# Patient Record
Sex: FEMALE | Race: WHITE | NOT HISPANIC OR LATINO | Employment: OTHER | URBAN - METROPOLITAN AREA
[De-identification: names, ages, dates, MRNs, and addresses within clinical notes are randomized per-mention and may not be internally consistent; named-entity substitution may affect disease eponyms.]

---

## 2017-05-04 ENCOUNTER — ALLSCRIPTS OFFICE VISIT (OUTPATIENT)
Dept: OTHER | Facility: OTHER | Age: 82
End: 2017-05-04

## 2017-05-04 DIAGNOSIS — I10 ESSENTIAL (PRIMARY) HYPERTENSION: ICD-10-CM

## 2017-05-23 ENCOUNTER — LAB REQUISITION (OUTPATIENT)
Dept: LAB | Facility: HOSPITAL | Age: 82
End: 2017-05-23
Payer: MEDICARE

## 2017-05-23 ENCOUNTER — TRANSCRIBE ORDERS (OUTPATIENT)
Dept: ADMINISTRATIVE | Facility: HOSPITAL | Age: 82
End: 2017-05-23

## 2017-05-23 DIAGNOSIS — I10 ESSENTIAL (PRIMARY) HYPERTENSION: ICD-10-CM

## 2017-05-23 LAB
ALBUMIN SERPL BCP-MCNC: 3 G/DL (ref 3.5–5)
ALP SERPL-CCNC: 69 U/L (ref 46–116)
ALT SERPL W P-5'-P-CCNC: 16 U/L (ref 12–78)
ANION GAP SERPL CALCULATED.3IONS-SCNC: 0 MMOL/L (ref 4–13)
AST SERPL W P-5'-P-CCNC: 17 U/L (ref 5–45)
BASOPHILS # BLD AUTO: 0.03 THOUSANDS/ΜL (ref 0–0.1)
BASOPHILS NFR BLD AUTO: 1 % (ref 0–1)
BILIRUB SERPL-MCNC: 0.4 MG/DL (ref 0.2–1)
BUN SERPL-MCNC: 12 MG/DL (ref 5–25)
CALCIUM SERPL-MCNC: 9.8 MG/DL (ref 8.3–10.1)
CHLORIDE SERPL-SCNC: 100 MMOL/L (ref 100–108)
CO2 SERPL-SCNC: 36 MMOL/L (ref 21–32)
CREAT SERPL-MCNC: 0.72 MG/DL (ref 0.6–1.3)
EOSINOPHIL # BLD AUTO: 0.18 THOUSAND/ΜL (ref 0–0.61)
EOSINOPHIL NFR BLD AUTO: 3 % (ref 0–6)
ERYTHROCYTE [DISTWIDTH] IN BLOOD BY AUTOMATED COUNT: 11.9 % (ref 11.6–15.1)
GFR SERPL CREATININE-BSD FRML MDRD: >60 ML/MIN/1.73SQ M
GLUCOSE P FAST SERPL-MCNC: 99 MG/DL (ref 65–99)
HCT VFR BLD AUTO: 43.2 % (ref 34.8–46.1)
HGB BLD-MCNC: 14.9 G/DL (ref 11.5–15.4)
LYMPHOCYTES # BLD AUTO: 1.58 THOUSANDS/ΜL (ref 0.6–4.47)
LYMPHOCYTES NFR BLD AUTO: 29 % (ref 14–44)
MCH RBC QN AUTO: 33 PG (ref 26.8–34.3)
MCHC RBC AUTO-ENTMCNC: 34.5 G/DL (ref 31.4–37.4)
MCV RBC AUTO: 96 FL (ref 82–98)
MONOCYTES # BLD AUTO: 0.45 THOUSAND/ΜL (ref 0.17–1.22)
MONOCYTES NFR BLD AUTO: 8 % (ref 4–12)
NEUTROPHILS # BLD AUTO: 3.15 THOUSANDS/ΜL (ref 1.85–7.62)
NEUTS SEG NFR BLD AUTO: 59 % (ref 43–75)
PLATELET # BLD AUTO: 312 THOUSANDS/UL (ref 149–390)
PMV BLD AUTO: 9.7 FL (ref 8.9–12.7)
POTASSIUM SERPL-SCNC: 4.5 MMOL/L (ref 3.5–5.3)
PROT SERPL-MCNC: 6.9 G/DL (ref 6.4–8.2)
RBC # BLD AUTO: 4.52 MILLION/UL (ref 3.81–5.12)
SODIUM SERPL-SCNC: 136 MMOL/L (ref 136–145)
WBC # BLD AUTO: 5.39 THOUSAND/UL (ref 4.31–10.16)

## 2017-05-23 PROCEDURE — 80053 COMPREHEN METABOLIC PANEL: CPT | Performed by: INTERNAL MEDICINE

## 2017-05-23 PROCEDURE — 85025 COMPLETE CBC W/AUTO DIFF WBC: CPT | Performed by: INTERNAL MEDICINE

## 2017-05-24 ENCOUNTER — GENERIC CONVERSION - ENCOUNTER (OUTPATIENT)
Dept: OTHER | Facility: OTHER | Age: 82
End: 2017-05-24

## 2018-01-12 NOTE — MISCELLANEOUS
Message  having increased difficulty with ambulating more than 1/2 block due to leg weakness and fatigue   As per my conversation with her daughter   I feel a transport wheelchair is needed to safely transport for appointments and needed shopping , banking needs etc   will complete form for [employer]   Electronically signed by : Yaima Tellez DO; Oct 10 2016  4:56PM EST                       (Author)

## 2018-01-13 NOTE — RESULT NOTES
Message   call [facility] ok     Verified Results  (1) CBC/ PLT (NO DIFF) 28Apr2016 05:10PM [facility] Order Number: [id number]    TW Order Number: [id number]     Test Name Result Flag Reference   HEMATOCRIT 45 0 %  34 8-46 1   HEMOGLOBIN 15 1 g/dL  11 5-15 4   MCHC 33 6 g/dL  31 4-37 4   MCH 32 1 pg  26 8-34 3   MCV 96 fL  82-98   PLATELET COUNT 957 Thousands/uL  149-390   RBC COUNT 4 70 Million/uL  3 81-5 12   RDW 11 7 %  11 6-15 1   WBC COUNT 6 36 Thousand/uL  4 31-10 16   MPV 9 4 fL  8 9-12 7     (1) COMPREHENSIVE METABOLIC PANEL [id number] 15:33TL Rajwinder [employer] Order Number: [id number]     Order Number: [id number] Order Number: [id number]  National Kidney Disease Education Program recommendations are as follows:  GFR calculation is accurate only with a steady state creatinine  Chronic Kidney disease less than 60 ml/min/1 73 sq  meters  Kidney failure less than 15 ml/min/1 73 sq  meters  Test Name Result Flag Reference   GLUCOSE,RANDM 127 mg/dL     If the patient is fasting, the ADA then defines impaired fasting glucose as > 100 mg/dL and diabetes as > or equal to 123 mg/dL     SODIUM 134 mmol/L L 136-145   POTASSIUM 4 3 mmol/L  3 5-5 3   CHLORIDE 97 mmol/L L 100-108   CARBON DIOXIDE 32 mmol/L  21-32   ANION GAP (CALC) 5 mmol/L  4-13   BLOOD UREA NITROGEN 14 mg/dL  5-25   CREATININE 0 90 mg/dL  0 60-1 30   Standardized to IDMS reference method   CALCIUM 10 0 mg/dL  8 3-10 1   BILI, TOTAL 0 50 mg/dL  0 20-1 00   ALK PHOSPHATAS 73 U/L     ALT (SGPT) 16 U/L  12-78   AST(SGOT) 16 U/L  5-45   ALBUMIN 3 3 g/dL L 3 5-5 0   TOTAL PROTEIN 7 0 g/dL  6 4-8 2   eGFR Non-African American 58 3 ml/min/1 73sq m       (1) TSH 28Apr2016 05:10PM [employer] Order Number: [id number]     Order Number: [id number] Order Number: [id number]        The recommended reference ranges for TSH during pregnancy are as follows:  First trimester 0 1 to 2 5 uIU/mL  Second trimester  0 2 to 3 0 uIU/mL  Third trimester 0 3 to 3 0 uIU/m     Test Name Result Flag Reference   TSH 2 367 uIU/mL  0 358-3 740

## 2018-01-14 VITALS — DIASTOLIC BLOOD PRESSURE: 78 MMHG | SYSTOLIC BLOOD PRESSURE: 130 MMHG

## 2018-01-16 NOTE — RESULT NOTES
Verified Results  (1) CBC/PLT/DIFF [id number] 06:00PM Herbert Bombard     Test Name Result Flag Reference   WBC COUNT 5 39 Thousand/uL  4 31-10 16   RBC COUNT 4 52 Million/uL  3 81-5 12   HEMOGLOBIN 14 9 g/dL  11 5-15 4   HEMATOCRIT 43 2 %  34 8-46  1   MCV 96 fL  82-98   MCH 33 0 pg  26 8-34 3   MCHC 34 5 g/dL  31 4-37 4   RDW 11 9 %  11 6-15 1   MPV 9 7 fL  8 9-12 7   PLATELET COUNT 965 Thousands/uL  149-390   NEUTROPHILS RELATIVE PERCENT 59 %  43-75   LYMPHOCYTES RELATIVE PERCENT 29 %  14-44   MONOCYTES RELATIVE PERCENT 8 %  4-12   EOSINOPHILS RELATIVE PERCENT 3 %  0-6   BASOPHILS RELATIVE PERCENT 1 %  0-1   NEUTROPHILS ABSOLUTE COUNT 3 15 Thousands/? ??L  1 85-7 62   LYMPHOCYTES ABSOLUTE COUNT 1 58 Thousands/? ??L  0 60-4 47   MONOCYTES ABSOLUTE COUNT 0 45 Thousand/? ??L  0 17-1 22   EOSINOPHILS ABSOLUTE COUNT 0 18 Thousand/? ??L  0 00-0 61   BASOPHILS ABSOLUTE COUNT 0 03 Thousands/? ??L  0 00-0 10   This bloodwork is non-fasting  Please drink two glasses of water morning of  bloodwork  (1) COMPREHENSIVE METABOLIC PANEL [id number] 27:41NR Herbert Bombard     Test Name Result Flag Reference   SODIUM 136 mmol/L  136-145   POTASSIUM 4 5 mmol/L  3 5-5 3   CHLORIDE 100 mmol/L  100-108   CARBON DIOXIDE 36 mmol/L H 21-32   ANION GAP (CALC) 0 mmol/L L 4-13   BLOOD UREA NITROGEN 12 mg/dL  5-25   CREATININE 0 72 mg/dL  0 60-1 30   Standardized to IDMS reference method   CALCIUM 9 8 mg/dL  8 3-10 1   BILI, TOTAL 0 40 mg/dL  0 20-1 00   ALK PHOSPHATAS 69 U/L     ALT (SGPT) 16 U/L  12-78   AST(SGOT) 17 U/L  5-45   ALBUMIN 3 0 g/dL L 3 5-5 0   TOTAL PROTEIN 6 9 g/dL  6 4-8 2   eGFR Non-African American      >60 0 ml/min/1 73sq m   [facility] Disease Education Program recommendations are as follows:  GFR calculation is accurate only with a steady state creatinine  Chronic Kidney disease less than 60 ml/min/1 73 sq  meters  Kidney failure less than 15 ml/min/1 73 sq  meters     GLUCOSE FASTING 99 mg/dL  65-99

## 2018-02-18 DIAGNOSIS — K21.9 GASTROESOPHAGEAL REFLUX DISEASE WITHOUT ESOPHAGITIS: Primary | ICD-10-CM

## 2018-02-18 RX ORDER — OMEPRAZOLE 20 MG/1
CAPSULE, DELAYED RELEASE ORAL
Qty: 90 CAPSULE | Refills: 3 | Status: SHIPPED | OUTPATIENT
Start: 2018-02-18 | End: 2019-02-13 | Stop reason: SDUPTHER

## 2018-03-12 RX ORDER — PAROXETINE 30 MG/1
TABLET, FILM COATED ORAL
Qty: 90 TABLET | Refills: 2 | OUTPATIENT
Start: 2018-03-12

## 2018-04-23 RX ORDER — PAROXETINE 30 MG/1
TABLET, FILM COATED ORAL DAILY
COMMUNITY
Start: 2015-01-21 | End: 2018-05-01 | Stop reason: SDUPTHER

## 2018-04-23 RX ORDER — ALBUTEROL SULFATE 90 UG/1
2 AEROSOL, METERED RESPIRATORY (INHALATION)
COMMUNITY
Start: 2015-09-08 | End: 2019-02-13 | Stop reason: SDUPTHER

## 2018-04-23 RX ORDER — METOPROLOL SUCCINATE 50 MG
TABLET, EXTENDED RELEASE 24 HR ORAL
COMMUNITY
Start: 2018-03-02 | End: 2018-11-27 | Stop reason: SDUPTHER

## 2018-04-23 RX ORDER — NYSTATIN 100000 U/G
CREAM TOPICAL 2 TIMES DAILY
COMMUNITY
Start: 2017-05-19

## 2018-04-23 RX ORDER — BRINZOLAMIDE 10 MG/ML
SUSPENSION/ DROPS OPHTHALMIC
COMMUNITY

## 2018-04-23 RX ORDER — LOSARTAN POTASSIUM 100 MG/1
TABLET ORAL
COMMUNITY
Start: 2018-02-18 | End: 2018-11-15 | Stop reason: SDUPTHER

## 2018-05-01 ENCOUNTER — TELEPHONE (OUTPATIENT)
Dept: FAMILY MEDICINE CLINIC | Facility: HOSPITAL | Age: 83
End: 2018-05-01

## 2018-05-01 DIAGNOSIS — F34.1 DYSTHYMIA: Primary | ICD-10-CM

## 2018-05-01 RX ORDER — PAROXETINE 30 MG/1
30 TABLET, FILM COATED ORAL EVERY MORNING
Qty: 90 TABLET | Refills: 3 | Status: SHIPPED | OUTPATIENT
Start: 2018-05-01 | End: 2018-10-11 | Stop reason: SDUPTHER

## 2018-05-03 ENCOUNTER — TRANSCRIBE ORDERS (OUTPATIENT)
Dept: ADMINISTRATIVE | Facility: HOSPITAL | Age: 83
End: 2018-05-03

## 2018-05-03 ENCOUNTER — OFFICE VISIT (OUTPATIENT)
Dept: FAMILY MEDICINE CLINIC | Facility: HOSPITAL | Age: 83
End: 2018-05-03
Payer: MEDICARE

## 2018-05-03 ENCOUNTER — APPOINTMENT (OUTPATIENT)
Dept: LAB | Facility: HOSPITAL | Age: 83
End: 2018-05-03
Payer: MEDICARE

## 2018-05-03 VITALS
HEIGHT: 64 IN | WEIGHT: 115 LBS | SYSTOLIC BLOOD PRESSURE: 130 MMHG | BODY MASS INDEX: 19.63 KG/M2 | DIASTOLIC BLOOD PRESSURE: 70 MMHG

## 2018-05-03 DIAGNOSIS — J45.20 MILD INTERMITTENT ASTHMA WITHOUT COMPLICATION: ICD-10-CM

## 2018-05-03 DIAGNOSIS — K21.9 GASTROESOPHAGEAL REFLUX DISEASE WITHOUT ESOPHAGITIS: ICD-10-CM

## 2018-05-03 DIAGNOSIS — I10 ESSENTIAL HYPERTENSION: ICD-10-CM

## 2018-05-03 DIAGNOSIS — I10 ESSENTIAL HYPERTENSION: Primary | ICD-10-CM

## 2018-05-03 DIAGNOSIS — F32.A MINOR DEPRESSION: ICD-10-CM

## 2018-05-03 LAB
ALBUMIN SERPL BCP-MCNC: 3.2 G/DL (ref 3.5–5)
ALP SERPL-CCNC: 66 U/L (ref 46–116)
ALT SERPL W P-5'-P-CCNC: 16 U/L (ref 12–78)
ANION GAP SERPL CALCULATED.3IONS-SCNC: 4 MMOL/L (ref 4–13)
AST SERPL W P-5'-P-CCNC: 14 U/L (ref 5–45)
BILIRUB SERPL-MCNC: 0.6 MG/DL (ref 0.2–1)
BUN SERPL-MCNC: 10 MG/DL (ref 5–25)
CALCIUM SERPL-MCNC: 9.7 MG/DL (ref 8.3–10.1)
CHLORIDE SERPL-SCNC: 99 MMOL/L (ref 100–108)
CO2 SERPL-SCNC: 33 MMOL/L (ref 21–32)
CREAT SERPL-MCNC: 0.62 MG/DL (ref 0.6–1.3)
ERYTHROCYTE [DISTWIDTH] IN BLOOD BY AUTOMATED COUNT: 11.9 % (ref 11.6–15.1)
GFR SERPL CREATININE-BSD FRML MDRD: 76 ML/MIN/1.73SQ M
GLUCOSE SERPL-MCNC: 107 MG/DL (ref 65–140)
HCT VFR BLD AUTO: 44.3 % (ref 34.8–46.1)
HGB BLD-MCNC: 14.6 G/DL (ref 11.5–15.4)
MCH RBC QN AUTO: 32.3 PG (ref 26.8–34.3)
MCHC RBC AUTO-ENTMCNC: 33 G/DL (ref 31.4–37.4)
MCV RBC AUTO: 98 FL (ref 82–98)
PLATELET # BLD AUTO: 307 THOUSANDS/UL (ref 149–390)
PMV BLD AUTO: 9.1 FL (ref 8.9–12.7)
POTASSIUM SERPL-SCNC: 4.3 MMOL/L (ref 3.5–5.3)
PROT SERPL-MCNC: 6.7 G/DL (ref 6.4–8.2)
RBC # BLD AUTO: 4.52 MILLION/UL (ref 3.81–5.12)
SODIUM SERPL-SCNC: 136 MMOL/L (ref 136–145)
WBC # BLD AUTO: 6.48 THOUSAND/UL (ref 4.31–10.16)

## 2018-05-03 PROCEDURE — 36415 COLL VENOUS BLD VENIPUNCTURE: CPT | Performed by: INTERNAL MEDICINE

## 2018-05-03 PROCEDURE — 80053 COMPREHEN METABOLIC PANEL: CPT | Performed by: INTERNAL MEDICINE

## 2018-05-03 PROCEDURE — 99213 OFFICE O/P EST LOW 20 MIN: CPT | Performed by: INTERNAL MEDICINE

## 2018-05-03 PROCEDURE — 85027 COMPLETE CBC AUTOMATED: CPT | Performed by: INTERNAL MEDICINE

## 2018-05-03 NOTE — PROGRESS NOTES
Assessment/Plan:       Diagnoses and all orders for this visit:    Essential hypertension    Mild intermittent asthma without complication    Minor depression    Gastroesophageal reflux disease without esophagitis          All of the above diagnoses have been assessed  Additional COMMENTS/PLAN: Doing well for her age      Subjective:      Patient ID: Jelani Palomino is a 80 y o  female  HPI    The following portions of the patient's history were revised and updated as appropriate: Problem list, allergies, med list, FH, SH, Past medical and surgical histories  Current Outpatient Prescriptions   Medication Sig Dispense Refill    albuterol (PROAIR HFA) 90 mcg/act inhaler Inhale 2 puffs      brinzolamide (AZOPT) 1 % ophthalmic suspension Apply to eye      losartan (COZAAR) 100 MG tablet       nystatin (MYCOSTATIN) cream Apply topically 2 (two) times a day      omeprazole (PriLOSEC) 20 mg delayed release capsule TAKE 1 CAPSULE DAILY 90 capsule 3    PARoxetine (PAXIL) 30 mg tablet Take 1 tablet (30 mg total) by mouth every morning 90 tablet 3    TOPROL XL 50 MG 24 hr tablet        No current facility-administered medications for this visit  Review of Systems   Constitutional: Negative  Has lost 19 lbs in the last year  Eyes: Positive for visual disturbance  Respiratory: Negative  Cardiovascular: Negative  Gastrointestinal: Negative  Genitourinary: Negative  Objective:    /80 (BP Location: Left arm, Patient Position: Sitting, Cuff Size: Standard)   Ht 5' 4" (1 626 m)   Wt 52 2 kg (115 lb)   BMI 19 74 kg/m²      Physical Exam   Neck: No thyromegaly present  Cardiovascular: Normal rate and regular rhythm  Pulmonary/Chest: Effort normal and breath sounds normal    Abdominal: Soft  Bowel sounds are normal    Musculoskeletal: She exhibits no edema  Vitals reviewed  No visits with results within 2 Week(s) from this visit     Latest known visit with results is:   Lab Requisition on 05/23/2017   Component Date Value Ref Range Status    WBC 05/23/2017 5 39  4 31 - 10 16 Thousand/uL Final    RBC 05/23/2017 4 52  3 81 - 5 12 Million/uL Final    Hemoglobin 05/23/2017 14 9  11 5 - 15 4 g/dL Final    Hematocrit 05/23/2017 43 2  34 8 - 46 1 % Final    MCV 05/23/2017 96  82 - 98 fL Final    MCH 05/23/2017 33 0  26 8 - 34 3 pg Final    MCHC 05/23/2017 34 5  31 4 - 37 4 g/dL Final    RDW 05/23/2017 11 9  11 6 - 15 1 % Final    MPV 05/23/2017 9 7  8 9 - 12 7 fL Final    Platelets 95/19/1978 312  149 - 390 Thousands/uL Final    Neutrophils Relative 05/23/2017 59  43 - 75 % Final    Lymphocytes Relative 05/23/2017 29  14 - 44 % Final    Monocytes Relative 05/23/2017 8  4 - 12 % Final    Eosinophils Relative 05/23/2017 3  0 - 6 % Final    Basophils Relative 05/23/2017 1  0 - 1 % Final    Neutrophils Absolute 05/23/2017 3 15  1 85 - 7 62 Thousands/µL Final    Lymphocytes Absolute 05/23/2017 1 58  0 60 - 4 47 Thousands/µL Final    Monocytes Absolute 05/23/2017 0 45  0 17 - 1 22 Thousand/µL Final    Eosinophils Absolute 05/23/2017 0 18  0 00 - 0 61 Thousand/µL Final    Basophils Absolute 05/23/2017 0 03  0 00 - 0 10 Thousands/µL Final    Sodium 05/23/2017 136  136 - 145 mmol/L Final    Potassium 05/23/2017 4 5  3 5 - 5 3 mmol/L Final    Chloride 05/23/2017 100  100 - 108 mmol/L Final    CO2 05/23/2017 36* 21 - 32 mmol/L Final    Anion Gap 05/23/2017 0* 4 - 13 mmol/L Final    BUN 05/23/2017 12  5 - 25 mg/dL Final    Creatinine 05/23/2017 0 72  0 60 - 1 30 mg/dL Final    Standardized to IDMS reference method    Glucose, Fasting 05/23/2017 99  65 - 99 mg/dL Final    Calcium 05/23/2017 9 8  8 3 - 10 1 mg/dL Final    AST 05/23/2017 17  5 - 45 U/L Final    ALT 05/23/2017 16  12 - 78 U/L Final    Alkaline Phosphatase 05/23/2017 69  46 - 116 U/L Final    Total Protein 05/23/2017 6 9  6 4 - 8 2 g/dL Final    Albumin 05/23/2017 3 0* 3 5 - 5 0 g/dL Final  Total Bilirubin 05/23/2017 0 40  0 20 - 1 00 mg/dL Final    eGFR 05/23/2017 >60 0  ml/min/1 73sq m Final         Eunice Horner MD

## 2018-06-07 ENCOUNTER — TELEPHONE (OUTPATIENT)
Dept: FAMILY MEDICINE CLINIC | Facility: HOSPITAL | Age: 83
End: 2018-06-07

## 2018-06-14 ENCOUNTER — TELEPHONE (OUTPATIENT)
Dept: FAMILY MEDICINE CLINIC | Facility: HOSPITAL | Age: 83
End: 2018-06-14

## 2018-10-11 DIAGNOSIS — F34.1 DYSTHYMIA: ICD-10-CM

## 2018-10-11 RX ORDER — PAROXETINE 30 MG/1
30 TABLET, FILM COATED ORAL EVERY MORNING
Qty: 90 TABLET | Refills: 3 | Status: SHIPPED | OUTPATIENT
Start: 2018-10-11 | End: 2019-10-07 | Stop reason: SDUPTHER

## 2018-11-15 DIAGNOSIS — I10 ESSENTIAL HYPERTENSION: Primary | ICD-10-CM

## 2018-11-15 RX ORDER — LOSARTAN POTASSIUM 100 MG/1
TABLET ORAL
Qty: 90 TABLET | Refills: 3 | Status: SHIPPED | OUTPATIENT
Start: 2018-11-15 | End: 2019-11-10 | Stop reason: SDUPTHER

## 2018-11-27 DIAGNOSIS — I10 ESSENTIAL HYPERTENSION: Primary | ICD-10-CM

## 2018-11-28 RX ORDER — METOPROLOL SUCCINATE 50 MG/1
TABLET, EXTENDED RELEASE ORAL
Qty: 90 TABLET | Refills: 3 | Status: SHIPPED | OUTPATIENT
Start: 2018-11-28 | End: 2019-12-08 | Stop reason: SDUPTHER

## 2019-01-03 ENCOUNTER — TELEPHONE (OUTPATIENT)
Dept: FAMILY MEDICINE CLINIC | Facility: HOSPITAL | Age: 84
End: 2019-01-03

## 2019-01-03 NOTE — TELEPHONE ENCOUNTER
Spoke with Evelyne Ahmadi, she will call the pharmacy to see if the medication they were given was affected by the recall

## 2019-02-13 DIAGNOSIS — J45.20 MILD INTERMITTENT ASTHMA WITHOUT COMPLICATION: Primary | ICD-10-CM

## 2019-02-13 DIAGNOSIS — K21.9 GASTROESOPHAGEAL REFLUX DISEASE WITHOUT ESOPHAGITIS: ICD-10-CM

## 2019-02-13 RX ORDER — OMEPRAZOLE 20 MG/1
CAPSULE, DELAYED RELEASE ORAL
Qty: 90 CAPSULE | Refills: 3 | Status: SHIPPED | OUTPATIENT
Start: 2019-02-13 | End: 2022-06-11

## 2019-02-13 RX ORDER — ALBUTEROL SULFATE 90 UG/1
2 AEROSOL, METERED RESPIRATORY (INHALATION) EVERY 4 HOURS PRN
Qty: 1 INHALER | Refills: 3 | Status: SHIPPED | OUTPATIENT
Start: 2019-02-13

## 2019-09-24 ENCOUNTER — TELEPHONE (OUTPATIENT)
Dept: FAMILY MEDICINE CLINIC | Facility: HOSPITAL | Age: 84
End: 2019-09-24

## 2019-09-24 NOTE — TELEPHONE ENCOUNTER
On recall- dtr spoke [facility] and Losartan recall did not include her particular Lot  She wants your opinion on whether or not to switch, she is fine either way, Also: asking if you do house calls  PT has many bad days and it may be difficult to get her here for OV

## 2019-10-07 DIAGNOSIS — F34.1 DYSTHYMIA: ICD-10-CM

## 2019-10-07 RX ORDER — PAROXETINE 30 MG/1
TABLET, FILM COATED ORAL
Qty: 90 TABLET | Refills: 4 | Status: SHIPPED | OUTPATIENT
Start: 2019-10-07 | End: 2022-06-11

## 2019-11-10 DIAGNOSIS — I10 ESSENTIAL HYPERTENSION: ICD-10-CM

## 2019-11-10 RX ORDER — LOSARTAN POTASSIUM 100 MG/1
TABLET ORAL
Qty: 90 TABLET | Refills: 4 | Status: SHIPPED | OUTPATIENT
Start: 2019-11-10 | End: 2022-06-11

## 2019-11-12 ENCOUNTER — HOSPITAL ENCOUNTER (INPATIENT)
Facility: HOSPITAL | Age: 84
LOS: 2 days | Discharge: HOME HEALTH | DRG: 884 | End: 2019-11-15
Attending: EMERGENCY MEDICINE | Admitting: INTERNAL MEDICINE
Payer: MEDICARE

## 2019-11-12 ENCOUNTER — APPOINTMENT (EMERGENCY)
Dept: RADIOLOGY | Facility: HOSPITAL | Age: 84
DRG: 884 | End: 2019-11-12
Payer: MEDICARE

## 2019-11-12 DIAGNOSIS — R53.1 WEAKNESS: Primary | ICD-10-CM

## 2019-11-12 DIAGNOSIS — R09.02 HYPOXIA: ICD-10-CM

## 2019-11-12 DIAGNOSIS — R26.81 UNSTEADY GAIT: ICD-10-CM

## 2019-11-12 LAB
ALBUMIN SERPL BCP-MCNC: 2.6 G/DL (ref 3.5–5)
ALP SERPL-CCNC: 60 U/L (ref 46–116)
ALT SERPL W P-5'-P-CCNC: 17 U/L (ref 12–78)
ANION GAP SERPL CALCULATED.3IONS-SCNC: 1 MMOL/L (ref 4–13)
APTT PPP: 39 SECONDS (ref 23–37)
AST SERPL W P-5'-P-CCNC: 21 U/L (ref 5–45)
BACTERIA UR QL AUTO: ABNORMAL /HPF
BASOPHILS # BLD AUTO: 0.03 THOUSANDS/ΜL (ref 0–0.1)
BASOPHILS NFR BLD AUTO: 0 % (ref 0–1)
BILIRUB SERPL-MCNC: 0.3 MG/DL (ref 0.2–1)
BILIRUB UR QL STRIP: NEGATIVE
BUN SERPL-MCNC: 11 MG/DL (ref 5–25)
CALCIUM SERPL-MCNC: 8.7 MG/DL (ref 8.3–10.1)
CHLORIDE SERPL-SCNC: 101 MMOL/L (ref 100–108)
CLARITY UR: CLEAR
CO2 SERPL-SCNC: 31 MMOL/L (ref 21–32)
COLOR UR: ABNORMAL
CREAT SERPL-MCNC: 0.61 MG/DL (ref 0.6–1.3)
EOSINOPHIL # BLD AUTO: 0.08 THOUSAND/ΜL (ref 0–0.61)
EOSINOPHIL NFR BLD AUTO: 1 % (ref 0–6)
ERYTHROCYTE [DISTWIDTH] IN BLOOD BY AUTOMATED COUNT: 11.4 % (ref 11.6–15.1)
GFR SERPL CREATININE-BSD FRML MDRD: 76 ML/MIN/1.73SQ M
GLUCOSE SERPL-MCNC: 75 MG/DL (ref 65–140)
GLUCOSE UR STRIP-MCNC: NEGATIVE MG/DL
HCT VFR BLD AUTO: 44.9 % (ref 34.8–46.1)
HGB BLD-MCNC: 14.4 G/DL (ref 11.5–15.4)
HGB UR QL STRIP.AUTO: ABNORMAL
IMM GRANULOCYTES # BLD AUTO: 0.02 THOUSAND/UL (ref 0–0.2)
IMM GRANULOCYTES NFR BLD AUTO: 0 % (ref 0–2)
INR PPP: 1 (ref 0.84–1.19)
KETONES UR STRIP-MCNC: NEGATIVE MG/DL
LEUKOCYTE ESTERASE UR QL STRIP: NEGATIVE
LYMPHOCYTES # BLD AUTO: 1.17 THOUSANDS/ΜL (ref 0.6–4.47)
LYMPHOCYTES NFR BLD AUTO: 16 % (ref 14–44)
MCH RBC QN AUTO: 32 PG (ref 26.8–34.3)
MCHC RBC AUTO-ENTMCNC: 32.1 G/DL (ref 31.4–37.4)
MCV RBC AUTO: 100 FL (ref 82–98)
MONOCYTES # BLD AUTO: 0.71 THOUSAND/ΜL (ref 0.17–1.22)
MONOCYTES NFR BLD AUTO: 10 % (ref 4–12)
NEUTROPHILS # BLD AUTO: 5.3 THOUSANDS/ΜL (ref 1.85–7.62)
NEUTS SEG NFR BLD AUTO: 73 % (ref 43–75)
NITRITE UR QL STRIP: NEGATIVE
NON-SQ EPI CELLS URNS QL MICRO: ABNORMAL /HPF
NRBC BLD AUTO-RTO: 0 /100 WBCS
PH UR STRIP.AUTO: 6.5 [PH]
PLATELET # BLD AUTO: 309 THOUSANDS/UL (ref 149–390)
PMV BLD AUTO: 9.4 FL (ref 8.9–12.7)
POTASSIUM SERPL-SCNC: 3.9 MMOL/L (ref 3.5–5.3)
PROT SERPL-MCNC: 6 G/DL (ref 6.4–8.2)
PROT UR STRIP-MCNC: NEGATIVE MG/DL
PROTHROMBIN TIME: 12.9 SECONDS (ref 11.6–14.5)
RBC # BLD AUTO: 4.5 MILLION/UL (ref 3.81–5.12)
RBC #/AREA URNS AUTO: ABNORMAL /HPF
SODIUM SERPL-SCNC: 133 MMOL/L (ref 136–145)
SP GR UR STRIP.AUTO: 1.01 (ref 1–1.03)
TROPONIN I SERPL-MCNC: <0.02 NG/ML
UROBILINOGEN UR QL STRIP.AUTO: 0.2 E.U./DL
WBC # BLD AUTO: 7.31 THOUSAND/UL (ref 4.31–10.16)
WBC #/AREA URNS AUTO: ABNORMAL /HPF

## 2019-11-12 PROCEDURE — 71046 X-RAY EXAM CHEST 2 VIEWS: CPT

## 2019-11-12 PROCEDURE — 84484 ASSAY OF TROPONIN QUANT: CPT | Performed by: EMERGENCY MEDICINE

## 2019-11-12 PROCEDURE — 80053 COMPREHEN METABOLIC PANEL: CPT | Performed by: EMERGENCY MEDICINE

## 2019-11-12 PROCEDURE — 85610 PROTHROMBIN TIME: CPT | Performed by: EMERGENCY MEDICINE

## 2019-11-12 PROCEDURE — 81001 URINALYSIS AUTO W/SCOPE: CPT | Performed by: EMERGENCY MEDICINE

## 2019-11-12 PROCEDURE — 96361 HYDRATE IV INFUSION ADD-ON: CPT

## 2019-11-12 PROCEDURE — 36415 COLL VENOUS BLD VENIPUNCTURE: CPT | Performed by: EMERGENCY MEDICINE

## 2019-11-12 PROCEDURE — 85025 COMPLETE CBC W/AUTO DIFF WBC: CPT | Performed by: EMERGENCY MEDICINE

## 2019-11-12 PROCEDURE — 85730 THROMBOPLASTIN TIME PARTIAL: CPT | Performed by: EMERGENCY MEDICINE

## 2019-11-12 PROCEDURE — 99285 EMERGENCY DEPT VISIT HI MDM: CPT

## 2019-11-12 PROCEDURE — 93005 ELECTROCARDIOGRAM TRACING: CPT

## 2019-11-12 PROCEDURE — 96374 THER/PROPH/DIAG INJ IV PUSH: CPT

## 2019-11-12 PROCEDURE — 99285 EMERGENCY DEPT VISIT HI MDM: CPT | Performed by: EMERGENCY MEDICINE

## 2019-11-12 RX ORDER — HYDRALAZINE HYDROCHLORIDE 20 MG/ML
10 INJECTION INTRAMUSCULAR; INTRAVENOUS ONCE
Status: COMPLETED | OUTPATIENT
Start: 2019-11-13 | End: 2019-11-12

## 2019-11-12 RX ORDER — HYDRALAZINE HYDROCHLORIDE 25 MG/1
25 TABLET, FILM COATED ORAL ONCE
Status: COMPLETED | OUTPATIENT
Start: 2019-11-12 | End: 2019-11-12

## 2019-11-12 RX ORDER — SODIUM CHLORIDE FOR INHALATION 0.9 %
VIAL, NEBULIZER (ML) INHALATION
Status: DISCONTINUED
Start: 2019-11-12 | End: 2019-11-12 | Stop reason: WASHOUT

## 2019-11-12 RX ORDER — ALBUTEROL SULFATE 2.5 MG/3ML
5 SOLUTION RESPIRATORY (INHALATION) ONCE
Status: DISCONTINUED | OUTPATIENT
Start: 2019-11-12 | End: 2019-11-15 | Stop reason: HOSPADM

## 2019-11-12 RX ORDER — HYDRALAZINE HYDROCHLORIDE 20 MG/ML
5 INJECTION INTRAMUSCULAR; INTRAVENOUS ONCE
Status: COMPLETED | OUTPATIENT
Start: 2019-11-12 | End: 2019-11-12

## 2019-11-12 RX ADMIN — HYDRALAZINE HYDROCHLORIDE 25 MG: 25 TABLET ORAL at 23:42

## 2019-11-12 RX ADMIN — SODIUM CHLORIDE 1000 ML: 0.9 INJECTION, SOLUTION INTRAVENOUS at 19:54

## 2019-11-12 RX ADMIN — HYDRALAZINE HYDROCHLORIDE 5 MG: 20 INJECTION INTRAMUSCULAR; INTRAVENOUS at 22:01

## 2019-11-12 RX ADMIN — HYDRALAZINE HYDROCHLORIDE 10 MG: 20 INJECTION INTRAMUSCULAR; INTRAVENOUS at 23:58

## 2019-11-13 ENCOUNTER — TELEPHONE (OUTPATIENT)
Dept: FAMILY MEDICINE CLINIC | Facility: HOSPITAL | Age: 84
End: 2019-11-13

## 2019-11-13 PROBLEM — R26.81 UNSTEADY GAIT: Status: ACTIVE | Noted: 2019-11-13

## 2019-11-13 PROBLEM — R53.1 WEAKNESS: Status: ACTIVE | Noted: 2019-11-13

## 2019-11-13 LAB
ANION GAP SERPL CALCULATED.3IONS-SCNC: 3 MMOL/L (ref 4–13)
ATRIAL RATE: 61 BPM
BASOPHILS # BLD AUTO: 0.04 THOUSANDS/ΜL (ref 0–0.1)
BASOPHILS NFR BLD AUTO: 0 % (ref 0–1)
BUN SERPL-MCNC: 15 MG/DL (ref 5–25)
CALCIUM SERPL-MCNC: 9.8 MG/DL (ref 8.3–10.1)
CHLORIDE SERPL-SCNC: 101 MMOL/L (ref 100–108)
CO2 SERPL-SCNC: 29 MMOL/L (ref 21–32)
CREAT SERPL-MCNC: 0.7 MG/DL (ref 0.6–1.3)
EOSINOPHIL # BLD AUTO: 0.03 THOUSAND/ΜL (ref 0–0.61)
EOSINOPHIL NFR BLD AUTO: 0 % (ref 0–6)
ERYTHROCYTE [DISTWIDTH] IN BLOOD BY AUTOMATED COUNT: 11.2 % (ref 11.6–15.1)
GFR SERPL CREATININE-BSD FRML MDRD: 72 ML/MIN/1.73SQ M
GLUCOSE P FAST SERPL-MCNC: 99 MG/DL (ref 65–99)
GLUCOSE SERPL-MCNC: 99 MG/DL (ref 65–140)
HCT VFR BLD AUTO: 42.8 % (ref 34.8–46.1)
HGB BLD-MCNC: 13.8 G/DL (ref 11.5–15.4)
IMM GRANULOCYTES # BLD AUTO: 0.03 THOUSAND/UL (ref 0–0.2)
IMM GRANULOCYTES NFR BLD AUTO: 0 % (ref 0–2)
LYMPHOCYTES # BLD AUTO: 1.87 THOUSANDS/ΜL (ref 0.6–4.47)
LYMPHOCYTES NFR BLD AUTO: 17 % (ref 14–44)
MAGNESIUM SERPL-MCNC: 1.8 MG/DL (ref 1.6–2.6)
MCH RBC QN AUTO: 31.8 PG (ref 26.8–34.3)
MCHC RBC AUTO-ENTMCNC: 32.2 G/DL (ref 31.4–37.4)
MCV RBC AUTO: 99 FL (ref 82–98)
MONOCYTES # BLD AUTO: 0.91 THOUSAND/ΜL (ref 0.17–1.22)
MONOCYTES NFR BLD AUTO: 8 % (ref 4–12)
NEUTROPHILS # BLD AUTO: 8.43 THOUSANDS/ΜL (ref 1.85–7.62)
NEUTS SEG NFR BLD AUTO: 75 % (ref 43–75)
NRBC BLD AUTO-RTO: 0 /100 WBCS
P AXIS: 74 DEGREES
PHOSPHATE SERPL-MCNC: 2.3 MG/DL (ref 2.3–4.1)
PLATELET # BLD AUTO: 305 THOUSANDS/UL (ref 149–390)
PMV BLD AUTO: 9.6 FL (ref 8.9–12.7)
POTASSIUM SERPL-SCNC: 4.3 MMOL/L (ref 3.5–5.3)
PR INTERVAL: 224 MS
QRS AXIS: 18 DEGREES
QRSD INTERVAL: 72 MS
QT INTERVAL: 410 MS
QTC INTERVAL: 416 MS
RBC # BLD AUTO: 4.34 MILLION/UL (ref 3.81–5.12)
SODIUM SERPL-SCNC: 133 MMOL/L (ref 136–145)
T WAVE AXIS: 80 DEGREES
VENTRICULAR RATE: 62 BPM
WBC # BLD AUTO: 11.31 THOUSAND/UL (ref 4.31–10.16)

## 2019-11-13 PROCEDURE — 84100 ASSAY OF PHOSPHORUS: CPT | Performed by: PHYSICIAN ASSISTANT

## 2019-11-13 PROCEDURE — G8979 MOBILITY GOAL STATUS: HCPCS

## 2019-11-13 PROCEDURE — G0008 ADMIN INFLUENZA VIRUS VAC: HCPCS | Performed by: HOSPITALIST

## 2019-11-13 PROCEDURE — 80048 BASIC METABOLIC PNL TOTAL CA: CPT | Performed by: PHYSICIAN ASSISTANT

## 2019-11-13 PROCEDURE — G8987 SELF CARE CURRENT STATUS: HCPCS

## 2019-11-13 PROCEDURE — G8988 SELF CARE GOAL STATUS: HCPCS

## 2019-11-13 PROCEDURE — 85025 COMPLETE CBC W/AUTO DIFF WBC: CPT | Performed by: PHYSICIAN ASSISTANT

## 2019-11-13 PROCEDURE — 83735 ASSAY OF MAGNESIUM: CPT | Performed by: PHYSICIAN ASSISTANT

## 2019-11-13 PROCEDURE — G8978 MOBILITY CURRENT STATUS: HCPCS

## 2019-11-13 PROCEDURE — 90662 IIV NO PRSV INCREASED AG IM: CPT | Performed by: HOSPITALIST

## 2019-11-13 PROCEDURE — 97163 PT EVAL HIGH COMPLEX 45 MIN: CPT

## 2019-11-13 PROCEDURE — 97167 OT EVAL HIGH COMPLEX 60 MIN: CPT

## 2019-11-13 PROCEDURE — 93010 ELECTROCARDIOGRAM REPORT: CPT | Performed by: INTERNAL MEDICINE

## 2019-11-13 PROCEDURE — 99223 1ST HOSP IP/OBS HIGH 75: CPT | Performed by: INTERNAL MEDICINE

## 2019-11-13 RX ORDER — BRINZOLAMIDE 10 MG/ML
1 SUSPENSION/ DROPS OPHTHALMIC EVERY 8 HOURS SCHEDULED
Status: DISCONTINUED | OUTPATIENT
Start: 2019-11-13 | End: 2019-11-15 | Stop reason: HOSPADM

## 2019-11-13 RX ORDER — HEPARIN SODIUM 5000 [USP'U]/ML
5000 INJECTION, SOLUTION INTRAVENOUS; SUBCUTANEOUS EVERY 8 HOURS SCHEDULED
Status: DISCONTINUED | OUTPATIENT
Start: 2019-11-13 | End: 2019-11-15 | Stop reason: HOSPADM

## 2019-11-13 RX ORDER — HALOPERIDOL 5 MG/ML
2 INJECTION INTRAMUSCULAR ONCE
Status: COMPLETED | OUTPATIENT
Start: 2019-11-13 | End: 2019-11-13

## 2019-11-13 RX ORDER — NYSTATIN 100000 U/G
CREAM TOPICAL 2 TIMES DAILY
Status: DISCONTINUED | OUTPATIENT
Start: 2019-11-13 | End: 2019-11-13

## 2019-11-13 RX ORDER — NYSTATIN 100000 U/G
CREAM TOPICAL 2 TIMES DAILY PRN
Status: DISCONTINUED | OUTPATIENT
Start: 2019-11-13 | End: 2019-11-15 | Stop reason: HOSPADM

## 2019-11-13 RX ORDER — LANOLIN ALCOHOL/MO/W.PET/CERES
3 CREAM (GRAM) TOPICAL
Status: DISCONTINUED | OUTPATIENT
Start: 2019-11-13 | End: 2019-11-15 | Stop reason: HOSPADM

## 2019-11-13 RX ADMIN — BRINZOLAMIDE 1 DROP: 10 SUSPENSION/ DROPS OPHTHALMIC at 14:14

## 2019-11-13 RX ADMIN — HALOPERIDOL LACTATE 2 MG: 5 INJECTION INTRAMUSCULAR at 00:34

## 2019-11-13 RX ADMIN — INFLUENZA A VIRUS A/MICHIGAN/45/2015 X-275 (H1N1) ANTIGEN (FORMALDEHYDE INACTIVATED), INFLUENZA A VIRUS A/SINGAPORE/INFIMH-16-0019/2016 IVR-186 (H3N2) ANTIGEN (FORMALDEHYDE INACTIVATED), AND INFLUENZA B VIRUS B/MARYLAND/15/2016 BX-69A (A B/COLORADO/6/2017-LIKE VIRUS) ANTIGEN (FORMALDEHYDE INACTIVATED) 0.5 ML: 60; 60; 60 INJECTION, SUSPENSION INTRAMUSCULAR at 14:11

## 2019-11-13 RX ADMIN — HEPARIN SODIUM 5000 UNITS: 5000 INJECTION INTRAVENOUS; SUBCUTANEOUS at 14:16

## 2019-11-13 RX ADMIN — MELATONIN 3 MG: at 21:33

## 2019-11-13 RX ADMIN — HEPARIN SODIUM 5000 UNITS: 5000 INJECTION INTRAVENOUS; SUBCUTANEOUS at 05:02

## 2019-11-13 RX ADMIN — BRINZOLAMIDE 1 DROP: 10 SUSPENSION/ DROPS OPHTHALMIC at 09:30

## 2019-11-13 RX ADMIN — HEPARIN SODIUM 5000 UNITS: 5000 INJECTION INTRAVENOUS; SUBCUTANEOUS at 21:06

## 2019-11-13 NOTE — ASSESSMENT & PLAN NOTE
Patient is a 49-year-old female who lives with his family and has a bedroom upstairs  A last couple weeks patient has increasing weakness and an inability to get up the stairs  Case management evaluation for placement  PT OT for walking assistance

## 2019-11-13 NOTE — PHYSICAL THERAPY NOTE
PHYSICAL THERAPY EVAL       11/13/19 1121   Note Type   Note type Eval only   Pain Assessment   Pain Assessment No/denies pain   Pain Score No Pain   Home Living   Type of Home House   Home Layout Two level;Bed/bath upstairs   Home Equipment Walker   Prior Function   Level of [place] Needs assistance with IADLs; Needs assistance with ADLs and functional mobility   Lives With Daughter   Receives Help From Family   ADL Assistance Needs assistance   IADLs Needs assistance   Falls in the last 6 months 1 to 4   Comments Patient has been able to perform stairs and all mobility at a supervision level without an AD  Dtr is only family available to provide support   Restrictions/Precautions   Weight Bearing Precautions Per Order No   Other Precautions Hard of hearing; Fall Risk;Bed Alarm; Chair Alarm;Cognitive   General   Additional Pertinent History Dementia   Family/Caregiver Present Yes   Cognition   Overall Cognitive Status Impaired   Arousal/Participation Alert   Orientation Level Oriented to person   Following Commands Follows one step commands inconsistently   Comments very [place]   RLE Assessment   RLE Assessment   (Unable to fully assess due to cognition and hearing loss)   LLE Assessment   LLE Assessment   (Unable to fully assess due to cognition and hearing loss)   Bed Mobility   Additional Comments Received OOB on commode   Transfers   Sit to Stand 3  Moderate assistance   Additional items Assist x 2;Armrests; Increased time required;Verbal cues   Stand to Sit 3  Moderate assistance   Additional items Assist x 2;Armrests; Verbal cues   Ambulation/Elevation   Gait pattern Excessively slow; Step to; Foward flexed;Decreased foot clearance   Gait Assistance 3  Moderate assist   Additional items Assist x 2;Verbal cues; Tactile cues   Assistive Device None  (HHA)   Distance 3  (commode to chair)   Stair Management Assistance Not tested Balance   Static Sitting Fair +   Dynamic Sitting Fair   Static Standing Fair -   Dynamic Standing Fair -   Ambulatory Poor +   Endurance Deficit   Endurance Deficit Yes   Activity Tolerance   Activity Tolerance Patient limited by fatigue;Treatment limited secondary to medical complications (Comment)  (cognition)   Medical Staff Made Aware JD Smith   Nurse Made Aware KAHLIL Daily   Assessment   Prognosis Fair   Problem List Decreased strength;Decreased endurance; Impaired balance;Decreased mobility; Decreased cognition; Impaired judgement;Decreased safety awareness; Impaired hearing   Assessment Patient is a 89y/o F who presents after increased weakness over the last couple of weeks  She had a fall before coming to the ED where her knees buckled  PMH significant for A-fib, OA, HTN, dementia  Lives with Dtr in a [facility] with steps to enter and a full flight to the second floor  Patient was at a supervision level for all mobility prior to admission and was not using an AD  Dtr provides supervision for mobility and assists with ADL's/IADL's  Current medical status includes high fall risk, decreased cognition, very [place], agitation, inability to follow direction, poor safety awareness, decreased strength, balance, endurance and mobility  Patient performed transfers and amb with assist of two  Very fatigued and unsteady on her feet  Unwilling to use an AD  Unable to perform full eval including strength and sensation testing as patient was unable to hear and due to poor cognition  Dtr would prefer for patient to return home due to her dementia  Recommending 24 hr supervision/assist, 1st floor setup and home P T  Barriers to Discharge Decreased caregiver support   Goals   Patient Goals none stated   STG Expiration Date 11/27/19   Short Term Goal #1 1  Perform supine<> sit with HOB flat and without the use of bedrails at an independent level 2  Perform sit<>stand transfers with use of UE's at a supervision level 3   Amb 25ft with least restrictive device at a supervision level    PT Treatment Day 0   Plan   Treatment/Interventions Functional transfer training;LE strengthening/ROM; Therapeutic exercise;Elevations; Endurance training;Cognitive reorientation;Equipment eval/education;Patient/family training;Bed mobility;Gait training;Spoke to nursing;OT   PT Frequency Other (Comment)  (3-5x/wk)   Recommendation   Recommendation 24 hour supervision/assist;Home PT   Equipment Recommended   (Patient refuses to use an AD)   Modified [place] Scale   Modified [place] Scale 4   B[place] Index   Feeding 10   Bathing 0   Grooming Score 0   Dressing Score 0   Bladder Score 5   Bowels Score 10   Toilet Use Score 5   Transfers (Bed/Chair) Score 5   Mobility (Level Surface) Score 0   Stairs Score 0   B[place] Index Score 35   Yani Parker, PT            Patient Name: Ilsa  Date: 11/13/2019

## 2019-11-13 NOTE — UTILIZATION REVIEW
Initial Clinical Review    Admission: Date/Time/Statement:    Observation 11/12/19 converted to inpatient admission 11/13/19 for continued care & tx for weakness  Per md:  80 y o  female who medical history of AFib in osteoarthritis patient takes mostly blood pressure medication and Lopressor  Placed on telemetry monitoring  Patient's bedroom is upstairs and family is unable to get her up the steps anymore ambulatory dysfunction severe  Per daughter mild dementia but appears to be more severe  Admitting Physician Santiago Spencer    Level of Care Med Surg    Estimated length of stay More than 2 Midnights    Certification I certify that inpatient services are medically necessary for this patient for a duration of greater than two midnights  See H&P and MD Progress Notes for additional information about the patient's course of treatment            Order History   Inpatient   Date/Time Action Taken User Additional Information   11/13/19 1232 Release Jaqueline Harrell MD (auto-released) From Order: [id number]   11/13/19 1586 Complete Jaqueline Harrell MD      ED Arrival Information     Expected Arrival Acuity Means of Arrival Escorted By Service Admission Type    - 11/12/2019 18:28 Urgent Ambulance [facility] AND [facility] General Medicine Urgent    Arrival Complaint    unsteady gate        Chief Complaint   Patient presents with    Gait Problem     To ED via EMS for evaluation of gait disturbance and family concern that they are unable to assist patient up the stairs to bed  Assessment/Plan:   Brought in by daughter for evaluation of generlized weakness and ambulatory dysfunction  Pt usually able to ambulate upstairs w/ assistance to go to bed and last night when daughter helped her out of the recline, pts knees buckled and went down to the floor  Needed significant assitance getting her up the stairs  Pt did have an episode of urinary incontinence which is unusual for her    Weakness  Assessment & Plan  Patient is a 28-year-old female who lives with his family and has a bedroom upstairs  A last couple weeks patient has increasing weakness and an inability to get up the stairs  Case management evaluation for placement  PT OT for walking assistance  Unsteady gait  Assessment & Plan  Probably age related  Patient also is demented and very hard of hearing    ED Triage Vitals [11/12/19 1834]   Temperature Pulse Respirations Blood Pressure SpO2   (!) 96 5 °F (35 8 °C) 63 14 (!) 194/88 97 %      Temp Source Heart Rate Source Patient Position - Orthostatic VS BP Location FiO2 (%)   Temporal Monitor Sitting Right arm --      Pain Score       No Pain        Wt Readings from Last 1 Encounters:   11/13/19 51 9 kg (114 lb 6 7 oz)     Additional Vital Signs:   Date/Time  Temp  Pulse  Resp  BP  SpO2  O2 Device  Patient Position - Orthostatic VS   11/13/19 0700  97 3 °F (36 3 °C)Abnormal   92  20  158/59  93 %  None (Room air)  Lying   11/13/19 0125  --  101  --  111/59  --  --  Lying   11/12/19 2312  97 9 °F (36 6 °C)  77  25Abnormal   212/98Abnormal   --  None (Room air)  Lying   11/12/19 2137  --  59  24Abnormal   208/95Abnormal   --  None (Room air)  Lying   11/12/19 1834  96 5 °F (35 8 °C)Abnormal   63  14  194/88Abnormal   97 %  None (Room air)  Sitting   Pertinent Labs/Diagnostic Test Results:   Results from last 7 days   Lab Units 11/13/19 0440 11/12/19 1952   WBC Thousand/uL 11 31* 7 31   HEMOGLOBIN g/dL 13 8 14 4   HEMATOCRIT % 42 8 44 9   PLATELETS Thousands/uL 305 309   NEUTROS ABS Thousands/µL 8 43* 5 30     Results from last 7 days   Lab Units 11/13/19 0440 11/12/19 1953   SODIUM mmol/L 133* 133*   POTASSIUM mmol/L 4 3 3 9   CHLORIDE mmol/L 101 101   CO2 mmol/L 29 31   ANION GAP mmol/L 3* 1*   BUN mg/dL 15 11   CREATININE mg/dL 0 70 0 61   EGFR ml/min/1 73sq m 72 76   CALCIUM mg/dL 9 8 8 7   MAGNESIUM mg/dL 1 8  --    PHOSPHORUS mg/dL 2 3  --      Results from last 7 days   Lab Units 11/12/19 1953   AST U/L 21   ALT U/L 17   ALK PHOS U/L 60   TOTAL PROTEIN g/dL 6 0*   ALBUMIN g/dL 2 6*   TOTAL BILIRUBIN mg/dL 0 30     Results from last 7 days   Lab Units 11/13/19  0440 11/12/19 1953   GLUCOSE RANDOM mg/dL 99 75     Results from last 7 days   Lab Units 11/12/19 1952   TROPONIN I ng/mL <0 02     Results from last 7 days   Lab Units 11/12/19 1952   PROTIME seconds 12 9   INR  1 00   PTT seconds 39*     Results from last 7 days   Lab Units 11/12/19  2106   CLARITY UA  Clear   COLOR UA  Straw   SPEC GRAV UA  1 010   PH UA  6 5   GLUCOSE UA mg/dl Negative   KETONES UA mg/dl Negative   BLOOD UA  Trace-Intact*   PROTEIN UA mg/dl Negative   NITRITE UA  Negative   BILIRUBIN UA  Negative   UROBILINOGEN UA E U /dl 0 2   LEUKOCYTES UA  Negative   WBC UA /hpf 0-1*   RBC UA /hpf 0-1*   BACTERIA UA /hpf Occasional   EPITHELIAL CELLS WET PREP /hpf Occasional     11/12  cxr=No acute cardiopulmonary disease   ekg=Sinus rhythm with 1st degree A-V block  ED Treatment:   Medication Administration from 11/12/2019 1828 to 11/12/2019 2259       Date/Time Order Dose Route     11/12/2019 1954 sodium chloride 0 9 % bolus 1,000 mL 1,000 mL Intravenous     11/12/2019 2201 hydrALAZINE (APRESOLINE) injection 5 mg 5 mg Intravenous        Past Medical History:   Diagnosis Date    [facility]     Osteoarthritis      Present on Admission:   Unsteady gait   Weakness  Admitting Diagnosis: Weakness [R53 1]  Unsteady gait [R26 81]  Hypoxia [R09 02]  Age/Sex: 80 y o  female  Admission Orders:  Telemetry  Pt/ot eval & tx  venodynes  Scheduled Medications:  albuterol 5 mg Nebulization Once   brinzolamide 1 drop Both Eyes Q8H [facility]   heparin (porcine) 5,000 Units Subcutaneous Q8H [facility]   influenza vaccine 0 5 mL Intramuscular Once   nystatin  Topical BID     Network Utilization Review Department  [email or web address]  ATTENTION: Please call with any questions or concerns to [phone] and carefully listen to the prompts so that you are directed to the right person  All voicemails are confidential   Kayla Dunham all requests for admission clinical reviews, approved or denied determinations and any other requests to dedicated fax number below belonging to the campus where the patient is receiving treatment    FACILITY NAME UR FAX NUMBER   ADMISSION DENIALS (Administrative/Medical Necessity) [facility] (Maternity/NICU/Pediatrics) [phone]   [facility] [address] [facility] [address] [facility] [phone]   [address] [place] [address] 15[address] [facility] [phone]   Gregorio Perez [address] [facility] [address] [place] [address] [phone]

## 2019-11-13 NOTE — OCCUPATIONAL THERAPY NOTE
Occupational Therapy Evaluation      Sherine GAINES Edi    11/13/2019    Principal Problem:    Weakness  Active Problems:    Unsteady gait      Past Medical History:   Diagnosis Date    A-fib ([address] )     Osteoarthritis        History reviewed  No pertinent surgical history  11/13/19 1120   Note Type   Note type Eval only   Restrictions/Precautions   Weight Bearing Precautions Per Order No   Other Precautions Cognitive; Chair Alarm; Bed Alarm; Fall Risk;Hard of hearing   Pain Assessment   Pain Assessment No/denies pain   Home Living   Type of Home House   Home Layout Two level;Bed/bath upstairs   Bathroom Equipment Commode   Additional Comments Per dtr pt refuses to use RW   Prior Function   Level of [place] Needs assistance with ADLs and functional mobility; Needs assistance with IADLs   Lives With Daughter   Receives Help From Family   ADL Assistance Needs assistance   IADLs Needs assistance   Falls in the last 6 months 1 to 4   Lifestyle   Autonomy Pt resides with daughter and was able to walk/climb stairs with assist (refuses to use RW), and perform self care tasks seated with setup/supervision  Pt is significantly [place] and has a dx of dementia  Pt presented to hosp 2* increasing weakness, incontinence (not baseline)  Spoke with pts daughter; agreed that rehab would be difficult for her 2* dementia as patient is having difficulty adjusting to facility setting in hosp  Pts dtr agreeable to home services and extra caregivers in the home     Psychosocial   Psychosocial (WDL) WDL  ([place] limits conversation)   ADL   Eating Assistance 5  Supervision/Setup   Grooming Assistance 4  Minimal Assistance   UB Bathing Assistance 4  Minimal Assistance   LB Bathing Assistance 2  Maximal Assistance   UB Dressing Assistance 4  Minimal Assistance   LB Dressing Assistance 2  Maximal [address]  3  Moderate Assistance   Bed Mobility   Additional Comments Pt received EOB transferring to commode with nursing   Transfers   Sit to Stand 2  Maximal assistance   Additional items Assist x 2   Stand to Sit 2  Maximal assistance   Additional items Assist x 2   Stand pivot 2  Maximal assistance   Additional items Assist x 2   Functional Mobility   Additional Comments unable at this time   Activity Tolerance   Activity Tolerance Patient limited by fatigue   Medical Staff Made Aware PT Katy   Nurse Made Aware KAHLIL Michelle   RUE Assessment   RUE Assessment   (unable to formally assess 2* cog, appears WFL)   LUE Assessment   LUE Assessment   (unable to formally assess 2* cog, appear WFL)   Cognition   Overall Cognitive Status Impaired   Arousal/Participation Cooperative   Attention Attends with cues to redirect   Orientation Level Oriented to person;Disoriented to place; Disoriented to time;Disoriented to situation   Memory Unable to assess   Following Commands Follows one step commands inconsistently   Comments Pt very [place] and with baseline dementia  Dtr present during evaluation  Pt responded better to dtr's directions than therapists  Assessment   Limitation Decreased ADL status; Decreased Safe judgement during ADL;Decreased cognition;Decreased endurance;Decreased self-care trans   Prognosis Fair   Assessment Pt is a 80 y o  female seen for OT evaluation at [facility], admitted 11/12/2019 w/ Weakness  OT completed extensive review of pt's medical and social history  Comorbidities affecting pt's functional performance at time of assessment include: unsteady gait, HTN, asthma, depression  Personal factors affecting pt at time of IE include:steps to enter environment, behavioral pattern, difficulty performing ADLS, limited insight into deficits, compliance and health management   Prior to admission, pt was living in [facility] with daughter and was assisted with ADLs and ambulation  Per dtr, pt refuses to use a RW but does not attempt to walk without her daughters help   Upon evaluation, pt presents to OT below baseline due to the following performance deficits: weakness, decreased strength, decreased balance, decreased tolerance, impaired memory, impaired sequencing, impaired problem solving, impulsivity and decreased safety awareness  Spoke with dtr at length during evaluation and it was decided that pt would struggle from placement in a short term rehab 2* dementia/behaviors  Noted to be true during evaluation as pt became overwhelmed and confused by therapists, and only responded well to cues and direction from her dtr  Spoke with pts dtr about hiring private duty aides to assist her in the home as she is having difficulty managing her mothers care; dtr agreeable  Dtr also agreeable to home therapy services  Spoke with pts daughter about home setup as her bed/bath are currently on 2nd floor  Dtr is agreeable to hospital bed on the first floor (with commode that she already has), since pt is having difficulty with stairs  Pt to benefit from continued skilled OT tx while in the hospital to address deficits as defined above and maximize level of functional independence w ADL's and functional mobility  Occupational Performance areas to address include: eating, grooming, bathing/shower, toilet hygiene, dressing, functional mobility and functional transfers, bed mobility  Based on findings, pt is of high complexity  At this time, OT recommendations at time of discharge are 24hr family assist, private duty home care (as many hours as financially feasible for the family), and home OT/PT  Goals   Patient Goals none stated   Plan   Treatment Interventions ADL retraining;Functional transfer training;UE strengthening/ROM; Endurance training;Cognitive reorientation;Patient/family training;Equipment evaluation/education; Compensatory technique education;Continued evaluation; Energy conservation   Goal Expiration Date 11/23/19   OT Frequency 2-3x/wk   Recommendation   [facility]   Hospital bed   OT Discharge Recommendation Home OT  (& 24hr family SPV & private duty aide)   OT - OK to Discharge Yes  (w 24hr support/home OT/HHA)   [place]hel Index   Feeding 5   Bathing 0   Grooming Score 0   Dressing Score 0   Bladder Score 10   Bowels Score 10   Toilet Use Score 5   Transfers (Bed/Chair) Score 5   Mobility (Level Surface) Score 0   Stairs Score 0   Barthel Index Score 35     Pt will achieve the following goals within 10 days  *Pt will complete grooming with setup/supervision  *Pt will complete UB bathing and dressing with setup/supervision  *Pt will complete LB bathing and dressing with Min A      *Pt will complete toileting (hygiene and clothing management) with supervision    *Pt will complete bed mobility with supervision, with bed flat and no side rail to prep for purposeful tasks    *Pt will perform functional transfers with supervision/CGA in order to complete ADL routine  *Pt will increase standing tolerance to 3+ minutes in order to complete sinkside ADL (lia care)  *Pt will demonstrate increased activity tolerance in order to complete ADL routine  *Pt will sit on EOB for 5+ minutes for increased safety with seated activity tolerance during ADL tasks      [employer], OT

## 2019-11-13 NOTE — PROGRESS NOTES
Patient became a little anxious while sitting in the chair  Patient offered to use the commode and had 1 unmeasured urinary occurrence  Patient was put back to bed an resting comfortably  Noted patient had some dyspnea on exertion  Patient is currently resting bed, no SOB or complaints at time  Patient's daughter will be coming back tonight to stay with patient  Will continue to monitor

## 2019-11-13 NOTE — PLAN OF CARE
Problem: OCCUPATIONAL THERAPY ADULT  Goal: Performs self-care activities at highest level of function for planned discharge setting  See evaluation for individualized goals  Outcome: Progressing  Note:   Limitation: Decreased ADL status, Decreased Safe judgement during ADL, Decreased cognition, Decreased endurance, Decreased self-care trans  Prognosis: Fair  Assessment: Pt is a 80 y o  female seen for OT evaluation at [facility], admitted 11/12/2019 w/ Weakness  OT completed extensive review of pt's medical and social history  Comorbidities affecting pt's functional performance at time of assessment include: unsteady gait, HTN, asthma, depression  Personal factors affecting pt at time of IE include:steps to enter environment, behavioral pattern, difficulty performing ADLS, limited insight into deficits, compliance and health management   Prior to admission, pt was living in [facility] with daughter and was assisted with ADLs and ambulation  Per dtr, pt refuses to use a RW but does not attempt to walk without her daughters help  Upon evaluation, pt presents to OT below baseline due to the following performance deficits: weakness, decreased strength, decreased balance, decreased tolerance, impaired memory, impaired sequencing, impaired problem solving, impulsivity and decreased safety awareness  Spoke with dtr at length during evaluation and it was decided that pt would struggle from placement in a short term rehab 2* dementia/behaviors  Noted to be true during evaluation as pt became overwhelmed and confused by therapists, and only responded well to cues and direction from her dtr  Spoke with pts dtr about hiring private duty aides to assist her in the home as she is having difficulty managing her mothers care; dtr agreeable  Dtr also agreeable to home therapy services   Pt to benefit from continued skilled OT tx while in the hospital to address deficits as defined above and maximize level of functional independence w ADL's and functional mobility  Occupational Performance areas to address include: eating, grooming, bathing/shower, toilet hygiene, dressing, functional mobility and functional transfers, bed mobility  Based on findings, pt is of high complexity  At this time, OT recommendations at time of discharge are 24hr family assist, private duty home care (as many hours as financially feasible for the family), and home OT/PT       OT Discharge Recommendation: Home OT(& 24hr family SPV & private duty aide)  OT - OK to Discharge: Yes(w 24hr support/home OT/HHA)

## 2019-11-13 NOTE — H&P
H&P- Sha Daley 6/10/1921, 80 y o  female MRN: [id number]    Unit/Bed#: [address] [address] Encounter: [id number]    Primary Care Provider: Colette Murrell MD   Date and time admitted to hospital: 11/12/2019  6:30 PM        * Weakness  Assessment & Plan  Patient is a 14-year-old female who lives with his family and has a bedroom upstairs  A last couple weeks patient has increasing weakness and an inability to get up the stairs  Case management evaluation for placement  PT OT for walking assistance    Unsteady gait  Assessment & Plan  Probably age related  Patient also is demented and very hard of hearing          VTE Prophylaxis: Enoxaparin (Lovenox)  / sequential compression device   Code Status: needs to be adressed   POLST: POLST form is not discussed and not completed at this time  Anticipated Length of Stay:  Patient will be admitted on an Observation basis with an anticipated length of stay of  <  2 midnights  Justification for Hospital Stay:  Weakness    Total Time for Visit, including Counseling / Coordination of Care: 30 minutes  Greater than 50% of this total time spent on direct patient counseling and coordination of care  Chief Complaint:   Weakness    History of Present Illness:      Sha Daley is a 80 y o  female who medical history of AFib in osteoarthritis patient takes mostly blood pressure medication and Lopressor, patient's bedroom is upstairs and family is unable to get her up the steps anymore ambulatory dysfunction severe  Per daughter mild dementia but appears to be more severe  Review of Systems:    Review of Systems   Unable to perform ROS: Dementia       Past Medical and Surgical History:     Past Medical History:   Diagnosis Date    A-fib ([facility] [address] )     Osteoarthritis        History reviewed  No pertinent surgical history  Meds/Allergies:    Prior to Admission medications    Medication Sig Start Date End Date Taking?  Authorizing Provider   brinzolamide (AZOPT) 1 % ophthalmic suspension Apply to eye daily at bedtime    Yes Historical Provider, MD   losartan (COZAAR) 100 MG tablet TAKE 1 TABLET DAILY 11/10/19  Yes Sam Crum MD   metoprolol succinate (TOPROL-XL) 50 mg 24 hr tablet TAKE 1 TABLET DAILY 11/28/18  Yes Afshan Garrett PA-C   omeprazole (PriLOSEC) 20 mg delayed release capsule TAKE 1 CAPSULE DAILY 2/13/19  Yes Sam Crum MD   PARoxetine (PAXIL) 30 mg tablet TAKE 1 TABLET EVERY MORNING 10/7/19  Yes Sam Crum MD   albuterol [facility] HFA) 90 mcg/act inhaler Inhale 2 puffs every 4 (four) hours as needed for wheezing or shortness of breath 2/13/19   Sam Crum MD   nystatin (MYCOSTATIN) cream Apply topically 2 (two) times a day 5/19/17   Historical Provider, MD     I have reviewed home medications with patient family member  Allergies: Allergies   Allergen Reactions    Sulfa Antibiotics Rash     per t sys    Sulfamethoxazole-Trimethoprim Rash       Social History:     Marital Status:    Occupation: none  Patient Pre-hospital Living Situation: home   Patient Pre-hospital Level of Mobility:  Walks with her walker  Patient Pre-hospital Diet Restrictions: none   Substance Use History:   Social History     Substance and Sexual Activity   Alcohol Use No     Social History     Tobacco Use   Smoking Status Former Smoker   Smokeless Tobacco Never Used     Social History     Substance and Sexual Activity   Drug Use No       Family History:    Family History   Problem Relation Age of Onset    No Known Problems Mother     No Known Problems Father        Physical Exam:     Vitals:   Blood Pressure: (!) 212/98 (11/12/19 2312)  Pulse: 77 (11/12/19 2312)  Temperature: 97 9 °F (36 6 °C) (11/12/19 2312)  Temp Source: Oral (11/12/19 2312)  Respirations: (!) 25 (11/12/19 2312)  Weight - Scale: 51 9 kg (114 lb 8 oz) (11/12/19 2312)  SpO2: 97 % (11/12/19 1834)    Physical Exam   Constitutional: She appears well-developed and well-nourished  No distress  HENT:   Head: Normocephalic and atraumatic  Eyes: Pupils are equal, round, and reactive to light  Conjunctivae and EOM are normal  No scleral icterus  Neck: Normal range of motion  Neck supple  No JVD present  No tracheal deviation present  Cardiovascular: Normal rate, regular rhythm, normal heart sounds and intact distal pulses  Exam reveals no friction rub  No murmur heard  Pulmonary/Chest: Effort normal and breath sounds normal  No stridor  No respiratory distress  She has no wheezes  She has no rales  Abdominal: Soft  Bowel sounds are normal  She exhibits no distension  There is no tenderness  Musculoskeletal: Normal range of motion  She exhibits edema  Neurological: She has normal reflexes  Confused hard of hearing    Skin: Skin is warm and dry  Capillary refill takes less than 2 seconds  She is not diaphoretic  Psychiatric: She has a normal mood and affect  Her behavior is normal    Nursing note and vitals reviewed  Additional Data:     Lab Results: I have personally reviewed pertinent reports  Results from last 7 days   Lab Units 11/12/19 1952   WBC Thousand/uL 7 31   HEMOGLOBIN g/dL 14 4   HEMATOCRIT % 44 9   PLATELETS Thousands/uL 309   NEUTROS PCT % 73   LYMPHS PCT % 16   MONOS PCT % 10   EOS PCT % 1     Results from last 7 days   Lab Units 11/12/19 1953   POTASSIUM mmol/L 3 9   CHLORIDE mmol/L 101   CO2 mmol/L 31   BUN mg/dL 11   CREATININE mg/dL 0 61   CALCIUM mg/dL 8 7   ALK PHOS U/L 60   ALT U/L 17   AST U/L 21     Results from last 7 days   Lab Units 11/12/19 1952   INR  1 00       Imaging: I have personally reviewed pertinent reports  No results found  EKG, Pathology, and Other Studies Reviewed on Admission:   · EKG: a fib    Epic / Care Everywhere Records Reviewed: Yes     ** Please Note: This note has been constructed using a voice recognition system   **

## 2019-11-13 NOTE — PLAN OF CARE
Problem: PHYSICAL THERAPY ADULT  Goal: Performs mobility at highest level of function for planned discharge setting  See evaluation for individualized goals  Description  Treatment/Interventions: Functional transfer training, LE strengthening/ROM, Therapeutic exercise, Elevations, Endurance training, Cognitive reorientation, Equipment eval/education, Patient/family training, Bed mobility, Gait training, Spoke to nursing, OT  Equipment Recommended: (Patient refuses to use an AD)       See flowsheet documentation for full assessment, interventions and recommendations  Note:   Prognosis: Fair  Problem List: Decreased strength, Decreased endurance, Impaired balance, Decreased mobility, Decreased cognition, Impaired judgement, Decreased safety awareness, Impaired hearing  Assessment: Patient is a 89y/o F who presents after increased weakness over the last couple of weeks  She had a fall before coming to the ED where her knees buckled  PMH significant for A-fib, OA, HTN, dementia  Lives with Dtr in a [facility] with steps to enter and a full flight to the second floor  Patient was at a supervision level for all mobility prior to admission and was not using an AD  Dtr provides supervision for mobility and assists with ADL's/IADL's  Current medical status includes high fall risk, decreased cognition, very [place], agitation, inability to follow direction, poor safety awareness, decreased strength, balance, endurance and mobility  Patient performed transfers and amb with assist of two  Very fatigued and unsteady on her feet  Unwilling to use an AD  Unable to perform full eval including strength and sensation testing as patient was unable to hear and due to poor cognition  Dtr would prefer for patient to return home due to her dementia  Recommending 24 hr supervision/assist, 1st floor setup and home P T     Barriers to Discharge: Decreased caregiver support     Recommendation: 24 hour supervision/assist, Home PT          See flowsheet documentation for full assessment

## 2019-11-13 NOTE — ED PROVIDER NOTES
History  Chief Complaint   Patient presents with    Gait Problem     To ED via EMS for evaluation of gait disturbance and family concern that they are unable to assist patient up the stairs to bed  Brought in by daughter for evaluation of generlized weakness and ambulatory dysfunction  Pt usually able to ambulate upstairs w/ assistance to go to bed and last night when daughter helped her out of the recline, pts knees buckled and went down to the floor  Needed significant assitance getting her up the stairs  Pt did have an episode of urinary incontinence which is unusual for her  Today, still weak  Denies f/c/s, no cp/pressure, notes mild non-productive cough, no sob/castro  No abd pain, appetite okay, no n/v, no changes in urination  No dysuria or frequency  No recent changes in meds, no recent illness, no sick contact  History provided by:  Patient and relative   used: No    Malaise - 7 years or greater   Severity:  Moderate  Onset quality:  Gradual  Timing:  Constant  Progression:  Waxing and waning  Chronicity:  New  Context: not change in medication, not recent infection and not stress    Relieved by:  None tried  Worsened by:  Nothing  Ineffective treatments:  None tried  Associated symptoms: difficulty walking and falls    Associated symptoms: no abdominal pain, no anorexia, no arthralgias, no chest pain, no diarrhea, no dysuria, no numbness in extremities, no fever, no foul-smelling urine, no frequency, no lethargy, no nausea, no near-syncope, no shortness of breath and no vomiting    Risk factors: no new medications        Prior to Admission Medications   Prescriptions Last Dose Informant Patient Reported? Taking?    PARoxetine (PAXIL) 30 mg tablet 11/12/2019 at Unknown time  No Yes   Sig: TAKE 1 TABLET EVERY MORNING   albuterol (PROAIR HFA) 90 mcg/act inhaler Unknown at Unknown time  No No   Sig: Inhale 2 puffs every 4 (four) hours as needed for wheezing or shortness of breath   brinzolamide (AZOPT) 1 % ophthalmic suspension 11/11/2019 at Unknown time Self Yes Yes   Sig: Apply to eye daily at bedtime    losartan (COZAAR) 100 MG tablet 11/12/2019 at Unknown time  No Yes   Sig: TAKE 1 TABLET DAILY   metoprolol succinate (TOPROL-XL) 50 mg 24 hr tablet 11/12/2019 at Unknown time  No Yes   Sig: TAKE 1 TABLET DAILY   nystatin (MYCOSTATIN) cream Not Taking at Unknown time Self Yes No   Sig: Apply topically 2 (two) times a day   omeprazole (PriLOSEC) 20 mg delayed release capsule 11/12/2019 at Unknown time  No Yes   Sig: TAKE 1 CAPSULE DAILY      Facility-Administered Medications: None       Past Medical History:   Diagnosis Date    A-fib ([facility] )     Osteoarthritis        History reviewed  No pertinent surgical history  Family History   Problem Relation Age of Onset    No Known Problems Mother     No Known Problems Father      I have reviewed and agree with the history as documented  Social History     Tobacco Use    Smoking status: Former Smoker    Smokeless tobacco: Never Used   Substance Use Topics    Alcohol use: No    Drug use: No        Review of Systems   Constitutional: Negative for fever  Respiratory: Negative for shortness of breath  Cardiovascular: Negative for chest pain and near-syncope  Gastrointestinal: Negative for abdominal pain, anorexia, diarrhea, nausea and vomiting  Genitourinary: Negative for dysuria and frequency  Musculoskeletal: Positive for falls  Negative for arthralgias  All other systems reviewed and are negative  Physical Exam  Physical Exam   Constitutional: She is oriented to person, place, and time  She appears well-developed and well-nourished  HENT:   Nose: Nose normal    Mouth/Throat: Oropharynx is clear and moist    Eyes: Conjunctivae are normal    Neck: Neck supple  Cardiovascular: Normal rate and regular rhythm  Pulmonary/Chest: Effort normal  She has decreased breath sounds in the right lower field   She has no wheezes  She has no rhonchi  Abdominal: Soft  There is no tenderness  Musculoskeletal: She exhibits no deformity  Neurological: She is alert and oriented to person, place, and time  She has normal strength  No cranial nerve deficit or sensory deficit  GCS eye subscore is 4  GCS verbal subscore is 5  GCS motor subscore is 6  Skin: Skin is warm  Psychiatric: She has a normal mood and affect  Nursing note and vitals reviewed        Vital Signs  ED Triage Vitals [11/12/19 1834]   Temperature Pulse Respirations Blood Pressure SpO2   (!) 96 5 °F (35 8 °C) 63 14 (!) 194/88 97 %      Temp Source Heart Rate Source Patient Position - Orthostatic VS BP Location FiO2 (%)   Temporal Monitor Sitting Right arm --      Pain Score       No Pain           Vitals:    11/12/19 1834 11/12/19 2137 11/12/19 2312   BP: (!) 194/88 (!) 208/95 (!) 212/98   Pulse: 63 59 77   Patient Position - Orthostatic VS: Sitting Lying Lying         Visual Acuity      ED Medications  Medications   albuterol inhalation solution 5 mg (has no administration in time range)   hydrALAZINE (APRESOLINE) injection 10 mg (has no administration in time range)   influenza vaccine, high-dose (FLUZONE HIGH-DOSE) IM injection MARICARMEN 0 5 mL (has no administration in time range)   sodium chloride 0 9 % bolus 1,000 mL (0 mL Intravenous Stopped 11/12/19 2152)   hydrALAZINE (APRESOLINE) injection 5 mg (5 mg Intravenous Given 11/12/19 2201)   hydrALAZINE (APRESOLINE) tablet 25 mg (25 mg Oral Refused 11/12/19 2345)       Diagnostic Studies  Results Reviewed     Procedure Component Value Units Date/Time    Urine Microscopic [[id number]]  (Abnormal) Collected:  11/12/19 2106    Lab Status:  Final result Specimen:  Urine, Clean Catch Updated:  11/12/19 2137     RBC, UA 0-1 /hpf      WBC, UA 0-1 /hpf      Epithelial Cells Occasional /hpf      Bacteria, UA Occasional /hpf     UA w Reflex to Microscopic [[id number]]  (Abnormal) Collected:  11/12/19 2106    Lab Status:  Final result Specimen:  Urine, Clean Catch Updated:  11/12/19 2136     Color, UA Straw     Clarity, UA Clear     Specific Gravity, UA 1 010     pH, UA 6 5     Leukocytes, UA Negative     Nitrite, UA Negative     Protein, UA Negative mg/dl      Glucose, UA Negative mg/dl      Ketones, UA Negative mg/dl      Urobilinogen, UA 0 2 E U /dl      Bilirubin, UA Negative     Blood, UA Trace-Intact    Troponin I [[id number]]  (Normal) Collected:  11/12/19 1952    Lab Status:  Final result Specimen:  Blood from Arm, Right Updated:  11/12/19 2047     Troponin I <0 02 ng/mL     Comprehensive metabolic panel [[id number]]  (Abnormal) Collected:  11/12/19 1953    Lab Status:  Final result Specimen:  Blood from Arm, Right Updated:  11/12/19 2044     Sodium 133 mmol/L      Potassium 3 9 mmol/L      Chloride 101 mmol/L      CO2 31 mmol/L      ANION GAP 1 mmol/L      BUN 11 mg/dL      Creatinine 0 61 mg/dL      Glucose 75 mg/dL      Calcium 8 7 mg/dL      AST 21 U/L      ALT 17 U/L      Alkaline Phosphatase 60 U/L      Total Protein 6 0 g/dL      Albumin 2 6 g/dL      Total Bilirubin 0 30 mg/dL      eGFR 76 ml/min/1 73sq m     Narrative:       [place] guidelines for Chronic Kidney Disease (CKD):     Stage 1 with normal or high GFR (GFR > 90 mL/min/1 73 square meters)    Stage 2 Mild CKD (GFR = 60-89 mL/min/1 73 square meters)    Stage 3A Moderate CKD (GFR = 45-59 mL/min/1 73 square meters)    Stage 3B Moderate CKD (GFR = 30-44 mL/min/1 73 square meters)    Stage 4 Severe CKD (GFR = 15-29 mL/min/1 73 square meters)    Stage 5 End Stage CKD (GFR <15 mL/min/1 73 square meters)  Note: GFR calculation is accurate only with a steady state creatinine    Protime-INR [[id number]]  (Normal) Collected:  11/12/19 1952    Lab Status:  Final result Specimen:  Blood from Arm, Right Updated:  11/12/19 2042     Protime 12 9 seconds      INR 1 00    APTT [[id number]]  (Abnormal) Collected:  11/12/19 1952    Lab Status:  Final result Specimen:  Blood from Arm, Right Updated:  11/12/19 2042     PTT 39 seconds     CBC and differential [[id number]]  (Abnormal) Collected:  11/12/19 1952    Lab Status:  Final result Specimen:  Blood from Arm, Right Updated:  11/12/19 2028     WBC 7 31 Thousand/uL      RBC 4 50 Million/uL      Hemoglobin 14 4 g/dL      Hematocrit 44 9 %       fL      MCH 32 0 pg      MCHC 32 1 g/dL      RDW 11 4 %      MPV 9 4 fL      Platelets 385 Thousands/uL      nRBC 0 /100 WBCs      Neutrophils Relative 73 %      Immat GRANS % 0 %      Lymphocytes Relative 16 %      Monocytes Relative 10 %      Eosinophils Relative 1 %      Basophils Relative 0 %      Neutrophils Absolute 5 30 Thousands/µL      Immature Grans Absolute 0 02 Thousand/uL      Lymphocytes Absolute 1 17 Thousands/µL      Monocytes Absolute 0 71 Thousand/µL      Eosinophils Absolute 0 08 Thousand/µL      Basophils Absolute 0 03 Thousands/µL                  XR chest 2 views   ED Interpretation by Marcelina Camarillo DO (11/12 2219)   Abnormal   Right side - small area of ?scarring  No white count or fever, doubt infiltrate                 Procedures  Procedures       ED Course  ED Course as of Nov 12 2355   Tue Nov 12, 2019   2222 Ambulated in department - initially okay, but then required assistance  When placed back in bed, noted to have pulse ox of only 80%  With rest, slowly came back up to normal       D/w daughter, Jose R Rivera  Contacted [facility] for admission                                  MDM  Number of Diagnoses or Management Options  Hypoxia: new and requires workup  Weakness: new and requires workup  Diagnosis management comments: Generalized weakness x2d per family  Pt has no complaints   Will ck labs, ekg, cxr, ua and re-eval   May need admission for pt eval / case management tomorrow if pt unable to ambulate safely       Amount and/or Complexity of Data Reviewed  Clinical lab tests: reviewed and ordered  Tests in the radiology section of CPT®: ordered and reviewed  Tests in the medicine section of CPT®: ordered and reviewed  Obtain history from someone other than the patient: yes  Independent visualization of images, tracings, or specimens: yes        Disposition  Final diagnoses:   Weakness   Hypoxia - with exertion     Time reflects when diagnosis was documented in both MDM as applicable and the Disposition within this note     Time User Action Codes Description Comment    11/12/2019 10:24 PM Delisa Rosales Add [R53 1] Weakness     11/12/2019 10:24 PM Delisa Rosales Add [R09 02] Hypoxia     11/12/2019 10:24 PM Donna STAFFORD Modify [R09 02] Hypoxia with exertion      ED Disposition     ED Disposition Condition Date/Time Comment    Admit Stable Tue Nov 12, 2019 10:25 PM Case was discussed with Axel Zepeda and the patient's admission status was agreed to be Admission Status: observation status to the service of Dr Darian Sofia           Follow-up Information    None         Current Discharge Medication List      CONTINUE these medications which have NOT CHANGED    Details   brinzolamide (AZOPT) 1 % ophthalmic suspension Apply to eye daily at bedtime       losartan (COZAAR) 100 MG tablet TAKE 1 TABLET DAILY  Qty: 90 tablet, Refills: 4    Associated Diagnoses: Essential hypertension      metoprolol succinate (TOPROL-XL) 50 mg 24 hr tablet TAKE 1 TABLET DAILY  Qty: 90 tablet, Refills: 3    Associated Diagnoses: Essential hypertension      omeprazole (PriLOSEC) 20 mg delayed release capsule TAKE 1 CAPSULE DAILY  Qty: 90 capsule, Refills: 3    Associated Diagnoses: Gastroesophageal reflux disease without esophagitis      PARoxetine (PAXIL) 30 mg tablet TAKE 1 TABLET EVERY MORNING  Qty: 90 tablet, Refills: 4    Associated Diagnoses: Dysthymia      albuterol (PROAIR HFA) 90 mcg/act inhaler Inhale 2 puffs every 4 (four) hours as needed for wheezing or shortness of breath  Qty: 1 Inhaler, Refills: 3    Associated Diagnoses: Mild intermittent asthma without complication      nystatin (MYCOSTATIN) cream Apply topically 2 (two) times a day           No discharge procedures on file      ED Provider  Electronically Signed by           Navi Lewis DO  11/12/19 0672

## 2019-11-13 NOTE — TELEPHONE ENCOUNTER
Patient is currently admitted in [facility]  Darrell Holcomb called today asking for you  She was hoping you could stop in and see her mom  I told her I would send you a message in case she is still there tomorrow

## 2019-11-13 NOTE — NURSING NOTE
Spoke with Norman Chaudhary NP pt  Combative and using bed alarm and tele box as projectiles  Okay to remove tele if patient takes off and throws again

## 2019-11-13 NOTE — SOCIAL WORK
LOS: 0  Patient is not a 30 day re admisson or a Medicare Bundled patient  Met with patient with her daughter and caregiver Austyn Leach present to review the discharge planning process including identifying help at home and patient preference for discharge planning  Patient resides with her daughter in a 2 story home with 4 TOBY  The only bed and bath are on the 2nd floor  Patient recently has had much difficulty climbing steps and Austyn Leach is considering renting a HB or securing a single bed to have a 1st floor set up  Patient is independent of ADL's and does not use a assistive device  There is a RW at home from another family member  Patient uses a mail order system for chronic meds and [facility] in [place] for new ones  Patient has a POA/AD, was asked to bring it in  Patient has no history of BHU, SNF or D &A rehab admission  Austyn Leach plans to take her mother home, discussed need for VNA and she is agreeable  List provided and Freedom of Choice given, she is requesting a referral to [facility]NA[facility]'s, referral sent to [facility] via [facility]  Referral was also made to [facility] at [phone] for possible waiver program   Kassandra Chanel given a list of local private duty HHA agencies to try and secure additional help at home

## 2019-11-14 ENCOUNTER — TELEPHONE (OUTPATIENT)
Dept: FAMILY MEDICINE CLINIC | Facility: HOSPITAL | Age: 84
End: 2019-11-14

## 2019-11-14 PROCEDURE — 99232 SBSQ HOSP IP/OBS MODERATE 35: CPT | Performed by: INTERNAL MEDICINE

## 2019-11-14 RX ORDER — METOPROLOL SUCCINATE 50 MG/1
50 TABLET, EXTENDED RELEASE ORAL DAILY
Status: DISCONTINUED | OUTPATIENT
Start: 2019-11-14 | End: 2019-11-15 | Stop reason: HOSPADM

## 2019-11-14 RX ORDER — HYDRALAZINE HYDROCHLORIDE 20 MG/ML
5 INJECTION INTRAMUSCULAR; INTRAVENOUS EVERY 6 HOURS PRN
Status: DISCONTINUED | OUTPATIENT
Start: 2019-11-14 | End: 2019-11-15

## 2019-11-14 RX ORDER — PANTOPRAZOLE SODIUM 40 MG/1
40 TABLET, DELAYED RELEASE ORAL
Status: DISCONTINUED | OUTPATIENT
Start: 2019-11-14 | End: 2019-11-15 | Stop reason: HOSPADM

## 2019-11-14 RX ORDER — PAROXETINE HYDROCHLORIDE 20 MG/1
30 TABLET, FILM COATED ORAL DAILY
Status: DISCONTINUED | OUTPATIENT
Start: 2019-11-14 | End: 2019-11-14

## 2019-11-14 RX ORDER — LOSARTAN POTASSIUM 50 MG/1
100 TABLET ORAL DAILY
Status: DISCONTINUED | OUTPATIENT
Start: 2019-11-14 | End: 2019-11-15 | Stop reason: HOSPADM

## 2019-11-14 RX ADMIN — BRINZOLAMIDE 1 DROP: 10 SUSPENSION/ DROPS OPHTHALMIC at 21:50

## 2019-11-14 RX ADMIN — METOPROLOL SUCCINATE 50 MG: 50 TABLET, EXTENDED RELEASE ORAL at 11:50

## 2019-11-14 RX ADMIN — LOSARTAN POTASSIUM 100 MG: 50 TABLET, FILM COATED ORAL at 11:50

## 2019-11-14 RX ADMIN — HEPARIN SODIUM 5000 UNITS: 5000 INJECTION INTRAVENOUS; SUBCUTANEOUS at 14:22

## 2019-11-14 RX ADMIN — HEPARIN SODIUM 5000 UNITS: 5000 INJECTION INTRAVENOUS; SUBCUTANEOUS at 06:20

## 2019-11-14 RX ADMIN — HEPARIN SODIUM 5000 UNITS: 5000 INJECTION INTRAVENOUS; SUBCUTANEOUS at 21:50

## 2019-11-14 RX ADMIN — HYDRALAZINE HYDROCHLORIDE 5 MG: 20 INJECTION INTRAMUSCULAR; INTRAVENOUS at 08:58

## 2019-11-14 RX ADMIN — BRINZOLAMIDE 1 DROP: 10 SUSPENSION/ DROPS OPHTHALMIC at 14:25

## 2019-11-14 NOTE — SOCIAL WORK
Patient is for tentative discharge tomorrow to home with [facility] to follow  Daughter Susan Kurtz is getting bed and first floor set up arranged for patient  [facility] to transport patient home at 1500 11/14/2019

## 2019-11-14 NOTE — ASSESSMENT & PLAN NOTE
Patient is a 51-year-old female who lives with his family and has a bedroom upstairs, and within the last couple weeks patient has increasing weakness and an inability to get up the stairs  Case management evaluation for placement  PT OT for walking assistance  No fevers, WBC 11 31,   Urinalysis - no pyuria  CXR - no acute cardiopulmonary disease  PT on board  Patient's mental status improved when daughter arrived, thus family considering taking patient's home as it is a familiar environment and reduce the risk of delirium

## 2019-11-14 NOTE — PROGRESS NOTES
Progress Note - Lore Daley 6/10/1921, 80 y o  female MRN: [id number]    Unit/Bed#: [address] [place] [address] Encounter: [id number]    Primary Care Provider: Serena Alexis MD   Date and time admitted to hospital: 2019  6:30 PM        Unsteady gait  Assessment & Plan  Probably age related  Patient also is demented and very hard of hearing  PT on board    * Weakness  Assessment & Plan  Patient is a 49-year-old female who lives with his family and has a bedroom upstairs, and within the last couple weeks patient has increasing weakness and an inability to get up the stairs  Case management evaluation for placement  PT OT for walking assistance  No fevers, WBC 11 31,   Urinalysis - no pyuria  CXR - no acute cardiopulmonary disease  PT on board  Patient's mental status improved when daughter arrived, thus family considering taking patient's home as it is a familiar environment and reduce the risk of delirium        VTE Pharmacologic Prophylaxis:   Pharmacologic: Heparin  Mechanical VTE Prophylaxis in Place: Yes    Patient Centered Rounds: I have performed bedside rounds with nursing staff today  Education and Discussions with Family / Patient:  Discussed extensively with patient's daughter    Time Spent for Care: 45 minutes  More than 50% of total time spent on counseling and coordination of care as described above  Current Length of Stay: 1 day(s)    Current Patient Status: Inpatient   Certification Statement: The patient will continue to require additional inpatient hospital stay due to Ambulatory dysfunction    Discharge Plan:  Tomorrow    Code Status: Level 1 - Full Code      Subjective:   Overnight patient was agitated and required sedation    This morning she is noted to be sleeping calmly    Objective:     Vitals:   Temp (24hrs), Av 7 °F (37 1 °C), Min:98 5 °F (36 9 °C), Max:98 8 °F (37 1 °C)    Temp:  [98 5 °F (36 9 °C)-98 8 °F (37 1 °C)] 98 5 °F (36 9 °C)  HR:  [86-89] 89  Resp:  [16-18] 16  BP: (184-213)/(87-98) 213/98  SpO2:  [96 %] 96 %  Body mass index is 19 64 kg/m²  Input and Output Summary (last 24 hours):     No intake or output data in the 24 hours ending 11/14/19 1151    Physical Exam:     Physical Exam   Constitutional: No distress  Frail elderly lady   Cardiovascular: Normal rate, regular rhythm and intact distal pulses  Exam reveals no friction rub  Murmur heard  Pulmonary/Chest: Effort normal and breath sounds normal  No stridor  No respiratory distress  She has no wheezes  Abdominal: Soft  Bowel sounds are normal  She exhibits no distension  There is no tenderness  There is no guarding  Musculoskeletal: Normal range of motion  She exhibits no edema, tenderness or deformity  Neurological: She is alert  No cranial nerve deficit  Awake, hard of hearing, not oriented to place or time   Skin: Skin is warm  She is not diaphoretic  Psychiatric: She has a normal mood and affect  Additional Data:     Labs:    Results from last 7 days   Lab Units 11/13/19 0440   WBC Thousand/uL 11 31*   HEMOGLOBIN g/dL 13 8   HEMATOCRIT % 42 8   PLATELETS Thousands/uL 305   NEUTROS PCT % 75   LYMPHS PCT % 17   MONOS PCT % 8   EOS PCT % 0     Results from last 7 days   Lab Units 11/13/19 0440 11/12/19 1953   SODIUM mmol/L 133* 133*   POTASSIUM mmol/L 4 3 3 9   CHLORIDE mmol/L 101 101   CO2 mmol/L 29 31   BUN mg/dL 15 11   CREATININE mg/dL 0 70 0 61   ANION GAP mmol/L 3* 1*   CALCIUM mg/dL 9 8 8 7   ALBUMIN g/dL  --  2 6*   TOTAL BILIRUBIN mg/dL  --  0 30   ALK PHOS U/L  --  60   ALT U/L  --  17   AST U/L  --  21   GLUCOSE RANDOM mg/dL 99 75     Results from last 7 days   Lab Units 11/12/19 1952   INR  1 00                       * I Have Reviewed All Lab Data Listed Above  * Additional Pertinent Lab Tests Reviewed:  [facility]      Recent Cultures (last 7 days):           Last 24 Hours Medication List:     Current Facility-Administered Medications:  albuterol 5 mg Nebulization Once amadeo Nieves PA-C   brinzolamide 1 drop Both Eyes Q8H [facility]-C   heparin (porcine) 5,000 Units Subcutaneous Q8H [facility] ABHAY Nieves   hydrALAZINE 5 mg Intravenous Q6H PRN Lulu Rich MD   losartan 100 mg Oral Daily Lulu Rich MD   melatonin 3 mg Oral HS Emely Nieves PA-C   metoprolol succinate 50 mg Oral Daily Lulu Rich MD   nystatin  Topical BID PRN Lulu Rich MD   pantoprazole 40 mg Oral Early Morning Lulu Rich MD        Today, Patient Was Seen By: Lulu Rich MD    ** Please Note: Dictation voice to text software may have been used in the creation of this document   **

## 2019-11-14 NOTE — TRANSPORTATION MEDICAL NECESSITY
Section I - General Information    Name of Patient: Gela Askew                 : 6/10/1921    Medicare #: [id number]  Transport Date: 19 (PCS is valid for round trips on this date and for all repetitive trips in the 60-day range as noted below )  Origin: [facility] 2:  [address] [facility]   Is the pt's stay covered under Medicare Part A (PPS/DRG)   [x]     Closest appropriate facility? If no, why is transport to more distant facility required? Yes  If hospice pt, is this transport related to pt's terminal illness? N/A      Section II - Medical Necessity Questionnaire  Ambulance transportation is medically necessary only if other means of transport are contraindicated or would be potentially harmful to the patient  To meet this requirement, the patient must either be "bed confined" or suffer from a condition such that transport by means other than ambulance is contraindicated by the patient's condition  The following questions must be answered by the medical professional signing below for this form to be valid:    1)  Describe the MEDICAL CONDITION (physical and/or mental) of this patient AT [address] [place] [address] that requires the patient to be transported in an ambulance and why transport by other means is contraindicated by the patient's condition:   Patient is confused, assist of 2 to get oob, fall risk, poor safety awareness    2) Is the patient "bed confined" as defined below? NO  To be "be confined" the patient must satisfy all three of the following conditions: (1) unable to get up from bed without Assistance; AND (2) unable to ambulate; AND (3) unable to sit in a chair or wheelchair  3) Can this patient safely be transported by car or wheelchair van (i e , seated during transport without a medical attendant or monitoring)?    NO    4) In addition to completing questions 1-3 above, please check any of the following conditions that apply*:   *Note: supporting documentation for any boxes checked must be maintained in the patient's medical records  If hosp-hosp transfer, describe services needed at 2nd facility not available at 1st facility? Patient is confused      Section III - Signature of Physician or Healthcare Professional  I certify that the above information is true and correct based on my evaluation of this patient, and represent that the patient requires transport by ambulance and that other forms of transport are contraindicated  I understand that this information will be used by the Centers for Medicare and Medicaid Services (CMS) to support the determination of medical necessity for ambulance services, and I represent that I have personal knowledge of the patient's condition at time of transport  []  If this box is checked, I also certify that the patient is physically or mentally incapable of signing the ambulance service's claim and that the institution with which I am affiliated has furnished care, services, or assistance to the patient  My signature below is made on behalf of the patient pursuant to 42 CFR §424 36(b)(4)  In accordance with 42 CFR §424 37, the specific reason(s) that the patient is physically or mentally incapable of signing the claim form is as follows:       Signature of Physician* or Healthcare Professional______________________________________________________________  Signature Date 11/14/19 (For scheduled repetitive transports, this form is not valid for transports performed more than 60 days after this date)    Printed Name & Credentials of Physician or Healthcare Professional (MD, DO, RN, etc )__Sandra Warren______________________________  *Form must be signed by patient's attending physician for scheduled, repetitive transports   For non-repetitive, unscheduled ambulance transports, if unable to obtain the signature of the attending physician, any of the following may sign (choose appropriate option below)  [] Physician Assistant []  Clinical Nurse Specialist [x]  Registered Nurse  []  Nurse Practitioner  [x] Discharge Planner

## 2019-11-15 ENCOUNTER — TELEPHONE (OUTPATIENT)
Dept: FAMILY MEDICINE CLINIC | Facility: HOSPITAL | Age: 84
End: 2019-11-15

## 2019-11-15 VITALS
WEIGHT: 109.13 LBS | OXYGEN SATURATION: 96 % | RESPIRATION RATE: 18 BRPM | BODY MASS INDEX: 18.73 KG/M2 | TEMPERATURE: 98.3 F | SYSTOLIC BLOOD PRESSURE: 172 MMHG | HEART RATE: 115 BPM | DIASTOLIC BLOOD PRESSURE: 80 MMHG

## 2019-11-15 PROCEDURE — 99238 HOSP IP/OBS DSCHRG MGMT 30/<: CPT | Performed by: FAMILY MEDICINE

## 2019-11-15 PROCEDURE — 0HBRXZZ EXCISION OF TOE NAIL, EXTERNAL APPROACH: ICD-10-PCS | Performed by: PODIATRIST

## 2019-11-15 RX ORDER — LABETALOL 20 MG/4 ML (5 MG/ML) INTRAVENOUS SYRINGE
10 EVERY 6 HOURS PRN
Status: DISCONTINUED | OUTPATIENT
Start: 2019-11-15 | End: 2019-11-15 | Stop reason: HOSPADM

## 2019-11-15 RX ADMIN — METOPROLOL SUCCINATE 50 MG: 50 TABLET, EXTENDED RELEASE ORAL at 12:28

## 2019-11-15 RX ADMIN — HYDRALAZINE HYDROCHLORIDE 5 MG: 20 INJECTION INTRAMUSCULAR; INTRAVENOUS at 12:44

## 2019-11-15 RX ADMIN — LOSARTAN POTASSIUM 100 MG: 50 TABLET, FILM COATED ORAL at 12:30

## 2019-11-15 RX ADMIN — HEPARIN SODIUM 5000 UNITS: 5000 INJECTION INTRAVENOUS; SUBCUTANEOUS at 05:29

## 2019-11-15 NOTE — CONSULTS
Consult - Podiatry   Saadia Daley 80 y o  female MRN: [id number]  Unit/Bed#: [address] [place] [address] 207-01 Encounter: [id number]    Assessment/Plan   Onychomycosis x10 / PVD Shazia Benders Toes Bilat   - Debridement mycotic nails x10 (05893)   - Q8 (B1,B2abc)  Meets class findings requirement   - recommend patient follow up in 3 months for RFC    History of Present Illness     HPI:  Saadia Daley is a 80 y o  female  Admitted to [facility] with primary diagnosis of progressive weakness and ambulatory dysfunction  Podiatry consult requested to evaluate chronic nail pathology due to patient's inability to care for them herself  She does relate pain and discomfort from her nails due to the thickness and shoe irritation  Patient's daughter present contributing to the history  Consults  Review of Systems   Constitutional: Negative  HENT: Negative  Eyes: Negative  Respiratory: Negative  Cardiovascular:   History AFib    Gastrointestinal: Negative  Musculoskeletal:  Generalized weakness   Skin: thickened painful nails   Neurological:  negative        Historical Information   Past Medical History:   Diagnosis Date    A-fib ([facility] [address] )     Osteoarthritis      History reviewed  No pertinent surgical history    Social History   Social History     Substance and Sexual Activity   Alcohol Use No     Social History     Substance and Sexual Activity   Drug Use No     Social History     Tobacco Use   Smoking Status Former Smoker   Smokeless Tobacco Never Used     Family History:   Family History   Problem Relation Age of Onset    No Known Problems Mother     No Known Problems Father        Meds/Allergies   Medications Prior to Admission   Medication    brinzolamide (AZOPT) 1 % ophthalmic suspension    losartan (COZAAR) 100 MG tablet    metoprolol succinate (TOPROL-XL) 50 mg 24 hr tablet    omeprazole (PriLOSEC) 20 mg delayed release capsule    PARoxetine (PAXIL) 30 mg tablet    albuterol (PROAIR HFA) 90 mcg/act inhaler    nystatin (MYCOSTATIN) cream     Allergies   Allergen Reactions    Sulfa Antibiotics Rash     per t sys    Sulfamethoxazole-Trimethoprim Rash       Objective   First Vitals:   Blood Pressure: (!) 194/88 (11/12/19 1834)  Pulse: 63 (11/12/19 1834)  Temperature: (!) 96 5 °F (35 8 °C) (11/12/19 1834)  Temp Source: Temporal (11/12/19 1834)  Respirations: 14 (11/12/19 1834)  Weight - Scale: 52 2 kg (115 lb) (11/12/19 1834)  SpO2: 97 % (11/12/19 1834)    Current Vitals:   Blood Pressure: (!) 210/98 (11/15/19 1228)  Pulse: (!) 115 (11/15/19 1228)  Temperature: 98 3 °F (36 8 °C) (11/15/19 0753)  Temp Source: Axillary (11/15/19 0753)  Respirations: 18 (11/15/19 0753)  Weight - Scale: 49 5 kg (109 lb 2 oz) (11/15/19 0540)  SpO2: 96 % (11/15/19 0753)        BP (!) 210/98   Pulse (!) 115   Temp 98 3 °F (36 8 °C) (Axillary)   Resp 18   Wt 49 5 kg (109 lb 2 oz)   SpO2 96%   Breastfeeding? No   BMI 18 73 kg/m²     General Appearance:    Alert, cooperative, no distress   Head:    Normocephalic, without obvious abnormality, atraumatic   Eyes:    PERRL, conjunctiva/corneas clear, EOM's intact            Nose:   Moist mucous membranes, no drainage or sinus tenderness   Throat:   No tenderness, no exudates   Neck:   Supple, symmetrical, trachea midline, no JVD   Back:     Symmetric, no CVA tenderness   Lungs:     Respirations unlabored   Chest wall:    No tenderness or deformity   Heart:    Regular rate and rhythm noted on last vitals  Abdomen:     Soft, non-tender, bowel sounds active all four quadrants,     no masses, no organomegaly       Lower Ext/Ortho:    No gross deformities noted of either lower extremity     Pulses:  Right:  1/4 DP, 0/4 PT, Left:  1/4 DP, 0/4 PT,    Capillary Filling:  3sec, Edema:  none   Skin: Texture, Tone and Turgor: Diminished, Digital Hair:  none  Atrophic Changes: moderate   Lesions:  moderate  Ulcers/Wounds:  none  Nail Pathology:  Severely dystrophic elongated nails times 10 consistent with mycosis, brittle nature and 0 5 cm average thickness, moderate subungual debris  Fungal odor noted  Neurologic:  CNII-XII intact  Normal strength  Sensation and reflexes: The epicritic sensations grossly intact  Lab Results:   CBC w/diff  Results from last 7 days   Lab Units 11/13/19  0440   WBC Thousand/uL 11 31*   HEMOGLOBIN g/dL 13 8   HEMATOCRIT % 42 8   PLATELETS Thousands/uL 305   NEUTROS PCT % 75   LYMPHS PCT % 17   MONOS PCT % 8   EOS PCT % 0     BMP  Results from last 7 days   Lab Units 11/13/19  0440   POTASSIUM mmol/L 4 3   CHLORIDE mmol/L 101   CO2 mmol/L 29   BUN mg/dL 15   CREATININE mg/dL 0 70   CALCIUM mg/dL 9 8     CMP  Results from last 7 days   Lab Units 11/13/19  0440 11/12/19  1953   POTASSIUM mmol/L 4 3 3 9   CHLORIDE mmol/L 101 101   CO2 mmol/L 29 31   BUN mg/dL 15 11   CREATININE mg/dL 0 70 0 61   CALCIUM mg/dL 9 8 8 7   ALK PHOS U/L  --  60   ALT U/L  --  17   AST U/L  --  21     @Culture@  No results found for: Leydi Ax      Imaging: I have personally reviewed pertinent films in PACS      EKG, Pathology, and Other Studies: I have personally reviewed pertinent reports        Code Status: Level 1 - Full Code        Purnima Vincent DPM

## 2019-11-15 NOTE — DISCHARGE SUMMARY
Discharge Summary - Rand Daley 80 y o  female MRN: [id number]    Unit/Bed#: [address] [facility] 207-01 Encounter: [id number]    Admission Date:   Admission Orders (From admission, onward)     Ordered        11/13/19 1232  Inpatient Admission  Once         11/12/19 2233  Place in Observation  Once                     Admitting Diagnosis: Weakness [R53 1]  Unsteady gait [R26 81]  Hypoxia [R09 02]    HPI: Rand Daley is a 80 y o  female who medical history of AFib in osteoarthritis patient takes mostly blood pressure medication and Lopressor, patient's bedroom is upstairs and family is unable to get her up the steps anymore ambulatory dysfunction severe  Per daughter mild dementia but appears to be more severe  Procedures Performed: No orders of the defined types were placed in this encounter  Summary of Hospital Course:     Generalized weakness  Ambulatory dysfunction     Patient presents emergency room with increasing weakness accompanied by her daughter  She appeared to have significant dementia  Initially was recommended the patient be admitted for possible placement to a skilled nursing facility versus nursing home  However the patient's mental status improved when the daughter arrives  After discussion with the family, they elected to take the patient home as opposed to a nursing home placement given familiar environment and reduced risk of delirium  Home care will be ordered on discharge  Significant Findings, Care, Treatment and Services Provided: none    Complications: none    Discharge Diagnosis: see above    Resolved Problems  Date Reviewed: 11/14/2019    None          Condition at Discharge: fair         Discharge instructions/Information to patient and family:   See after visit summary for information provided to patient and family  Provisions for Follow-Up Care:  See after visit summary for information related to follow-up care and any pertinent home health orders  PCP: Sam Crum MD    Disposition: Home with healthcare    Planned Readmission: No      Discharge Statement   I spent 22 minutes discharging the patient  This time was spent on the day of discharge  I had direct contact with the patient on the day of discharge  Additional documentation is required if more than 30 minutes were spent on discharge  Discharge Medications:  See after visit summary for reconciled discharge medications provided to patient and family

## 2019-11-15 NOTE — NURSING NOTE
Due to patient increased lethargy after last dose of Melatonin, patients daughter refused this evenings dose

## 2019-11-15 NOTE — PLAN OF CARE
Problem: PAIN - ADULT  Goal: Verbalizes/displays adequate comfort level or baseline comfort level  Description  Interventions:  - Encourage patient to monitor pain and request assistance  - Assess pain using appropriate pain scale  - Administer analgesics based on type and severity of pain and evaluate response  - Implement non-pharmacological measures as appropriate and evaluate response  - Consider cultural and social influences on pain and pain management  - Notify physician/advanced practitioner if interventions unsuccessful or patient reports new pain  Outcome: Progressing     Problem: INFECTION - ADULT  Goal: Absence or prevention of progression during hospitalization  Description  INTERVENTIONS:  - Assess and monitor for signs and symptoms of infection  - Monitor lab/diagnostic results  - Monitor all insertion sites, i e  indwelling lines, tubes, and drains  - Monitor endotracheal if appropriate and nasal secretions for changes in amount and color  - [place] appropriate cooling/warming therapies per order  - Administer medications as ordered  - Instruct and encourage patient and family to use good hand hygiene technique  - Identify and instruct in appropriate isolation precautions for identified infection/condition  Outcome: Progressing  Goal: Absence of fever/infection during neutropenic period  Description  INTERVENTIONS:  - Monitor WBC    Outcome: Progressing     Problem: SAFETY ADULT  Goal: Patient will remain free of falls  Description  INTERVENTIONS:  - Assess patient frequently for physical needs  -  Identify cognitive and physical deficits and behaviors that affect risk of falls    -  [place] fall precautions as indicated by assessment   - Educate patient/family on patient safety including physical limitations  - Instruct patient to call for assistance with activity based on assessment  - Modify environment to reduce risk of injury  - Consider OT/PT consult to assist with strengthening/mobility  Outcome: Progressing  Goal: Maintain or return to baseline ADL function  Description  INTERVENTIONS:  -  Assess patient's ability to carry out ADLs; assess patient's baseline for ADL function and identify physical deficits which impact ability to perform ADLs (bathing, care of mouth/teeth, toileting, grooming, dressing, etc )  - Assess/evaluate cause of self-care deficits   - Assess range of motion  - Assess patient's mobility; develop plan if impaired  - Assess patient's need for assistive devices and provide as appropriate  - Encourage maximum independence but intervene and supervise when necessary  - Involve family in performance of ADLs  - Assess for home care needs following discharge   - Consider OT consult to assist with ADL evaluation and planning for discharge  - Provide patient education as appropriate  Outcome: Progressing  Goal: Maintain or return mobility status to optimal level  Description  INTERVENTIONS:  - Assess patient's baseline mobility status (ambulation, transfers, stairs, etc )    - Identify cognitive and physical deficits and behaviors that affect mobility  - Identify mobility aids required to assist with transfers and/or ambulation (gait belt, sit-to-stand, lift, walker, cane, etc )  - [place] fall precautions as indicated by assessment  - Record patient progress and toleration of activity level on Mobility SBAR; progress patient to next Phase/Stage  - Instruct patient to call for assistance with activity based on assessment  - Consider rehabilitation consult to assist with strengthening/weightbearing, etc   Outcome: Progressing     Problem: DISCHARGE PLANNING  Goal: Discharge to home or other facility with appropriate resources  Description  INTERVENTIONS:  - Identify barriers to discharge w/patient and caregiver  - Arrange for needed discharge resources and transportation as appropriate  - Identify discharge learning needs (meds, wound care, etc )  - Arrange for interpretive services to assist at discharge as needed  - Refer to Case Management Department for coordinating discharge planning if the patient needs post-hospital services based on physician/advanced practitioner order or complex needs related to functional status, cognitive ability, or social support system  Outcome: Progressing     Problem: Knowledge Deficit  Goal: Patient/family/caregiver demonstrates understanding of disease process, treatment plan, medications, and discharge instructions  Description  Complete learning assessment and assess knowledge base  Interventions:  - Provide teaching at level of understanding  - Provide teaching via preferred learning methods  Outcome: Progressing     Problem: Prexisting or High Potential for Compromised Skin Integrity  Goal: Skin integrity is maintained or improved  Description  INTERVENTIONS:  - Identify patients at risk for skin breakdown  - Assess and monitor skin integrity  - Assess and monitor nutrition and hydration status  - Monitor labs   - Assess for incontinence   - Turn and reposition patient  - Assist with mobility/ambulation  - Relieve pressure over bony prominences  - Avoid friction and shearing  - Provide appropriate hygiene as needed including keeping skin clean and dry  - Evaluate need for skin moisturizer/barrier cream  - Collaborate with interdisciplinary team   - Patient/family teaching  - Consider wound care consult   Outcome: Progressing     Problem: Potential for Falls  Goal: Patient will remain free of falls  Description  INTERVENTIONS:  - Assess patient frequently for physical needs  -  Identify cognitive and physical deficits and behaviors that affect risk of falls    -  [place] fall precautions as indicated by assessment   - Educate patient/family on patient safety including physical limitations  - Instruct patient to call for assistance with activity based on assessment  - Modify environment to reduce risk of injury  - Consider OT/PT consult to assist with strengthening/mobility  Outcome: Progressing

## 2019-11-16 ENCOUNTER — TELEPHONE (OUTPATIENT)
Dept: OTHER | Facility: OTHER | Age: 84
End: 2019-11-16

## 2019-11-17 ENCOUNTER — HOSPITAL ENCOUNTER (INPATIENT)
Facility: HOSPITAL | Age: 84
LOS: 3 days | Discharge: NURSING, REHAB OR LONG-TERM CARE | DRG: 871 | End: 2019-11-20
Attending: EMERGENCY MEDICINE | Admitting: INTERNAL MEDICINE
Payer: MEDICARE

## 2019-11-17 ENCOUNTER — APPOINTMENT (EMERGENCY)
Dept: RADIOLOGY | Facility: HOSPITAL | Age: 84
DRG: 871 | End: 2019-11-17
Payer: MEDICARE

## 2019-11-17 DIAGNOSIS — J18.9 HEALTHCARE-ASSOCIATED PNEUMONIA: Primary | ICD-10-CM

## 2019-11-17 DIAGNOSIS — R09.02 HYPOXIA: ICD-10-CM

## 2019-11-17 PROBLEM — G93.41 ENCEPHALOPATHY, METABOLIC: Status: ACTIVE | Noted: 2019-11-17

## 2019-11-17 PROBLEM — E83.52 HYPERCALCEMIA: Status: ACTIVE | Noted: 2019-11-17

## 2019-11-17 PROBLEM — A41.9 SEPSIS (HCC): Status: ACTIVE | Noted: 2019-11-17

## 2019-11-17 PROBLEM — E87.1 HYPONATREMIA: Status: ACTIVE | Noted: 2019-11-17

## 2019-11-17 LAB
ALBUMIN SERPL BCP-MCNC: 2.8 G/DL (ref 3.5–5)
ALP SERPL-CCNC: 66 U/L (ref 46–116)
ALT SERPL W P-5'-P-CCNC: 23 U/L (ref 12–78)
ANION GAP SERPL CALCULATED.3IONS-SCNC: 3 MMOL/L (ref 4–13)
APTT PPP: 41 SECONDS (ref 23–37)
AST SERPL W P-5'-P-CCNC: 26 U/L (ref 5–45)
ATRIAL RATE: 110 BPM
BACTERIA UR QL AUTO: ABNORMAL /HPF
BASOPHILS # BLD AUTO: 0.05 THOUSANDS/ΜL (ref 0–0.1)
BASOPHILS NFR BLD AUTO: 0 % (ref 0–1)
BILIRUB SERPL-MCNC: 1 MG/DL (ref 0.2–1)
BILIRUB UR QL STRIP: NEGATIVE
BUN SERPL-MCNC: 19 MG/DL (ref 5–25)
CALCIUM SERPL-MCNC: 10.6 MG/DL (ref 8.3–10.1)
CHLORIDE SERPL-SCNC: 99 MMOL/L (ref 100–108)
CLARITY UR: CLEAR
CO2 SERPL-SCNC: 32 MMOL/L (ref 21–32)
COLOR UR: ABNORMAL
CREAT SERPL-MCNC: 0.97 MG/DL (ref 0.6–1.3)
EOSINOPHIL # BLD AUTO: 0.01 THOUSAND/ΜL (ref 0–0.61)
EOSINOPHIL NFR BLD AUTO: 0 % (ref 0–6)
ERYTHROCYTE [DISTWIDTH] IN BLOOD BY AUTOMATED COUNT: 11.9 % (ref 11.6–15.1)
GFR SERPL CREATININE-BSD FRML MDRD: 49 ML/MIN/1.73SQ M
GLUCOSE SERPL-MCNC: 124 MG/DL (ref 65–140)
GLUCOSE UR STRIP-MCNC: NEGATIVE MG/DL
HCT VFR BLD AUTO: 44.5 % (ref 34.8–46.1)
HGB BLD-MCNC: 14.4 G/DL (ref 11.5–15.4)
HGB UR QL STRIP.AUTO: ABNORMAL
HYALINE CASTS #/AREA URNS LPF: ABNORMAL /LPF
IMM GRANULOCYTES # BLD AUTO: 0.12 THOUSAND/UL (ref 0–0.2)
IMM GRANULOCYTES NFR BLD AUTO: 1 % (ref 0–2)
INR PPP: 1.2 (ref 0.84–1.19)
KETONES UR STRIP-MCNC: NEGATIVE MG/DL
LACTATE SERPL-SCNC: 1.5 MMOL/L (ref 0.5–2)
LEUKOCYTE ESTERASE UR QL STRIP: NEGATIVE
LYMPHOCYTES # BLD AUTO: 1.69 THOUSANDS/ΜL (ref 0.6–4.47)
LYMPHOCYTES NFR BLD AUTO: 8 % (ref 14–44)
MCH RBC QN AUTO: 32.4 PG (ref 26.8–34.3)
MCHC RBC AUTO-ENTMCNC: 32.4 G/DL (ref 31.4–37.4)
MCV RBC AUTO: 100 FL (ref 82–98)
MONOCYTES # BLD AUTO: 1.78 THOUSAND/ΜL (ref 0.17–1.22)
MONOCYTES NFR BLD AUTO: 9 % (ref 4–12)
MUCOUS THREADS UR QL AUTO: ABNORMAL
NEUTROPHILS # BLD AUTO: 16.91 THOUSANDS/ΜL (ref 1.85–7.62)
NEUTS SEG NFR BLD AUTO: 82 % (ref 43–75)
NITRITE UR QL STRIP: NEGATIVE
NON-SQ EPI CELLS URNS QL MICRO: ABNORMAL /HPF
NRBC BLD AUTO-RTO: 0 /100 WBCS
NT-PROBNP SERPL-MCNC: 1945 PG/ML
P AXIS: 84 DEGREES
PH UR STRIP.AUTO: 5 [PH]
PLATELET # BLD AUTO: 251 THOUSANDS/UL (ref 149–390)
PLATELET # BLD AUTO: 261 THOUSANDS/UL (ref 149–390)
PMV BLD AUTO: 9.5 FL (ref 8.9–12.7)
PMV BLD AUTO: 9.9 FL (ref 8.9–12.7)
POTASSIUM SERPL-SCNC: 3.7 MMOL/L (ref 3.5–5.3)
PR INTERVAL: 180 MS
PROT SERPL-MCNC: 7.1 G/DL (ref 6.4–8.2)
PROT UR STRIP-MCNC: ABNORMAL MG/DL
PROTHROMBIN TIME: 14.9 SECONDS (ref 11.6–14.5)
QRS AXIS: 23 DEGREES
QRSD INTERVAL: 64 MS
QT INTERVAL: 328 MS
QTC INTERVAL: 443 MS
RBC # BLD AUTO: 4.45 MILLION/UL (ref 3.81–5.12)
RBC #/AREA URNS AUTO: ABNORMAL /HPF
SODIUM SERPL-SCNC: 134 MMOL/L (ref 136–145)
SP GR UR STRIP.AUTO: >=1.03 (ref 1–1.03)
T WAVE AXIS: 81 DEGREES
TROPONIN I SERPL-MCNC: <0.02 NG/ML
UROBILINOGEN UR QL STRIP.AUTO: 0.2 E.U./DL
VENTRICULAR RATE: 110 BPM
WBC # BLD AUTO: 20.56 THOUSAND/UL (ref 4.31–10.16)
WBC #/AREA URNS AUTO: ABNORMAL /HPF

## 2019-11-17 PROCEDURE — 94664 DEMO&/EVAL PT USE INHALER: CPT

## 2019-11-17 PROCEDURE — 87205 SMEAR GRAM STAIN: CPT | Performed by: INTERNAL MEDICINE

## 2019-11-17 PROCEDURE — 87070 CULTURE OTHR SPECIMN AEROBIC: CPT | Performed by: INTERNAL MEDICINE

## 2019-11-17 PROCEDURE — 1124F ACP DISCUSS-NO DSCNMKR DOCD: CPT | Performed by: INTERNAL MEDICINE

## 2019-11-17 PROCEDURE — 87081 CULTURE SCREEN ONLY: CPT | Performed by: INTERNAL MEDICINE

## 2019-11-17 PROCEDURE — 36415 COLL VENOUS BLD VENIPUNCTURE: CPT

## 2019-11-17 PROCEDURE — 85610 PROTHROMBIN TIME: CPT

## 2019-11-17 PROCEDURE — 99223 1ST HOSP IP/OBS HIGH 75: CPT | Performed by: INTERNAL MEDICINE

## 2019-11-17 PROCEDURE — 96365 THER/PROPH/DIAG IV INF INIT: CPT

## 2019-11-17 PROCEDURE — 85730 THROMBOPLASTIN TIME PARTIAL: CPT

## 2019-11-17 PROCEDURE — 83605 ASSAY OF LACTIC ACID: CPT

## 2019-11-17 PROCEDURE — 71046 X-RAY EXAM CHEST 2 VIEWS: CPT

## 2019-11-17 PROCEDURE — 84484 ASSAY OF TROPONIN QUANT: CPT | Performed by: PHYSICIAN ASSISTANT

## 2019-11-17 PROCEDURE — 81001 URINALYSIS AUTO W/SCOPE: CPT | Performed by: INTERNAL MEDICINE

## 2019-11-17 PROCEDURE — 99285 EMERGENCY DEPT VISIT HI MDM: CPT | Performed by: PHYSICIAN ASSISTANT

## 2019-11-17 PROCEDURE — 87449 NOS EACH ORGANISM AG IA: CPT | Performed by: INTERNAL MEDICINE

## 2019-11-17 PROCEDURE — 80053 COMPREHEN METABOLIC PANEL: CPT

## 2019-11-17 PROCEDURE — 93010 ELECTROCARDIOGRAM REPORT: CPT | Performed by: INTERNAL MEDICINE

## 2019-11-17 PROCEDURE — 94760 N-INVAS EAR/PLS OXIMETRY 1: CPT

## 2019-11-17 PROCEDURE — 83880 ASSAY OF NATRIURETIC PEPTIDE: CPT | Performed by: PHYSICIAN ASSISTANT

## 2019-11-17 PROCEDURE — 93005 ELECTROCARDIOGRAM TRACING: CPT

## 2019-11-17 PROCEDURE — 99285 EMERGENCY DEPT VISIT HI MDM: CPT

## 2019-11-17 PROCEDURE — 85049 AUTOMATED PLATELET COUNT: CPT | Performed by: INTERNAL MEDICINE

## 2019-11-17 PROCEDURE — 87040 BLOOD CULTURE FOR BACTERIA: CPT

## 2019-11-17 PROCEDURE — 85025 COMPLETE CBC W/AUTO DIFF WBC: CPT

## 2019-11-17 RX ORDER — LOSARTAN POTASSIUM 50 MG/1
100 TABLET ORAL DAILY
Status: DISCONTINUED | OUTPATIENT
Start: 2019-11-18 | End: 2019-11-20 | Stop reason: HOSPADM

## 2019-11-17 RX ORDER — IPRATROPIUM BROMIDE AND ALBUTEROL SULFATE 2.5; .5 MG/3ML; MG/3ML
3 SOLUTION RESPIRATORY (INHALATION) ONCE
Status: COMPLETED | OUTPATIENT
Start: 2019-11-17 | End: 2019-11-17

## 2019-11-17 RX ORDER — SODIUM CHLORIDE 9 MG/ML
75 INJECTION, SOLUTION INTRAVENOUS CONTINUOUS
Status: DISPENSED | OUTPATIENT
Start: 2019-11-17 | End: 2019-11-17

## 2019-11-17 RX ORDER — IPRATROPIUM BROMIDE AND ALBUTEROL SULFATE .5; 3 MG/3ML; MG/3ML
1 SOLUTION RESPIRATORY (INHALATION) ONCE
Status: COMPLETED | OUTPATIENT
Start: 2019-11-17 | End: 2019-11-17

## 2019-11-17 RX ORDER — METOPROLOL SUCCINATE 50 MG/1
50 TABLET, EXTENDED RELEASE ORAL DAILY
Status: DISCONTINUED | OUTPATIENT
Start: 2019-11-18 | End: 2019-11-20 | Stop reason: HOSPADM

## 2019-11-17 RX ORDER — SODIUM CHLORIDE 9 MG/ML
125 INJECTION, SOLUTION INTRAVENOUS CONTINUOUS
Status: DISCONTINUED | OUTPATIENT
Start: 2019-11-17 | End: 2019-11-17

## 2019-11-17 RX ORDER — ONDANSETRON 2 MG/ML
4 INJECTION INTRAMUSCULAR; INTRAVENOUS EVERY 6 HOURS PRN
Status: DISCONTINUED | OUTPATIENT
Start: 2019-11-17 | End: 2019-11-20 | Stop reason: HOSPADM

## 2019-11-17 RX ORDER — ACETAMINOPHEN 325 MG/1
650 TABLET ORAL EVERY 6 HOURS PRN
Status: DISCONTINUED | OUTPATIENT
Start: 2019-11-17 | End: 2019-11-20 | Stop reason: HOSPADM

## 2019-11-17 RX ORDER — BRINZOLAMIDE 10 MG/ML
1 SUSPENSION/ DROPS OPHTHALMIC 3 TIMES DAILY
Status: DISCONTINUED | OUTPATIENT
Start: 2019-11-17 | End: 2019-11-20 | Stop reason: HOSPADM

## 2019-11-17 RX ORDER — ALBUTEROL SULFATE 2.5 MG/3ML
2.5 SOLUTION RESPIRATORY (INHALATION) EVERY 4 HOURS PRN
Status: DISCONTINUED | OUTPATIENT
Start: 2019-11-17 | End: 2019-11-20 | Stop reason: HOSPADM

## 2019-11-17 RX ORDER — PAROXETINE HYDROCHLORIDE 20 MG/1
20 TABLET, FILM COATED ORAL DAILY
Status: DISCONTINUED | OUTPATIENT
Start: 2019-11-17 | End: 2019-11-20 | Stop reason: HOSPADM

## 2019-11-17 RX ADMIN — CEFEPIME HYDROCHLORIDE 2000 MG: 2 INJECTION, SOLUTION INTRAVENOUS at 13:24

## 2019-11-17 RX ADMIN — BRINZOLAMIDE 1 DROP: 10 SUSPENSION/ DROPS OPHTHALMIC at 22:14

## 2019-11-17 RX ADMIN — IPRATROPIUM BROMIDE AND ALBUTEROL SULFATE 3 ML: 2.5; .5 SOLUTION RESPIRATORY (INHALATION) at 13:28

## 2019-11-17 RX ADMIN — BRINZOLAMIDE 1 DROP: 10 SUSPENSION/ DROPS OPHTHALMIC at 17:32

## 2019-11-17 RX ADMIN — SODIUM CHLORIDE 75 ML/HR: 0.9 INJECTION, SOLUTION INTRAVENOUS at 15:45

## 2019-11-17 RX ADMIN — VANCOMYCIN HYDROCHLORIDE 750 MG: 750 INJECTION, SOLUTION INTRAVENOUS at 14:10

## 2019-11-17 RX ADMIN — PAROXETINE 20 MG: 20 TABLET, FILM COATED ORAL at 17:25

## 2019-11-17 RX ADMIN — SODIUM CHLORIDE 125 ML/HR: 0.9 INJECTION, SOLUTION INTRAVENOUS at 13:24

## 2019-11-17 RX ADMIN — ENOXAPARIN SODIUM 30 MG: 30 INJECTION SUBCUTANEOUS at 17:26

## 2019-11-17 NOTE — TELEPHONE ENCOUNTER
Shawnee Daley 6/10/1921  CONFIDENTIALTY NOTICE: This fax transmission is intended only for the addressee  It contains information that is legally privileged,  confidential or otherwise protected from use or disclosure  If you are not the intended recipient, you are strictly prohibited from reviewing,  disclosing, copying using or disseminating any of this information or taking any action in reliance on or regarding this information  If you have  received this fax in error, please notify us immediately by telephone so that we can arrange for its return to us  Page:  2  Call Id: [id number]  Health Call  Standard Call Report  Health Call  Patient Name: Beatriz Caceres  Gender: Female  : 6/10/1921  Age: 80 Y 5 M 6 D  Return Phone  Number: [phone] (Current)  Address: [address] [place] [address]/[facility]/Zip: [facility]  Practice Name: [facility]  Practice Charged:  Physician:  [facility] Name: Marcellus He  Relationship To  Patient: Daughter  Return Phone Number: [phone] (Current)  Presenting Problem: "My mother's blood pressure is high "  Service Type: Triage  Charged Service 1: Kajal Luque U  38  Name and  Number:  Nurse Assessment  Nurse: Jalyn Tate RN Date/Time: 2019 7:19:11 PM  Type of assessment required:  ---General (Adult or Child)  Duration of Current S/S  ---Unknown time period  Location/Radiation  ---Circulatory  Temperature (F) and route:  ---N/A  Symptom Specific Meds (Dose/Time): The daughter reports they were given late  She found the whole pills in linens after she  had given her mother's medications initially @ 1700  Gave them @ 1900 when found  not having been swallowed  ---Losartan 100 mg and Metoprolol XL 50 mg both given @ about 1900  Other S/S  Visiting nurse starts to see the patient in the am   ---The patient has no weakness of her limbs, no change in speech or breathing  No chest  pain    Pain Scale on scale of 1-10, 10 being the worst:  ---Daughter denies her mother states she has any pain  Symptom progression:  Shawnee Daley 6/10/1921  CONFIDENTIALTY NOTICE: This fax transmission is intended only for the addressee  It contains information that is legally privileged,  confidential or otherwise protected from use or disclosure  If you are not the intended recipient, you are strictly prohibited from reviewing,  disclosing, copying using or disseminating any of this information or taking any action in reliance on or regarding this information  If you have  received this fax in error, please notify us immediately by telephone so that we can arrange for its return to us  Page: 2 of 2  Call Id: [id number]  Nurse Assessment  ---same  Intake and Output  ---Not reviewed  Last Exam/Treatment:  ---Was discharged from hospital yesterday for blood pressure concerns  Protocols  Protocol Title Nurse Date/Time  High Blood Pressure Inez Mena 11/16/2019 7:20:31 PM  Question Caller Affirmed  Disp  Time Disposition Final User  11/16/2019 7:31:04 PM RN Triaged Melinda Lux RN, Sarah  11/16/2019 7:33:45 PM See PCP When Office is Open (within 3  days)  Yes Melinda Lux RN, Ki 57 Advice Given Per Protocol  SEE PCP WITHIN 3 DAYS: * You need to be seen within 2 or 3 days  Call your doctor during regular office hours and make an  appointment  An urgent care center is often the best source of care if your doctor's office is closed or you can't get an appointment  NOTE: If office will be open tomorrow, tell caller to call then, not in 3 days  HIGH BLOOD PRESSURE: * Untreated high blood  pressure may cause damage to your heart, brain, kidneys, and eyes  * Treatment of high blood pressure can reduce the risk of stroke,  heart attack, and heart failure  * The goal of blood pressure treatment for most people with hypertension is to keep the blood pressure  under 140/90   For people that are 60 years or older, your doctor may instead want to keep the blood pressure under 150/90  LIFESTYLE  MODIFICATIONS - The following things can help you reduce your blood pressure: * EAT HEALTHY: Eat a diet rich in fresh fruits  and vegetables, dietary fiber, non-animal protein (e g , soy), and low-fat dairy products  Avoid foods with a high content of saturated  fat or cholesterol  * DECREASE SODIUM INTAKE: Aim to eat less than 2 4 g (100 mmol) of sodium each day  Unfortunately 75%  of the salt in the average person's diet is in pre-processed foods  * LIMIT ALCOHOL: Limit alcohol to 0-2 standard drinks each day  Men should have less than 14 dinks per week  Women should have less than 9 drinks per week  A drink is 1 5 oz hard liquor (one shot  or jigger; 45 ml), 5 oz wine (small glass; 150 ml), 12 oz beer (one can; 360 ml)  * EXERCISE, BE MORE PHYSICALLY ACTIVE:  Do at least 30 minutes of aerobic exercise (e g , brisk walking) most days of the week  Other examples of aerobic activities cycling,  jogging, and swimming  * REDUCE WEIGHT AND WAIST LINE: It is important to maintain a normal body weight  The goal  should be a BMI (body mass index) under 25 for men and women, a waist circumference under 40 inches (102 cm) in men, and a waist  circumference under 35 inches (88 cm) in women  * REDUCE STRESS: Find activities that help reduce your stress  Examples might  include meditation, yoga, or even a restful walk in a park  CALL BACK IF: * Weakness or numbness of the face, arm or leg on one side  of the body occurs * Difficulty walking, difficulty talking, or severe headache occurs * Chest pain or difficulty breathing occurs * Your  blood pressure is over 180/110 * You become worse  CARE ADVICE given per High Blood Pressure (Adult) guideline    Caller Understands: Yes  Caller Disagree/Comply: Comply  PreDisposition: Unsure

## 2019-11-17 NOTE — ASSESSMENT & PLAN NOTE
Patient has a new onset cough with purulent sputum production and a new left lower lobe infiltrate on chest x-ray that I personally reviewed  She most likely has left-sided pneumonia  Since patient was in the hospital 2 days ago I will cover for possible Gram-negative pneumonia with IV cefepime and IV vancomycin until MRSA culture is back  Daughter reports no symptoms of aspiration, problems with swallowing but will ask speech therapy to see her tomorrow to evaluate for silent aspiration  I will hydrate patient with normal saline solution  She will require more than 2 midnights hospital stay      She is level 3 DNR according to family's request

## 2019-11-17 NOTE — ED NOTES
Patient brief changed, patient incontinent of urine  Katherine care performed, new brief placed on patient        Wilian Dietz RN  11/17/19 3332

## 2019-11-17 NOTE — ASSESSMENT & PLAN NOTE
Patient was confused this morning not recognizing family members    She has acute metabolic encephalopathy due to pneumonia, sepsis

## 2019-11-17 NOTE — ASSESSMENT & PLAN NOTE
Patient has mild hyponatremia:  Sodium is 134   Will hydrate her with normal saline solution and will follow sodium levels

## 2019-11-17 NOTE — H&P
H&P- Radha Forrest Dornsife 6/10/1921, 80 y o  female MRN: [id number]    Unit/Bed#: [address] [place] [address] Encounter: [id number]    Primary Care Provider: Hermelinda King MD   Date and time admitted to hospital: 11/17/2019 12:16 PM        * Pneumonia  Assessment & Plan  Patient has a new onset cough with purulent sputum production and a new left lower lobe infiltrate on chest x-ray that I personally reviewed  She most likely has left-sided pneumonia  Since patient was in the hospital 2 days ago I will cover for possible Gram-negative pneumonia with IV cefepime and IV vancomycin until MRSA culture is back  Daughter reports no symptoms of aspiration, problems with swallowing but will ask speech therapy to see her tomorrow to evaluate for silent aspiration  I will hydrate patient with normal saline solution  She will require more than 2 midnights hospital stay  She is level 3 DNR according to family's request    Sepsis [facility])  Assessment & Plan  Patient meets criteria for sepsis as evidenced by heart of 106, respiratory rate of 32, leukocyte count of 20,000 with left shift  Lactic acid level is normal   Sepsis most likely due to pneumonia but since patient has new onset urine incontinence will get UA to rule out UTI as well    Encephalopathy, metabolic  Assessment & Plan  Patient was confused this morning not recognizing family members  She has acute metabolic encephalopathy due to pneumonia, sepsis    Essential hypertension  Assessment & Plan  Patient's blood pressure was uncontrolled on presentation to the ER with systolic blood pressure of more than 180 which has improved since then to systolic blood pressures in the 140s  Will continue losartan and metoprolol    Mild intermittent asthma without complication  Assessment & Plan  Patient has mild expiratory wheezing on exam   I placed her on DuoNeb nebulizers and Pulmicort nebulizers  I am not starting IV steroids yet      Hyponatremia  Assessment & Plan  Patient has mild hyponatremia:  Sodium is 134  Will hydrate her with normal saline solution and will follow sodium levels    Hypercalcemia  Assessment & Plan  Patient's calcium was normal earlier this week  Her corrected calcium level is 11 9  Will hydrate her with normal saline solution  Will follow calcium levels with serial blood tests      VTE Prophylaxis: ordered    Code Status: as above    Anticipated Length of Stay: as above    Justification for Hospital Stay: see assessment and plan        Chief Complaint:   Weakness, Confusion and cough    History of Present Illness:    Rhonda Daley is a 80 y o  female who presents with above chief compaint  Patient was admitted to this hospital on November 12th with weakness of the legs and gait dysfunction  During the hospital stay which showed no evidence of UTI or pneumonia patient regained her strength and was just to be stable to be discharged home with family  Patient was discharged 2 days ago  Patient was discharged home and again last night she could not support her weight and her knees buckled but did not fall  Daughter noticed that patient was getting confused again  She developed a cough with sore throat and yellowish sputum production last night that persisted today  This morning patient again was too weak to support her weight and could not stand up  Daughter was concerned about patient's persistent confusion and brought her to the ER for evaluation  Daughter reports that patient was incontinent of urine at home which is unusual for her  She had no coughing or choking when swallowing, she had no vomiting or diarrhea  On the contrary, she was constipated  Daughter also noticed that patient would breathe rapidly today  Patient's leukocyte count on November 13th was 1 31  Urinalysis last admission showed no UTI and chest x-ray showed no pneumonia  Patient has hypertension and asthma in the history    Daughter noted more expiratory wheezing today  Patient is hard of hearing, she is a poor historian  She denies being in pain, having nausea currently  She does not recall what happened since return home  Patient was afebrile on presentation to the ER but was tachycardic with sinus tachycardia on the monitor with heart rate as fast as 111  She was tachypneic with respiratory of 28 and had hypertension with blood pressure 187/89  Her oxygen saturation on room air was 99%  Leukocyte count was 20,000 with left shift and patient had no lactic acidosis  Review of Systems:    Review of Systems   Constitutional: Positive for fatigue  Negative for fever  HENT: Negative for congestion  Eyes: Negative for visual disturbance  Respiratory: Positive for cough and shortness of breath  Cardiovascular: Negative for chest pain, palpitations and leg swelling  Gastrointestinal: Negative for abdominal pain, blood in stool and diarrhea  Occasional bright red blood per rectum   Endocrine: Negative for polydipsia and polyphagia  Genitourinary:        New urinary incontinence since hospital discharge   Musculoskeletal: Negative for joint swelling, myalgias and neck stiffness  Skin: Negative for rash  Neurological: Negative for numbness and headaches  Confusion last night and this morning   Hematological: Negative for adenopathy  Psychiatric/Behavioral: Negative for dysphoric mood  All other systems reviewed and are negative  Past Medical and Surgical History:     Past Medical History:   Diagnosis Date    A-fib [facility])     Osteoarthritis        History reviewed  No pertinent surgical history  Meds/Allergies:    Prior to Admission Medications   Prescriptions Last Dose Informant Patient Reported? Taking?    PARoxetine (PAXIL) 30 mg tablet 11/17/2019 at Unknown time  No Yes   Sig: TAKE 1 TABLET EVERY MORNING   albuterol (PROAIR HFA) 90 mcg/act inhaler 11/17/2019 at Unknown time  No Yes   Sig: Inhale 2 puffs every 4 (four) hours as needed for wheezing or shortness of breath   brinzolamide (AZOPT) 1 % ophthalmic suspension 11/17/2019 at Unknown time Self Yes Yes   Sig: Apply to eye daily at bedtime    losartan (COZAAR) 100 MG tablet 11/17/2019 at Unknown time  No Yes   Sig: TAKE 1 TABLET DAILY   metoprolol succinate (TOPROL-XL) 50 mg 24 hr tablet 11/17/2019 at Unknown time  No Yes   Sig: TAKE 1 TABLET DAILY   nystatin (MYCOSTATIN) cream 11/17/2019 at Unknown time Self Yes Yes   Sig: Apply topically 2 (two) times a day   omeprazole (PriLOSEC) 20 mg delayed release capsule 11/17/2019 at Unknown time  No Yes   Sig: TAKE 1 CAPSULE DAILY      Facility-Administered Medications: None       Allergies: Allergies   Allergen Reactions    Sulfa Antibiotics Rash     per t sys    Sulfamethoxazole-Trimethoprim Rash       Social History:     Social History     Substance and Sexual Activity   Alcohol Use No    Frequency: Never    Binge frequency: Never     Social History     Tobacco Use   Smoking Status Former Smoker   Smokeless Tobacco Never Used     Social History     Substance and Sexual Activity   Drug Use No       Family History:    Family History   Problem Relation Age of Onset    No Known Problems Mother     No Known Problems Father        Physical Exam:     Vitals:   Blood Pressure: 138/59 (11/17/19 1444)  Pulse: 89 (11/17/19 1444)  Temperature: 98 1 °F (36 7 °C) (11/17/19 1444)  Temp Source: Oral (11/17/19 1444)  Respirations: 18 (11/17/19 1444)  Height: 5' 4" (162 6 cm) (11/17/19 1444)  Weight - Scale: 53 kg (116 lb 13 5 oz) (11/17/19 1444)  SpO2: 99 % (11/17/19 1444)    Physical Exam   HENT:   Head: Normocephalic  Mouth/Throat: No oropharyngeal exudate  Dry oral membranes   Eyes: Conjunctivae are normal    Neck: Neck supple  No JVD present  Cardiovascular: Normal rate and regular rhythm  Pulmonary/Chest: She has wheezes (Mild expiratory wheezing is present bilaterally)     Patient currently is in respiratory distress on oxygen via nasal cannula in the ER   Musculoskeletal: She exhibits no tenderness  Lymphadenopathy:     She has no cervical adenopathy  Neurological: She is alert  Oriented to person, moves all extremities on command,  strength is normal, there is no facial droop, speech was normal   Skin: Skin is warm and dry  Patient has no lower extremity edema   Psychiatric: She has a normal mood and affect  Additional Data:     Lab Results: I personally reviewed them    Results from last 7 days   Lab Units 11/17/19  1232   WBC Thousand/uL 20 56*   HEMOGLOBIN g/dL 14 4   HEMATOCRIT % 44 5   PLATELETS Thousands/uL 261   NEUTROS PCT % 82*   LYMPHS PCT % 8*   MONOS PCT % 9   EOS PCT % 0     Results from last 7 days   Lab Units 11/17/19  1232   POTASSIUM mmol/L 3 7   CHLORIDE mmol/L 99*   CO2 mmol/L 32   BUN mg/dL 19   CREATININE mg/dL 0 97   CALCIUM mg/dL 10 6*   ALK PHOS U/L 66   ALT U/L 23   AST U/L 26     Results from last 7 days   Lab Units 11/12/19  1952   INR  1 00       Imaging: I personally reviewed them    XR chest 2 views   ED Interpretation by Zoe Metz PA-C (11/17 9198)   LLL pneumonia          EKG :  Sinus rhythm on the monitor      Annelise Murcia MD    ** Please Note: This note has been constructed using a voice recognition system   **

## 2019-11-17 NOTE — ASSESSMENT & PLAN NOTE
Patient's blood pressure was uncontrolled on presentation to the ER with systolic blood pressure of more than 180 which has improved since then to systolic blood pressures in the 140s    Will continue losartan and metoprolol

## 2019-11-17 NOTE — PLAN OF CARE
Problem: Potential for Falls  Goal: Patient will remain free of falls  Description  INTERVENTIONS:  - Assess patient frequently for physical needs  -  Identify cognitive and physical deficits and behaviors that affect risk of falls  -  [place] fall precautions as indicated by assessment   - Educate patient/family on patient safety including physical limitations  - Instruct patient to call for assistance with activity based on assessment  - Modify environment to reduce risk of injury  - Consider OT/PT consult to assist with strengthening/mobility  Outcome: Progressing     Problem: Nutrition/Hydration-ADULT  Goal: Nutrient/Hydration intake appropriate for improving, restoring or maintaining nutritional needs  Description  Monitor and assess patient's nutrition/hydration status for malnutrition  Collaborate with interdisciplinary team and initiate plan and interventions as ordered  Monitor patient's weight and dietary intake as ordered or per policy  Utilize nutrition screening tool and intervene as necessary  Determine patient's food preferences and provide high-protein, high-caloric foods as appropriate       INTERVENTIONS:  - Monitor oral intake, urinary output, labs, and treatment plans  - Assess nutrition and hydration status and recommend course of action  - Evaluate amount of meals eaten  - Assist patient with eating if necessary   - Allow adequate time for meals  - Recommend/ encourage appropriate diets, oral nutritional supplements, and vitamin/mineral supplements  - Order, calculate, and assess calorie counts as needed  - Recommend, monitor, and adjust tube feedings and TPN/PPN based on assessed needs  - Assess need for intravenous fluids  - Provide specific nutrition/hydration education as appropriate  - Include patient/family/caregiver in decisions related to nutrition  Outcome: Progressing     Problem: PAIN - ADULT  Goal: Verbalizes/displays adequate comfort level or baseline comfort level  Description  Interventions:  - Encourage patient to monitor pain and request assistance  - Assess pain using appropriate pain scale  - Administer analgesics based on type and severity of pain and evaluate response  - Implement non-pharmacological measures as appropriate and evaluate response  - Consider cultural and social influences on pain and pain management  - Notify physician/advanced practitioner if interventions unsuccessful or patient reports new pain  Outcome: Progressing     Problem: SAFETY ADULT  Goal: Maintain or return to baseline ADL function  Description  INTERVENTIONS:  -  Assess patient's ability to carry out ADLs; assess patient's baseline for ADL function and identify physical deficits which impact ability to perform ADLs (bathing, care of mouth/teeth, toileting, grooming, dressing, etc )  - Assess/evaluate cause of self-care deficits   - Assess range of motion  - Assess patient's mobility; develop plan if impaired  - Assess patient's need for assistive devices and provide as appropriate  - Encourage maximum independence but intervene and supervise when necessary  - Involve family in performance of ADLs  - Assess for home care needs following discharge   - Consider OT consult to assist with ADL evaluation and planning for discharge  - Provide patient education as appropriate  Outcome: Progressing  Goal: Maintain or return mobility status to optimal level  Description  INTERVENTIONS:  - Assess patient's baseline mobility status (ambulation, transfers, stairs, etc )    - Identify cognitive and physical deficits and behaviors that affect mobility  - Identify mobility aids required to assist with transfers and/or ambulation (gait belt, sit-to-stand, lift, walker, cane, etc )  - [place] fall precautions as indicated by assessment  - Record patient progress and toleration of activity level on Mobility SBAR; progress patient to next Phase/Stage  - Instruct patient to call for assistance with activity based on assessment  - Consider rehabilitation consult to assist with strengthening/weightbearing, etc   Outcome: Progressing     Problem: DISCHARGE PLANNING  Goal: Discharge to home or other facility with appropriate resources  Description  INTERVENTIONS:  - Identify barriers to discharge w/patient and caregiver  - Arrange for needed discharge resources and transportation as appropriate  - Identify discharge learning needs (meds, wound care, etc )  - Arrange for interpretive services to assist at discharge as needed  - Refer to Case Management Department for coordinating discharge planning if the patient needs post-hospital services based on physician/advanced practitioner order or complex needs related to functional status, cognitive ability, or social support system  Outcome: Progressing     Problem: Knowledge Deficit  Goal: Patient/family/caregiver demonstrates understanding of disease process, treatment plan, medications, and discharge instructions  Description  Complete learning assessment and assess knowledge base    Interventions:  - Provide teaching at level of understanding  - Provide teaching via preferred learning methods  Outcome: Progressing     Problem: INFECTION - ADULT  Goal: Absence or prevention of progression during hospitalization  Description  INTERVENTIONS:  - Assess and monitor for signs and symptoms of infection  - Monitor lab/diagnostic results  - Monitor all insertion sites, i e  indwelling lines, tubes, and drains  - Monitor endotracheal if appropriate and nasal secretions for changes in amount and color  - [place] appropriate cooling/warming therapies per order  - Administer medications as ordered  - Instruct and encourage patient and family to use good hand hygiene technique  - Identify and instruct in appropriate isolation precautions for identified infection/condition  Outcome: Progressing     Problem: RESPIRATORY - ADULT  Goal: Achieves optimal ventilation and oxygenation  Description  INTERVENTIONS:  - Assess for changes in respiratory status  - Assess for changes in mentation and behavior  - Position to facilitate oxygenation and minimize respiratory effort  - Oxygen administered by appropriate delivery if ordered  - Initiate smoking cessation education as indicated  - Encourage broncho-pulmonary hygiene including cough, deep breathe, Incentive Spirometry  - Assess the need for suctioning and aspirate as needed  - Assess and instruct to report SOB or any respiratory difficulty  - Respiratory Therapy support as indicated  Outcome: Progressing     Problem: MUSCULOSKELETAL - ADULT  Goal: Maintain or return mobility to safest level of function  Description  INTERVENTIONS:  - Assess patient's ability to carry out ADLs; assess patient's baseline for ADL function and identify physical deficits which impact ability to perform ADLs (bathing, care of mouth/teeth, toileting, grooming, dressing, etc )  - Assess/evaluate cause of self-care deficits   - Assess range of motion  - Assess patient's mobility  - Assess patient's need for assistive devices and provide as appropriate  - Encourage maximum independence but intervene and supervise when necessary  - Involve family in performance of ADLs  - Assess for home care needs following discharge   - Consider OT consult to assist with ADL evaluation and planning for discharge  - Provide patient education as appropriate  Outcome: Progressing  Goal: Maintain proper alignment of affected body part  Description  INTERVENTIONS:  - Support, maintain and protect limb and body alignment  - Provide patient/ family with appropriate education  Outcome: Progressing

## 2019-11-17 NOTE — PLAN OF CARE
Problem: Potential for Falls  Goal: Patient will remain free of falls  Description  INTERVENTIONS:  - Assess patient frequently for physical needs  -  Identify cognitive and physical deficits and behaviors that affect risk of falls  -  [place] fall precautions as indicated by assessment   - Educate patient/family on patient safety including physical limitations  - Instruct patient to call for assistance with activity based on assessment  - Modify environment to reduce risk of injury  - Consider OT/PT consult to assist with strengthening/mobility  11/17/2019 1824 by Leandra Roy RN  Outcome: Progressing  11/17/2019 1632 by Leandra Roy RN  Outcome: Progressing     Problem: Nutrition/Hydration-ADULT  Goal: Nutrient/Hydration intake appropriate for improving, restoring or maintaining nutritional needs  Description  Monitor and assess patient's nutrition/hydration status for malnutrition  Collaborate with interdisciplinary team and initiate plan and interventions as ordered  Monitor patient's weight and dietary intake as ordered or per policy  Utilize nutrition screening tool and intervene as necessary  Determine patient's food preferences and provide high-protein, high-caloric foods as appropriate       INTERVENTIONS:  - Monitor oral intake, urinary output, labs, and treatment plans  - Assess nutrition and hydration status and recommend course of action  - Evaluate amount of meals eaten  - Assist patient with eating if necessary   - Allow adequate time for meals  - Recommend/ encourage appropriate diets, oral nutritional supplements, and vitamin/mineral supplements  - Order, calculate, and assess calorie counts as needed  - Recommend, monitor, and adjust tube feedings and TPN/PPN based on assessed needs  - Assess need for intravenous fluids  - Provide specific nutrition/hydration education as appropriate  - Include patient/family/caregiver in decisions related to nutrition  11/17/2019 1824 by Daija Wheeler KAHLIL Mak  Outcome: Progressing  11/17/2019 1632 by Matthew Basilio RN  Outcome: Progressing     Problem: PAIN - ADULT  Goal: Verbalizes/displays adequate comfort level or baseline comfort level  Description  Interventions:  - Encourage patient to monitor pain and request assistance  - Assess pain using appropriate pain scale  - Administer analgesics based on type and severity of pain and evaluate response  - Implement non-pharmacological measures as appropriate and evaluate response  - Consider cultural and social influences on pain and pain management  - Notify physician/advanced practitioner if interventions unsuccessful or patient reports new pain  11/17/2019 1824 by Matthew Basilio RN  Outcome: Progressing  11/17/2019 1632 by Matthew Basilio RN  Outcome: Progressing     Problem: SAFETY ADULT  Goal: Maintain or return to baseline ADL function  Description  INTERVENTIONS:  -  Assess patient's ability to carry out ADLs; assess patient's baseline for ADL function and identify physical deficits which impact ability to perform ADLs (bathing, care of mouth/teeth, toileting, grooming, dressing, etc )  - Assess/evaluate cause of self-care deficits   - Assess range of motion  - Assess patient's mobility; develop plan if impaired  - Assess patient's need for assistive devices and provide as appropriate  - Encourage maximum independence but intervene and supervise when necessary  - Involve family in performance of ADLs  - Assess for home care needs following discharge   - Consider OT consult to assist with ADL evaluation and planning for discharge  - Provide patient education as appropriate  11/17/2019 1824 by Matthew Basilio RN  Outcome: Progressing  11/17/2019 1632 by Matthew Basilio RN  Outcome: Progressing  Goal: Maintain or return mobility status to optimal level  Description  INTERVENTIONS:  - Assess patient's baseline mobility status (ambulation, transfers, stairs, etc )    - Identify cognitive and physical deficits and behaviors that affect mobility  - Identify mobility aids required to assist with transfers and/or ambulation (gait belt, sit-to-stand, lift, walker, cane, etc )  - [place] fall precautions as indicated by assessment  - Record patient progress and toleration of activity level on Mobility SBAR; progress patient to next Phase/Stage  - Instruct patient to call for assistance with activity based on assessment  - Consider rehabilitation consult to assist with strengthening/weightbearing, etc   11/17/2019 1824 by Gurvinder Penny RN  Outcome: Progressing  11/17/2019 1632 by Gurvinder Penny RN  Outcome: Progressing     Problem: DISCHARGE PLANNING  Goal: Discharge to home or other facility with appropriate resources  Description  INTERVENTIONS:  - Identify barriers to discharge w/patient and caregiver  - Arrange for needed discharge resources and transportation as appropriate  - Identify discharge learning needs (meds, wound care, etc )  - Arrange for interpretive services to assist at discharge as needed  - Refer to Case Management Department for coordinating discharge planning if the patient needs post-hospital services based on physician/advanced practitioner order or complex needs related to functional status, cognitive ability, or social support system  11/17/2019 1824 by Gurvinder Penny RN  Outcome: Progressing  11/17/2019 1632 by Gurvinder Penny RN  Outcome: Progressing     Problem: Knowledge Deficit  Goal: Patient/family/caregiver demonstrates understanding of disease process, treatment plan, medications, and discharge instructions  Description  Complete learning assessment and assess knowledge base    Interventions:  - Provide teaching at level of understanding  - Provide teaching via preferred learning methods  11/17/2019 1824 by Gurvinder Penny RN  Outcome: Progressing  11/17/2019 1632 by Gurvinder Penny RN  Outcome: Progressing     Problem: INFECTION - ADULT  Goal: Absence or prevention of progression during hospitalization  Description  INTERVENTIONS:  - Assess and monitor for signs and symptoms of infection  - Monitor lab/diagnostic results  - Monitor all insertion sites, i e  indwelling lines, tubes, and drains  - Monitor endotracheal if appropriate and nasal secretions for changes in amount and color  - [place] appropriate cooling/warming therapies per order  - Administer medications as ordered  - Instruct and encourage patient and family to use good hand hygiene technique  - Identify and instruct in appropriate isolation precautions for identified infection/condition  11/17/2019 1824 by Gurvinder Penny RN  Outcome: Progressing  11/17/2019 1632 by Fabian Mak RN  Outcome: Progressing     Problem: RESPIRATORY - ADULT  Goal: Achieves optimal ventilation and oxygenation  Description  INTERVENTIONS:  - Assess for changes in respiratory status  - Assess for changes in mentation and behavior  - Position to facilitate oxygenation and minimize respiratory effort  - Oxygen administered by appropriate delivery if ordered  - Initiate smoking cessation education as indicated  - Encourage broncho-pulmonary hygiene including cough, deep breathe, Incentive Spirometry  - Assess the need for suctioning and aspirate as needed  - Assess and instruct to report SOB or any respiratory difficulty  - Respiratory Therapy support as indicated  11/17/2019 1824 by Gurvinder Penny RN  Outcome: Progressing  11/17/2019 1632 by Gurvinder Penny RN  Outcome: Progressing     Problem: MUSCULOSKELETAL - ADULT  Goal: Maintain or return mobility to safest level of function  Description  INTERVENTIONS:  - Assess patient's ability to carry out ADLs; assess patient's baseline for ADL function and identify physical deficits which impact ability to perform ADLs (bathing, care of mouth/teeth, toileting, grooming, dressing, etc )  - Assess/evaluate cause of self-care deficits   - Assess range of motion  - Assess patient's mobility  - Assess patient's need for assistive devices and provide as appropriate  - Encourage maximum independence but intervene and supervise when necessary  - Involve family in performance of ADLs  - Assess for home care needs following discharge   - Consider OT consult to assist with ADL evaluation and planning for discharge  - Provide patient education as appropriate  11/17/2019 1824 by Josiane River RN  Outcome: Progressing  11/17/2019 1632 by Josiane River RN  Outcome: Progressing  Goal: Maintain proper alignment of affected body part  Description  INTERVENTIONS:  - Support, maintain and protect limb and body alignment  - Provide patient/ family with appropriate education  11/17/2019 1824 by Josiane River RN  Outcome: Progressing  11/17/2019 1632 by Josiane River RN  Outcome: Progressing     Problem: Prexisting or High Potential for Compromised Skin Integrity  Goal: Skin integrity is maintained or improved  Description  INTERVENTIONS:  - Identify patients at risk for skin breakdown  - Assess and monitor skin integrity  - Assess and monitor nutrition and hydration status  - Monitor labs   - Assess for incontinence   - Turn and reposition patient  - Assist with mobility/ambulation  - Relieve pressure over bony prominences  - Avoid friction and shearing  - Provide appropriate hygiene as needed including keeping skin clean and dry  - Evaluate need for skin moisturizer/barrier cream  - Collaborate with interdisciplinary team   - Patient/family teaching  - Consider wound care consult   Outcome: Progressing

## 2019-11-17 NOTE — ASSESSMENT & PLAN NOTE
Patient's calcium was normal earlier this week  Her corrected calcium level is 11 9  Will hydrate her with normal saline solution    Will follow calcium levels with serial blood tests

## 2019-11-17 NOTE — ASSESSMENT & PLAN NOTE
Patient meets criteria for sepsis as evidenced by heart of 106, respiratory rate of 32, leukocyte count of 20,000 with left shift    Lactic acid level is normal   Sepsis most likely due to pneumonia but since patient has new onset urine incontinence will get UA to rule out UTI as well

## 2019-11-17 NOTE — ED NOTES
Patient more awake in bed, speaking to family members more  Speech limited to one or two sentences but clear        Bianca Ortiz RN  11/17/19 [id number]

## 2019-11-17 NOTE — ASSESSMENT & PLAN NOTE
Patient has mild expiratory wheezing on exam   I placed her on DuoNeb nebulizers and Pulmicort nebulizers  I am not starting IV steroids yet

## 2019-11-17 NOTE — ED PROVIDER NOTES
History  Chief Complaint   Patient presents with    Weakness - Generalized     Pt was d/c'ed two days ago- [facility] went to see pt today and found pt to be increasingly weak with a wet cough and an SPO2 of 88 on room air  Patient is a 79 y/o F with h/o HTN, a-fib, recent admission for weakness presents to the ED with cough and worsening weakness  Patient was discharged from the hospital 2 days ago  Her daughter was taking care of her and states she developed a "wet cough" 2 nights ago  Patient's weakness has been worsening  The visiting nurse was at the house today and states her pulse ox was 90% RA and she was tachycardic  The history was obtained by EMS, daughter and granddaughter  History provided by:  EMS personnel and relative  Cough   Cough characteristics:  Productive  Sputum characteristics:  Nondescript  Severity:  Moderate  Onset quality:  Sudden  Duration:  2 days  Timing:  Constant  Progression:  Worsening  Chronicity:  New  Smoker: no    Context: sick contacts (daughter sick with similar symptoms  )    Relieved by:  Nothing  Worsened by:  Nothing  Ineffective treatments:  None tried  Associated symptoms: shortness of breath and wheezing    Associated symptoms: no chest pain, no fever, no rash and no sinus congestion        Prior to Admission Medications   Prescriptions Last Dose Informant Patient Reported? Taking?    PARoxetine (PAXIL) 30 mg tablet Past Week at Unknown time  No Yes   Sig: TAKE 1 TABLET EVERY MORNING   albuterol (PROAIR HFA) 90 mcg/act inhaler Past Month at Unknown time  No Yes   Sig: Inhale 2 puffs every 4 (four) hours as needed for wheezing or shortness of breath   brinzolamide (AZOPT) 1 % ophthalmic suspension 11/16/2019 at Unknown time Self Yes Yes   Sig: Apply to eye daily at bedtime    losartan (COZAAR) 100 MG tablet 11/16/2019 at Unknown time  No Yes   Sig: TAKE 1 TABLET DAILY   metoprolol succinate (TOPROL-XL) 50 mg 24 hr tablet 11/16/2019 at Unknown time  No Yes Sig: TAKE 1 TABLET DAILY   nystatin (MYCOSTATIN) cream Past Month at Unknown time Self Yes Yes   Sig: Apply topically 2 (two) times a day   omeprazole (PriLOSEC) 20 mg delayed release capsule 11/16/2019 at Unknown time  No Yes   Sig: TAKE 1 CAPSULE DAILY      Facility-Administered Medications: None       Past Medical History:   Diagnosis Date    A-fib ([facility] [address] )     Osteoarthritis        History reviewed  No pertinent surgical history  Family History   Problem Relation Age of Onset    No Known Problems Mother     No Known Problems Father      I have reviewed and agree with the history as documented  Social History     Tobacco Use    Smoking status: Former Smoker    Smokeless tobacco: Never Used   Substance Use Topics    Alcohol use: No     Frequency: Never     Binge frequency: Never    Drug use: No        Review of Systems   Unable to perform ROS: Acuity of condition   Constitutional: Negative for fever  HENT: Negative for congestion  Respiratory: Positive for cough, shortness of breath and wheezing  Cardiovascular: Negative for chest pain and leg swelling  Gastrointestinal: Negative for diarrhea and vomiting  Musculoskeletal: Negative for back pain and neck pain  Skin: Negative for color change and rash  Neurological: Positive for weakness  Physical Exam  Physical Exam   Constitutional: She appears well-developed and well-nourished  She is cooperative  She has a sickly appearance  She appears ill  She appears distressed  HENT:   Head: Normocephalic and atraumatic  Right Ear: Decreased hearing is noted  Left Ear: Decreased hearing is noted  Nose: Nose normal    Mouth/Throat: Mucous membranes are pale and dry  Eyes: Conjunctivae are normal  Right pupil is round  Left pupil is round  Pupils are equal    Neck: Normal range of motion  Cardiovascular: Regular rhythm and normal heart sounds  Tachycardia present  No murmur heard    Pulmonary/Chest: Effort normal  She has decreased breath sounds (poor respiratory effort)  Abdominal: Soft  Normal appearance and bowel sounds are normal  There is no tenderness  Musculoskeletal: She exhibits no edema  Neurological: She is alert  Skin: Skin is warm and dry  No rash noted  She is not diaphoretic  There is pallor  Nursing note and vitals reviewed        Vital Signs  ED Triage Vitals   Temperature Pulse Respirations Blood Pressure SpO2   11/17/19 1217 11/17/19 1217 11/17/19 1217 11/17/19 1217 11/17/19 1217   97 7 °F (36 5 °C) (!) 111 (!) 28 (!) 187/89 99 %      Temp Source Heart Rate Source Patient Position - Orthostatic VS BP Location FiO2 (%)   11/17/19 1415 11/17/19 1217 11/17/19 1217 11/17/19 1217 --   Tympanic Monitor Sitting Left arm       Pain Score       11/17/19 1217       No Pain           Vitals:    11/17/19 1345 11/17/19 1400 11/17/19 1415 11/17/19 1444   BP:   146/65 138/59   Pulse: 91 99 97 89   Patient Position - Orthostatic VS:   Sitting Lying         Visual Acuity  Visual Acuity      Most Recent Value   L Pupil Size (mm)  4   R Pupil Size (mm)  4          ED Medications  Medications   brinzolamide (AZOPT) 1 % ophthalmic suspension 1 drop (has no administration in time range)   losartan (COZAAR) tablet 100 mg (has no administration in time range)   metoprolol succinate (TOPROL-XL) 24 hr tablet 50 mg (has no administration in time range)   PARoxetine (PAXIL) tablet 20 mg (has no administration in time range)   ondansetron (ZOFRAN) injection 4 mg (has no administration in time range)   sodium chloride 0 9 % infusion (has no administration in time range)   enoxaparin (LOVENOX) subcutaneous injection 30 mg (has no administration in time range)   cefepime (MAXIPIME) 2 g/50 mL dextrose IVPB (has no administration in time range)   vancomycin (VANCOCIN) IVPB (premix) 750 mg (has no administration in time range)   acetaminophen (TYLENOL) tablet 650 mg (has no administration in time range)   ipratropium-albuterol (FOR EMS ONLY) (DUO-NEB) 0 5-2 5 mg/3 mL inhalation solution 3 mL (0 mL Does not apply Given to EMS 11/17/19 1305)   ipratropium-albuterol (DUO-NEB) 0 5-2 5 mg/3 mL inhalation solution 3 mL (3 mL Nebulization Given 11/17/19 1328)   cefepime (MAXIPIME) 2 g/50 mL dextrose IVPB (0 mg Intravenous Stopped 11/17/19 1403)   vancomycin (VANCOCIN) IVPB (premix) 750 mg (750 mg Intravenous New Bag 11/17/19 1410)       Diagnostic Studies  Results Reviewed     Procedure Component Value Units Date/Time    Troponin I [[id number]]  (Normal) Collected:  11/17/19 1413    Lab Status:  Final result Specimen:  Blood from Arm, Left Updated:  11/17/19 1523     Troponin I <0 02 ng/mL     APTT [[id number]]  (Abnormal) Collected:  11/17/19 1331    Lab Status:  Final result Specimen:  Blood from Arm, Left Updated:  11/17/19 1518     PTT 41 seconds     Protime-INR [[id number]]  (Abnormal) Collected:  11/17/19 1331    Lab Status:  Final result Specimen:  Blood from Arm, Left Updated:  11/17/19 1518     Protime 14 9 seconds      INR 1 20    NT-BNP PRO [[id number]]  (Abnormal) Collected:  11/17/19 1232    Lab Status:  Final result Specimen:  Blood from Arm, Left Updated:  11/17/19 1405     NT-proBNP 1,945 pg/mL     Lactic acid x2 [[id number]]  (Normal) Collected:  11/17/19 1232    Lab Status:  Final result Specimen:  Blood from Arm, Left Updated:  11/17/19 1313     LACTIC ACID 1 5 mmol/L     Narrative:       Result may be elevated if tourniquet was used during collection      Comprehensive metabolic panel [[id number]]  (Abnormal) Collected:  11/17/19 1232    Lab Status:  Final result Specimen:  Blood from Arm, Left Updated:  11/17/19 1313     Sodium 134 mmol/L      Potassium 3 7 mmol/L      Chloride 99 mmol/L      CO2 32 mmol/L      ANION GAP 3 mmol/L      BUN 19 mg/dL      Creatinine 0 97 mg/dL      Glucose 124 mg/dL      Calcium 10 6 mg/dL      AST 26 U/L      ALT 23 U/L      Alkaline Phosphatase 66 U/L      Total Protein 7 1 g/dL      Albumin 2 8 g/dL Total Bilirubin 1 00 mg/dL      eGFR 49 ml/min/1 73sq m     Narrative:       [place] guidelines for Chronic Kidney Disease (CKD):     Stage 1 with normal or high GFR (GFR > 90 mL/min/1 73 square meters)    Stage 2 Mild CKD (GFR = 60-89 mL/min/1 73 square meters)    Stage 3A Moderate CKD (GFR = 45-59 mL/min/1 73 square meters)    Stage 3B Moderate CKD (GFR = 30-44 mL/min/1 73 square meters)    Stage 4 Severe CKD (GFR = 15-29 mL/min/1 73 square meters)    Stage 5 End Stage CKD (GFR <15 mL/min/1 73 square meters)  Note: GFR calculation is accurate only with a steady state creatinine    CBC and differential [[id number]]  (Abnormal) Collected:  11/17/19 1232    Lab Status:  Final result Specimen:  Blood from Arm, Left Updated:  11/17/19 1250     WBC 20 56 Thousand/uL      RBC 4 45 Million/uL      Hemoglobin 14 4 g/dL      Hematocrit 44 5 %       fL      MCH 32 4 pg      MCHC 32 4 g/dL      RDW 11 9 %      MPV 9 9 fL      Platelets 649 Thousands/uL      nRBC 0 /100 WBCs      Neutrophils Relative 82 %      Immat GRANS % 1 %      Lymphocytes Relative 8 %      Monocytes Relative 9 %      Eosinophils Relative 0 %      Basophils Relative 0 %      Neutrophils Absolute 16 91 Thousands/µL      Immature Grans Absolute 0 12 Thousand/uL      Lymphocytes Absolute 1 69 Thousands/µL      Monocytes Absolute 1 78 Thousand/µL      Eosinophils Absolute 0 01 Thousand/µL      Basophils Absolute 0 05 Thousands/µL     Blood culture #2 [[id number]] Collected:  11/17/19 1232    Lab Status: In process Specimen:  Blood from Arm, Left Updated:  11/17/19 1249    Blood culture #1 [[id number]] Collected:  11/17/19 1232    Lab Status:   In process Specimen:  Blood from Arm, Left Updated:  11/17/19 1247                 XR chest 2 views   ED Interpretation by Patricia Iyer PA-C (11/17 6000)   LLL pneumonia                 Procedures  ECG 12 Lead Documentation Only  Date/Time: 11/17/2019 1:57 PM  Performed by: Gurpreet Crow PA-C  Authorized by: Gurpreet Crow PA-C     Indications / Diagnosis:  Tachycardia  ECG reviewed by me, the ED Provider: yes    Patient location:  ED  Previous ECG:     Previous ECG:  Compared to current    Comparison ECG info:  ST depression now present in V4, V5    Similarity:  Changes noted  Rate:     ECG rate:  110    ECG rate assessment: tachycardic    Rhythm:     Rhythm: sinus tachycardia    ST segments:     ST segments:  Abnormal    Depression:  V4 and V5  T waves:     T waves: inverted      Inverted:  AVL and aVR           ED Course                   Initial Sepsis Screening     Row Name 11/17/19 0847                Is the patient's history suggestive of a new or worsening infection? (!) Yes (Proceed)  -CD        Suspected source of infection  pneumonia  -CD        Are two or more of the following signs & symptoms of infection both present and new to the patient? (!) Yes (Proceed)  -CD        Indicate SIRS criteria  Tachycardia > 90 bpm;Leukocytosis (WBC > 16511 IJL); Tachypnea > 20 resp per min  -CD        If the answer is yes to both questions, suspicion of sepsis is present          If severe sepsis is present AND tissue hypoperfusion perists in the hour after fluid resuscitation or lactate > 4, the patient meets criteria for SEPTIC SHOCK          Are any of the following organ dysfunction criteria present within 6 hours of suspected infection and SIRS criteria that are NOT considered to be chronic conditions? No  -CD        Organ dysfunction          Date of presentation of severe sepsis          Time of presentation of severe sepsis          Tissue hypoperfusion persists in the hour after crystalloid fluid administration, evidenced, by either:          Was hypotension present within one hour of the conclusion of crystalloid fluid administration?           Date of presentation of septic shock          Time of presentation of septic shock            User Key  (r) = Recorded By, (t) = Taken By, (c) = Cosigned By    [address] Name Provider Type    FATUMA Fenton PA-C Physician Assistant                  MDM  Number of Diagnoses or Management Options  Healthcare-associated pneumonia: new and requires workup  Hypoxia: new and requires workup  Diagnosis management comments: Patient with cough, weakness, will order labs, CXR to r/o pneumonia, CHF, anemia, dehydration, cardiac disease  Patient with pneumonia, will start on antibiotics and admit  Amount and/or Complexity of Data Reviewed  Clinical lab tests: ordered and reviewed  Tests in the radiology section of CPT®: ordered and reviewed  Discuss the patient with other providers: yes (Dr Peace Elizabeth)  Independent visualization of images, tracings, or specimens: yes    Patient Progress  Patient progress: stable      Disposition  Final diagnoses:   Healthcare-associated pneumonia   Hypoxia     Time reflects when diagnosis was documented in both MDM as applicable and the Disposition within this note     Time User Action Codes Description Comment    11/17/2019  2:03 PM Kamaljit Poole [J18 [id number]] Healthcare-associated pneumonia     11/17/2019  2:03 PM Kamaljit Poole [R09 02] Hypoxia       ED Disposition     ED Disposition Condition Date/Time Comment    Admit Stable Sun Nov 17, 2019  2:02 PM Case was discussed with Dr Peace Elizabeth and the patient's admission status was agreed to be Admission Status: inpatient status to the service of Dr Peace Elizabeth           Follow-up Information    None         Current Discharge Medication List      CONTINUE these medications which have NOT CHANGED    Details   albuterol (PROAIR HFA) 90 mcg/act inhaler Inhale 2 puffs every 4 (four) hours as needed for wheezing or shortness of breath  Qty: 1 Inhaler, Refills: 3    Associated Diagnoses: Mild intermittent asthma without complication      brinzolamide (AZOPT) 1 % ophthalmic suspension Apply to eye daily at bedtime       losartan (COZAAR) 100 MG tablet TAKE 1 TABLET DAILY  Qty: 90 tablet, Refills: 4    Associated Diagnoses: Essential hypertension      metoprolol succinate (TOPROL-XL) 50 mg 24 hr tablet TAKE 1 TABLET DAILY  Qty: 90 tablet, Refills: 3    Associated Diagnoses: Essential hypertension      nystatin (MYCOSTATIN) cream Apply topically 2 (two) times a day      omeprazole (PriLOSEC) 20 mg delayed release capsule TAKE 1 CAPSULE DAILY  Qty: 90 capsule, Refills: 3    Associated Diagnoses: Gastroesophageal reflux disease without esophagitis      PARoxetine (PAXIL) 30 mg tablet TAKE 1 TABLET EVERY MORNING  Qty: 90 tablet, Refills: 4    Associated Diagnoses: Dysthymia           No discharge procedures on file      ED Provider  Electronically Signed by           Pierre Piedra PA-C  11/17/19 9125

## 2019-11-18 ENCOUNTER — APPOINTMENT (INPATIENT)
Dept: NON INVASIVE DIAGNOSTICS | Facility: HOSPITAL | Age: 84
DRG: 871 | End: 2019-11-18
Payer: MEDICARE

## 2019-11-18 LAB
ANION GAP SERPL CALCULATED.3IONS-SCNC: 4 MMOL/L (ref 4–13)
BUN SERPL-MCNC: 21 MG/DL (ref 5–25)
CALCIUM SERPL-MCNC: 9.5 MG/DL (ref 8.3–10.1)
CHLORIDE SERPL-SCNC: 102 MMOL/L (ref 100–108)
CO2 SERPL-SCNC: 29 MMOL/L (ref 21–32)
CREAT SERPL-MCNC: 0.76 MG/DL (ref 0.6–1.3)
ERYTHROCYTE [DISTWIDTH] IN BLOOD BY AUTOMATED COUNT: 12 % (ref 11.6–15.1)
GFR SERPL CREATININE-BSD FRML MDRD: 65 ML/MIN/1.73SQ M
GLUCOSE SERPL-MCNC: 109 MG/DL (ref 65–140)
HCT VFR BLD AUTO: 34.8 % (ref 34.8–46.1)
HCT VFR BLD AUTO: 40.3 % (ref 34.8–46.1)
HGB BLD-MCNC: 11.4 G/DL (ref 11.5–15.4)
HGB BLD-MCNC: 12.9 G/DL (ref 11.5–15.4)
L PNEUMO1 AG UR QL IA.RAPID: NEGATIVE
MCH RBC QN AUTO: 32.5 PG (ref 26.8–34.3)
MCHC RBC AUTO-ENTMCNC: 32.8 G/DL (ref 31.4–37.4)
MCV RBC AUTO: 99 FL (ref 82–98)
PLATELET # BLD AUTO: 226 THOUSANDS/UL (ref 149–390)
PMV BLD AUTO: 9.9 FL (ref 8.9–12.7)
POTASSIUM SERPL-SCNC: 3.6 MMOL/L (ref 3.5–5.3)
PROCALCITONIN SERPL-MCNC: 0.36 NG/ML
RBC # BLD AUTO: 3.51 MILLION/UL (ref 3.81–5.12)
S PNEUM AG UR QL: NEGATIVE
SODIUM SERPL-SCNC: 135 MMOL/L (ref 136–145)
WBC # BLD AUTO: 13.24 THOUSAND/UL (ref 4.31–10.16)

## 2019-11-18 PROCEDURE — 99232 SBSQ HOSP IP/OBS MODERATE 35: CPT | Performed by: INTERNAL MEDICINE

## 2019-11-18 PROCEDURE — 85018 HEMOGLOBIN: CPT | Performed by: NURSE PRACTITIONER

## 2019-11-18 PROCEDURE — 85027 COMPLETE CBC AUTOMATED: CPT | Performed by: INTERNAL MEDICINE

## 2019-11-18 PROCEDURE — 92610 EVALUATE SWALLOWING FUNCTION: CPT

## 2019-11-18 PROCEDURE — 84145 PROCALCITONIN (PCT): CPT | Performed by: INTERNAL MEDICINE

## 2019-11-18 PROCEDURE — G8988 SELF CARE GOAL STATUS: HCPCS

## 2019-11-18 PROCEDURE — 80048 BASIC METABOLIC PNL TOTAL CA: CPT | Performed by: INTERNAL MEDICINE

## 2019-11-18 PROCEDURE — 85014 HEMATOCRIT: CPT | Performed by: NURSE PRACTITIONER

## 2019-11-18 PROCEDURE — 97167 OT EVAL HIGH COMPLEX 60 MIN: CPT

## 2019-11-18 PROCEDURE — G8987 SELF CARE CURRENT STATUS: HCPCS

## 2019-11-18 PROCEDURE — 93306 TTE W/DOPPLER COMPLETE: CPT | Performed by: INTERNAL MEDICINE

## 2019-11-18 PROCEDURE — 93306 TTE W/DOPPLER COMPLETE: CPT

## 2019-11-18 RX ORDER — DOCUSATE SODIUM 100 MG/1
100 CAPSULE, LIQUID FILLED ORAL 2 TIMES DAILY
Status: DISCONTINUED | OUTPATIENT
Start: 2019-11-18 | End: 2019-11-18

## 2019-11-18 RX ORDER — POLYETHYLENE GLYCOL 3350 17 G/17G
17 POWDER, FOR SOLUTION ORAL DAILY PRN
Status: DISCONTINUED | OUTPATIENT
Start: 2019-11-18 | End: 2019-11-18 | Stop reason: SDUPTHER

## 2019-11-18 RX ORDER — POLYETHYLENE GLYCOL 3350 17 G/17G
17 POWDER, FOR SOLUTION ORAL DAILY PRN
Status: DISCONTINUED | OUTPATIENT
Start: 2019-11-18 | End: 2019-11-20 | Stop reason: HOSPADM

## 2019-11-18 RX ORDER — AMOXICILLIN 250 MG
1 CAPSULE ORAL
Status: DISCONTINUED | OUTPATIENT
Start: 2019-11-18 | End: 2019-11-20 | Stop reason: HOSPADM

## 2019-11-18 RX ADMIN — VANCOMYCIN HYDROCHLORIDE 750 MG: 750 INJECTION, SOLUTION INTRAVENOUS at 15:25

## 2019-11-18 RX ADMIN — CEFEPIME HYDROCHLORIDE 2000 MG: 2 INJECTION, SOLUTION INTRAVENOUS at 14:40

## 2019-11-18 RX ADMIN — BRINZOLAMIDE 1 DROP: 10 SUSPENSION/ DROPS OPHTHALMIC at 15:37

## 2019-11-18 RX ADMIN — ENOXAPARIN SODIUM 30 MG: 30 INJECTION SUBCUTANEOUS at 09:34

## 2019-11-18 NOTE — OCCUPATIONAL THERAPY NOTE
Occupational Therapy Cancellation Note:    OT orders received and chart reviewed  Patients daughter declined OT Evaluation this morning as patient was sleeping  Agreeable to a session later in the day  Will continue to follow if time permits      DANIELA Solitario/NYDIA

## 2019-11-18 NOTE — ASSESSMENT & PLAN NOTE
Patient was confused this morning not recognizing family members    She has acute metabolic encephalopathy due to pneumonia, sepsis  - metabolic encephalopathy resolving, patient is able to converse with her daughter

## 2019-11-18 NOTE — OCCUPATIONAL THERAPY NOTE
Occupational Therapy Evaluation      Klever Daley    11/18/2019    Principal Problem:    Pneumonia  Active Problems:    Essential hypertension    Mild intermittent asthma without complication    Sepsis ([facility] [address] )    Hyponatremia    Hypercalcemia    Encephalopathy, metabolic      Past Medical History:   Diagnosis Date    A-fib ([facility] [address] )     Osteoarthritis        History reviewed  No pertinent surgical history  11/18/19 9645   Note Type   Note type Eval only   Restrictions/Precautions   Weight Bearing Precautions Per Order No   Other Precautions Cognitive; Chair Alarm; Bed Alarm;Multiple lines;O2;Fall Risk;Hard of hearing   Pain Assessment   Pain Assessment No/denies pain   Home Living   Type of Home House   Home Layout Two level;Bed/bath upstairs  (1st fl s/u w commode)   Home Equipment Walker   Prior Function   Level of [place] Needs assistance with ADLs and functional mobility   Lives With Daughter   Receives Help From Family   ADL Assistance Needs assistance   IADLs Needs assistance   Falls in the last 6 months 1 to 4   Lifestyle   Autonomy Pt was admitted 11/12-11/15 after fall at home  Dtrs preference at that time was to take the patient home with a 1st floor setup and RW; OT/PT also recommended private duty aides to assist  Dtr brought pt back for current admission 2* increased confusion and inability to ambulate  Dtr now agreeable to STR  Psychosocial   Psychosocial (WDL) WDL   Subjective   Subjective Pt [place] but pleasant   ADL   Eating Assistance 4  Minimal Assistance   Eating Deficit   (dtr assisting as needed in hosp)   Grooming Assistance 4  Minimal Assistance   UB Bathing Assistance 4  Minimal Assistance   LB Bathing Assistance 2  Maximal [address] 4  [address] 2  Maximal [address]  2  Maximal Assistance   Bed Mobility   Additional Comments Pt received OOB in [facility] chair   Attempted to help patient OOB 2x this AM  1st attempt dtr declined 2* patient sleeping, 2nd attempt pt receiving bedside testing  Transfers   Sit to Stand 3  Moderate assistance   Additional items Assist x 2   Stand to Sit 3  Moderate assistance   Additional items Assist x 2   Additional Comments only able to stand approx 15 sec   Functional Mobility   Additional Comments unable to assess at this time; will assess during future session   Activity Tolerance   Activity Tolerance Patient tolerated treatment well;Treatment limited secondary to medical complications (Comment)  (pt bleeding heavily from IV site)   Nurse Made Aware RN Renee Love and KAHLIL Daniel Fitting; Pt bleeding heavily from IV site, RNs informed and present at end of session to address   RUE Assessment   RUE Assessment WFL   LUE Assessment   LUE Assessment WFL   Cognition   Overall Cognitive Status Impaired   Arousal/Participation Cooperative   Attention Attends with cues to redirect   Orientation Level Oriented to person;Oriented to place; Disoriented to time;Disoriented to situation   Memory Decreased recall of recent events   Following Commands Follows one step commands with increased time or repetition   Assessment   Limitation Decreased ADL status; Decreased UE strength;Decreased Safe judgement during ADL;Decreased cognition;Decreased endurance;Decreased self-care trans   Prognosis Fair   Assessment Pt is a 80 y o  female seen for OT evaluation at [facility], admitted 11/17/2019 w/ Pneumonia  OT completed extensive review of pt's medical and social history  Comorbidities affecting pt's functional performance at time of assessment include: HTN, asthma, sepsis, encephalopathy, depression, unsteady gait  Personal factors affecting pt at time of IE include:steps to enter environment, difficulty performing ADLS, limited insight into deficits and health management   Prior to admission, pt was living in [facility] with dtr and was assisted for ADL, dependent for IADL   Pt was admitted 11/12-11/15 after fall at home  Dtrs preference at that time was to take the patient home with a 1st floor setup and RW; OT/PT also recommended private duty aides to assist  Dtr brought pt back for current admission 2* increased confusion and inability to ambulate  Dtr now agreeable to STR if absolutely necessary due to feeling unable to care for her mother if she cannot walk  Upon evaluation, pt presents to OT below baseline due to the following performance deficits: weakness, decreased strength, decreased balance, decreased tolerance, impaired memory, impaired sequencing, impaired problem solving and decreased safety awareness  Pt to benefit from continued skilled OT tx while in the hospital to address deficits as defined above and maximize level of functional independence w ADL's and functional mobility  Occupational Performance areas to address include: eating, grooming, bathing/shower, toilet hygiene, dressing, functional mobility and bed mobility, functional transfers  Based on findings, pt is of high complexity  At this time, OT recommendations at time of discharge are short term rehab  Goals   Patient Goals none stated   Plan   Treatment Interventions ADL retraining;Functional transfer training;UE strengthening/ROM; Endurance training;Cognitive reorientation;Patient/family training;Equipment evaluation/education; Compensatory technique education;Continued evaluation; Energy conservation   Goal Expiration Date 11/28/19   OT Frequency 2-3x/wk   Recommendation   OT Discharge Recommendation Short Term Rehab   OT - OK to Discharge Yes  (to STR when medically stable)   Barthel Index   Feeding 5   Bathing 0   Grooming Score 0   Dressing Score 0   Bladder Score 5   Bowels Score 10   Toilet Use Score 5   Transfers (Bed/Chair) Score 5   Mobility (Level Surface) Score 0   Stairs Score 0   Barthel Index Score 30     Pt will achieve the following goals within 10 days  *Pt will complete self-feeding with setup      *Pt will complete grooming with setup     *Pt will complete UB bathing and dressing with setup and supervision  *Pt will complete LB bathing and dressing with Min A      *Pt will complete toileting (hygiene and clothing management) with supervision  *Pt will complete bed mobility with supervision, with bed flat and no side rail to prep for purposeful tasks    *Pt will perform functional transfers with CGA/SBA in order to complete ADL routine  *Pt will increase standing tolerance to 2+ minutes in order to complete lia hygiene/bathing  *Pt will demonstrate increased activity tolerance in order to complete ADL routine       spigit, OT

## 2019-11-18 NOTE — ASSESSMENT & PLAN NOTE
History of asthma, presented with mild expiratory wheezing on exam   Currently on DuoNeb and Pulmicort  Continue current plan  No longer wheezing

## 2019-11-18 NOTE — SOCIAL WORK
LOS: 1  PATIENT IS NOT A MEDICARE BUNDLE  SHE IS A 30 DAY READMISSION  Met with patient with her daughter and caregiver Jessika Peter  Explained role of care management and reviewed the discharge planning process including identifying help at home and patient preference for discharge planning  Patient with recent stay here 11/12/19-11/15/19  As per daughter, patient was very weak, had fall where she was lowered to the ground  When assessed by visiting nurse was told to come to ER as she was SOB and tachycardic with a moist cough  Patient resides with her daughter in a 2 story home with 4 TOBY  The only bed and bath are on the 2nd floor  Patient recently has had much difficulty climbing steps and Jessika Peter has made a first floor set up for patient  Patient is independent of ADL's and does not use a assistive device  Since discharge from the hospital she has been confused, weak, incontinent of urine and unable to ambulate  Daughter provides meals and transports  There is a RW at home from another family member  Patient uses a mail order system for chronic meds and [facility] in [place] for new ones  Patient has a POA/AD, was asked to bring it in  Patient has no history [address] , SNF or D &A rehab admission  Current with [facility]  Await PT/OT recommendation  Discussed probable need for SNF with daughter and she is agreeable if needed  Referral via [facility] to [facility]  Will follow

## 2019-11-18 NOTE — PLAN OF CARE
Problem: OCCUPATIONAL THERAPY ADULT  Goal: Performs self-care activities at highest level of function for planned discharge setting  See evaluation for individualized goals  Outcome: Progressing  Note:   Limitation: Decreased ADL status, Decreased UE strength, Decreased Safe judgement during ADL, Decreased cognition, Decreased endurance, Decreased self-care trans  Prognosis: Fair  Assessment: Pt is a 80 y o  female seen for OT evaluation at [facility], admitted 11/17/2019 w/ Pneumonia  OT completed extensive review of pt's medical and social history  Comorbidities affecting pt's functional performance at time of assessment include: HTN, asthma, sepsis, encephalopathy, depression, unsteady gait  Personal factors affecting pt at time of IE include:steps to enter environment, difficulty performing ADLS, limited insight into deficits and health management   Prior to admission, pt was living in [facility] with dtr and was assisted for ADL, dependent for IADL  Pt was admitted 11/12-11/15 after fall at home  Dtrs preference at that time was to take the patient home with a 1st floor setup and RW; OT/PT also recommended private duty aides to assist  Dtr brought pt back for current admission 2* increased confusion and inability to ambulate  Dtr now agreeable to STR if absolutely necessary due to feeling unable to care for her mother if she cannot walk  Upon evaluation, pt presents to OT below baseline due to the following performance deficits: weakness, decreased strength, decreased balance, decreased tolerance, impaired memory, impaired sequencing, impaired problem solving and decreased safety awareness  Pt to benefit from continued skilled OT tx while in the hospital to address deficits as defined above and maximize level of functional independence w ADL's and functional mobility   Occupational Performance areas to address include: eating, grooming, bathing/shower, toilet hygiene, dressing, functional mobility and bed mobility, functional transfers  Based on findings, pt is of high complexity  At this time, OT recommendations at time of discharge are short term rehab       OT Discharge Recommendation: Short Term Rehab  OT - OK to Discharge: Yes(to STR when medically stable)

## 2019-11-18 NOTE — ASSESSMENT & PLAN NOTE
Left lower lobe pneumonia, present on admission, as evidenced by productive cough   Currently on IV cefepime and vancomycin given recent hospitalization  Screen for MRSA, follow results  Speech and swallow consulted, await recommendation  Place on pureed diet for now   Follow blood culture results  Follow sputum cultures  Check urine Legionella and Streptococcus pneumoniae

## 2019-11-18 NOTE — SPEECH THERAPY NOTE
Speech Language/Pathology  Speech-Language Pathology Bedside Swallow Evaluation      Patient Name: Tawanna TENORIO Date: 11/18/2019     Problem List  Principal Problem:    Pneumonia  Active Problems:    Essential hypertension    Mild intermittent asthma without complication    [facility] ([facility] )    Hyponatremia    Hypercalcemia    Encephalopathy, metabolic      Past Medical History  Past Medical History:   Diagnosis Date    A-fib ([facility] )     Osteoarthritis        Past Surgical History  History reviewed  No pertinent surgical history  Summary   Patient demonstrating coughing and sneezing after PO trials  Patient is a high aspiration risk and is not safe for PO at this time  Limiting factor is patient fatigue and inability to follow commands  Patient required max cues to maintain arousal   Patient may be able to upgrade diet when more alert and able to participate  Risk/s for Aspiration: high     Recommended Diet: NPO   Recommended Form of Meds: non-oral if possible   Aspiration precautions and swallowing strategies: upright posture and only feed when fully alert  Consider Referral to/for:         Current Medical Status  Pt is a 80 y o  female who presented to [address] [place] [address] with confusion and persistent cough  Patient recently admitted to hospital for weakness and gait dysfunction       Current Precautions:  Fall  Aspiration    Past medical history:     Please see H&P for details    Special Studies:  CXR on 11/17: Patchy density seen left lower lung suggest infiltrate    Social/Education/Vocational Hx:  Pt lives with family    Swallow Information   Current Risks for Dysphagia & Aspiration: decreased alertness  Current Symptoms/Concerns: choking incident, wet vocal quality and change in respiratory status  Current Diet: puree/level 1 diet   Baseline Diet: regular diet and thin liquids per patients daughter      Baseline Assessment   Behavior/Cognition: lethargic, waxing and waning arousal level, decreased attention and low alertness level  Speech/Language Status: not able to to follow commands and limited verbal output  Patient Positioning: upright in chair  Pain Status/Interventions/Response to Interventions:   No report of or nonverbal indications of pain  Swallow Mechanism Exam  Could not formally assess secondary to inability to maintain arousal or follow directions  However thickened secretions seen over tongue and soft palate  SLP cleaned oral cavity and suctioned signficiant amount of secretions from oral cavity  Consistencies Assessed and Performance   Consistencies Administered: puree      Oral Stage: No labial seal around spoon for retrieval   SLP placed puree in mouth from spoon  Minimal lingual movements for bolus formation  Prolonged bolus transfer (about 30 seconds)  + oral residual which had to be suctioned by SLP from oral cavity  Pharyngeal Stage:Swallowing initiation appeared delayed  There is reduced laryngeal elevation and excursion to palpation  +coughing after swallow 2/3 trials  SLP suctioned pharynx with removal of puree seen in suction tubing  Esophageal Concerns: none reported    Strategies and Efficacy: unable to do    Summary and Recommendations (see above)    Results Reviewed with: RN     Pt/Family Education: initiated  Treatment Recommended: dysphagia therapy     Frequency of treatment: 3-5 times     Dysphagia LTG per SLP  -Patient will demonstrate optimum safety and efficacy of oral intake and swallowing function without overt s/sx of penetration or aspiration for the highest appropriate diet level       Short Term Goals:    -Patient will tolerate trials of upgraded food and/or liquid texture with no significant s/s of oral or pharyngeal dysphagia including aspiration across 1-3 diagnostic sessions       Speech Therapy Prognosis   Prognosis: fair    Prognosis Considerations: medical status and cognitive status

## 2019-11-18 NOTE — PROGRESS NOTES
Progress Note - Betzaida Vogel Dornsife 6/10/1921, 80 y o  female MRN: [id number]    Unit/Bed#: [address] [place] [address] Encounter: [id number]    Primary Care Provider: Alexandria Mora MD   Date and time admitted to hospital: 11/17/2019 12:16 PM        Encephalopathy, metabolic  Assessment & Plan  Patient was confused this morning not recognizing family members  She has acute metabolic encephalopathy due to pneumonia, sepsis  - metabolic encephalopathy resolving, patient is able to converse with her daughter    Hypercalcemia  Assessment & Plan  Patient's calcium was normal earlier this week  Her corrected calcium level is 11 9  Will hydrate her with normal saline solution  Will follow calcium levels with serial blood tests    Hyponatremia  Assessment & Plan  Patient has mild hyponatremia:  Sodium is 134  Will hydrate her with normal saline solution and will follow sodium levels    Sepsis ([address] )  Assessment & Plan  Patient meets criteria for sepsis as evidenced by heart of 106, respiratory rate of 32, leukocyte count of 20,000 with left shift  Lactic acid level is normal   Sepsis most likely due to pneumonia but since patient has new onset urine incontinence will get UA to rule out UTI as well  Continue with intravenous antibiotics  Follow blood culture results    Mild intermittent asthma without complication  Assessment & Plan  History of asthma, presented with mild expiratory wheezing on exam   Currently on DuoNeb and Pulmicort  Continue current plan  No longer wheezing     Essential hypertension  Assessment & Plan  Patient's blood pressure was uncontrolled on presentation to the ER with systolic blood pressure of more than 180 which has improved since then to systolic blood pressures in the 140s    Will continue losartan and metoprolol    * Pneumonia  Assessment & Plan  Left lower lobe pneumonia, present on admission, as evidenced by productive cough   Currently on IV cefepime and vancomycin given recent hospitalization  Screen for MRSA, follow results  Speech and swallow consulted, await recommendation  Place on pureed diet for now   Follow blood culture results  Follow sputum cultures  Check urine Legionella and Streptococcus pneumoniae      VTE Pharmacologic Prophylaxis:   Pharmacologic: Enoxaparin (Lovenox)  Mechanical VTE Prophylaxis in Place: Yes    Patient Centered Rounds: I have performed bedside rounds with nursing staff today  Education and Discussions with Family / Patient:  Discussed with patient's daughter at bedside    Time Spent for Care: 45 minutes  More than 50% of total time spent on counseling and coordination of care as described above  Current Length of Stay: 1 day(s)    Current Patient Status: Inpatient   Certification Statement: The patient will continue to require additional inpatient hospital stay due to Pneumonia    Discharge Plan:  2 days    Code Status: Level 3 - DNAR and DNI      Subjective:   No overnight events  Patient able to sleep well    Objective:     Vitals:   Temp (24hrs), Av 5 °F (36 9 °C), Min:97 8 °F (36 6 °C), Max:99 3 °F (37 4 °C)    Temp:  [97 8 °F (36 6 °C)-99 3 °F (37 4 °C)] 97 8 °F (36 6 °C)  HR:  [] 98  Resp:  [18-20] 20  BP: (134-143)/(60-85) 134/60  SpO2:  [96 %-99 %] 98 %  Body mass index is 20 06 kg/m²  Input and Output Summary (last 24 hours):     No intake or output data in the 24 hours ending 19 1703    Physical Exam:     Physical Exam   Constitutional: She appears well-developed and well-nourished  No distress  Cardiovascular: Normal rate and regular rhythm  Murmur heard  Pulmonary/Chest: Effort normal and breath sounds normal  No stridor  No respiratory distress  She has no wheezes  She has no rales  Abdominal: Soft  Bowel sounds are normal  She exhibits no distension and no mass  There is no tenderness  There is no guarding  Musculoskeletal: Normal range of motion  She exhibits no edema, tenderness or deformity  Skin: Skin is warm  She is not diaphoretic  No erythema  Vitals reviewed  Additional Data:     Labs:    Results from last 7 days   Lab Units 11/18/19  1322 11/18/19  0525  11/17/19  1232   WBC Thousand/uL  --  13 24*  --  20 56*   HEMOGLOBIN g/dL 12 9 11 4*  --  14 4   HEMATOCRIT % 40 3 34 8  --  44 5   PLATELETS Thousands/uL  --  226   < > 261   NEUTROS PCT %  --   --   --  82*   LYMPHS PCT %  --   --   --  8*   MONOS PCT %  --   --   --  9   EOS PCT %  --   --   --  0    < > = values in this interval not displayed  Results from last 7 days   Lab Units 11/18/19  0525 11/17/19  1232   SODIUM mmol/L 135* 134*   POTASSIUM mmol/L 3 6 3 7   CHLORIDE mmol/L 102 99*   CO2 mmol/L 29 32   BUN mg/dL 21 19   CREATININE mg/dL 0 76 0 97   ANION GAP mmol/L 4 3*   CALCIUM mg/dL 9 5 10 6*   ALBUMIN g/dL  --  2 8*   TOTAL BILIRUBIN mg/dL  --  1 00   ALK PHOS U/L  --  66   ALT U/L  --  23   AST U/L  --  26   GLUCOSE RANDOM mg/dL 109 124     Results from last 7 days   Lab Units 11/17/19  1331   INR  1 20*             Results from last 7 days   Lab Units 11/18/19  0525 11/17/19  1232   LACTIC ACID mmol/L  --  1 5   PROCALCITONIN ng/ml 0 36*  --            * I Have Reviewed All Lab Data Listed Above  * Additional Pertinent Lab Tests Reviewed: All Labs Within Last 24 Hours Reviewed    Imaging:    Imaging Reports Reviewed Today Include:  Chest x-ray      Recent Cultures (last 7 days):     Results from last 7 days   Lab Units 11/17/19  1851 11/17/19  1529 11/17/19  1232   BLOOD CULTURE   --   --  Received in Microbiology Lab  Culture in Progress  Received in Microbiology Lab  Culture in Progress     SPUTUM CULTURE   --  Culture too young- will reincubate  --    GRAM STAIN RESULT   --  1+ Epithelial cells per low power field*  4+ Disintegrating polys*  2+ Gram positive cocci in pairs, chains and clusters*  2+ Gram negative rods*  --    LEGIONELLA URINARY ANTIGEN  Negative  --   --        Last 24 Hours Medication List: Current Facility-Administered Medications:  acetaminophen 650 mg Oral Q6H PRN Lisa Voss MD    albuterol 2 5 mg Nebulization Q4H PRN Lisa Voss MD    brinzolamide 1 drop Both Eyes TID Lisa Voss MD    cefepime 2,000 mg Intravenous Q24H Lisa Voss MD Last Rate: 2,000 mg (11/18/19 1440)   enoxaparin 30 mg Subcutaneous Daily Lisa Voss MD    losartan 100 mg Oral Daily Lisa Voss MD    metoprolol succinate 50 mg Oral Daily Lisa Voss MD    ondansetron 4 mg Intravenous Q6H PRN Lisa Voss MD    PARoxetine 20 mg Oral Daily Lisa Voss MD    polyethylene glycol 17 g Oral Daily PRN TRENT Gomes    senna-docusate sodium 1 tablet Oral HS Yadira Calderon MD    vancomycin 15 mg/kg Intravenous Q24H Lisa Voss MD Last Rate: 750 mg (11/18/19 1525)        Today, Patient Was Seen By: Yadiar Calderon MD    ** Please Note: Dictation voice to text software may have been used in the creation of this document   **

## 2019-11-18 NOTE — PHYSICAL THERAPY NOTE
Physical Therapy Cancellation Note    Patients daughter declined P T  Evaluation this morning as patient was sleeping  Agreeable to a session later in the day  Will continue to follow  Moisés Parker, PT

## 2019-11-18 NOTE — ASSESSMENT & PLAN NOTE
Patient meets criteria for sepsis as evidenced by heart of 106, respiratory rate of 32, leukocyte count of 20,000 with left shift    Lactic acid level is normal   Sepsis most likely due to pneumonia but since patient has new onset urine incontinence will get UA to rule out UTI as well  Continue with intravenous antibiotics  Follow blood culture results

## 2019-11-19 ENCOUNTER — TRANSITIONAL CARE MANAGEMENT (OUTPATIENT)
Dept: FAMILY MEDICINE CLINIC | Facility: HOSPITAL | Age: 84
End: 2019-11-19

## 2019-11-19 LAB
BACTERIA SPT RESP CULT: ABNORMAL
BACTERIA SPT RESP CULT: ABNORMAL
GRAM STN SPEC: ABNORMAL
MRSA NOSE QL CULT: NORMAL

## 2019-11-19 PROCEDURE — G8979 MOBILITY GOAL STATUS: HCPCS

## 2019-11-19 PROCEDURE — 99232 SBSQ HOSP IP/OBS MODERATE 35: CPT | Performed by: INTERNAL MEDICINE

## 2019-11-19 PROCEDURE — 97163 PT EVAL HIGH COMPLEX 45 MIN: CPT

## 2019-11-19 PROCEDURE — G8978 MOBILITY CURRENT STATUS: HCPCS

## 2019-11-19 PROCEDURE — 97530 THERAPEUTIC ACTIVITIES: CPT

## 2019-11-19 PROCEDURE — 92526 ORAL FUNCTION THERAPY: CPT

## 2019-11-19 RX ORDER — CEFTRIAXONE 1 G/50ML
1000 INJECTION, SOLUTION INTRAVENOUS EVERY 24 HOURS
Status: DISCONTINUED | OUTPATIENT
Start: 2019-11-20 | End: 2019-11-20 | Stop reason: HOSPADM

## 2019-11-19 RX ADMIN — BRINZOLAMIDE 1 DROP: 10 SUSPENSION/ DROPS OPHTHALMIC at 17:31

## 2019-11-19 RX ADMIN — ENOXAPARIN SODIUM 30 MG: 30 INJECTION SUBCUTANEOUS at 09:14

## 2019-11-19 RX ADMIN — CEFEPIME HYDROCHLORIDE 2000 MG: 2 INJECTION, SOLUTION INTRAVENOUS at 13:50

## 2019-11-19 NOTE — PLAN OF CARE
Problem: OCCUPATIONAL THERAPY ADULT  Goal: Performs self-care activities at highest level of function for planned discharge setting  See evaluation for individualized goals  Description  ADL retraining;Functional transfer training;UE strengthening/ROM; Endurance training;Cognitive reorientation;Patient/family training;Equipment evaluation/education; Compensatory technique education; Energy conservation   Outcome: Progressing  Note:   Limitation: Decreased ADL status, Decreased UE strength, Decreased Safe judgement during ADL, Decreased cognition, Decreased endurance, Decreased self-care trans  Prognosis: Fair  Assessment: Patient participated in Skilled OT session this date with interventions consisting of ADL re training with the use of correct body mechnaics, safety awareness and fall prevention techniques,  therapeutic activities to: increase activity tolerance and increase dynamic sit/ stand balance during functional activity    Patient agreeable to OT treatment session, upon arrival patient was found seated OOB to Recliner  Pt increasingly alert and engaged during sessions  Treatment session as follows: performing sit<>stand transfers from chair/commode, toileting task requiring mod A x 1 to maintain balance and max A x 1 for toileting/hygiene care  Patient continues to be functioning below baseline level, occupational performance remains limited secondary to factors listed above and increased risk for falls and injury  From OT standpoint, recommendation at time of d/c would be Short Term Rehab  Patient to benefit from continued Occupational Therapy treatment while in the hospital to address deficits as defined above and maximize level of functional independence with ADLs and functional mobility  Pt left in bedside recliner with call bell in reach, tray table in reach, needs met, chair alarm activated, daughter present        OT Discharge Recommendation: Short Term Rehab  OT - OK to Discharge: Yes(Once cleared by MD)

## 2019-11-19 NOTE — SPEECH THERAPY NOTE
Speech Language/Pathology    Speech/Language Pathology Progress Note    Patient Name: Hugo Arnold  KIBQS'I Date: 11/19/2019     Subjective:  "Oh I ate & ate & ate I am filled up"  Current Diet: puree, thin   Objective:  Pt seen by speech yesterday, at the time she was not appropriate for po  Puree w/ thin was ordered & per staff the pt has been eating small amts at a time  Earlier ate for her dtr  Pt seen w/ puree, thin  Took only one tsp puree, then sat w/ cup of juice & sipped small amts  Mult swallows, no wet voicing noted  C/o being full   Pt w/ some mild wheeze noted but no gross coughing w/ the thin juice  Assessment:  Pt tolerates thin, one tsp puree w/o overt difficulty  She is more alert,awake today  Would continue this diet for now  Plan/Recommendations:  Puree w/ thin  Speech f/u as needed during stay

## 2019-11-19 NOTE — ASSESSMENT & PLAN NOTE
Left lower lobe pneumonia, present on admission, as evidenced by productive cough, leukocytosis and tachycardia  Currently on IV cefepime and vancomycin given recent hospitalization  Screen for MRSA, follow results  Continue with pureed diet  Blood cultures negative till date   Follow sputum culture results  Follow-up urine Legionella and Streptococcus pneumoniae results

## 2019-11-19 NOTE — ASSESSMENT & PLAN NOTE
Patient's calcium was normal earlier this week    Patient presented with elevated calcium 11 9  Status post IV fluid hydration  Trend CMP daily

## 2019-11-19 NOTE — PLAN OF CARE
Problem: Potential for Falls  Goal: Patient will remain free of falls  Description  INTERVENTIONS:  - Assess patient frequently for physical needs  -  Identify cognitive and physical deficits and behaviors that affect risk of falls  -  [place] fall precautions as indicated by assessment   - Educate patient/family on patient safety including physical limitations  - Instruct patient to call for assistance with activity based on assessment  - Modify environment to reduce risk of injury  - Consider OT/PT consult to assist with strengthening/mobility  Outcome: Progressing     Problem: Nutrition/Hydration-ADULT  Goal: Nutrient/Hydration intake appropriate for improving, restoring or maintaining nutritional needs  Description  Monitor and assess patient's nutrition/hydration status for malnutrition  Collaborate with interdisciplinary team and initiate plan and interventions as ordered  Monitor patient's weight and dietary intake as ordered or per policy  Utilize nutrition screening tool and intervene as necessary  Determine patient's food preferences and provide high-protein, high-caloric foods as appropriate       INTERVENTIONS:  - Monitor oral intake, urinary output, labs, and treatment plans  - Assess nutrition and hydration status and recommend course of action  - Evaluate amount of meals eaten  - Assist patient with eating if necessary   - Allow adequate time for meals  - Recommend/ encourage appropriate diets, oral nutritional supplements, and vitamin/mineral supplements  - Order, calculate, and assess calorie counts as needed  - Recommend, monitor, and adjust tube feedings and TPN/PPN based on assessed needs  - Assess need for intravenous fluids  - Provide specific nutrition/hydration education as appropriate  - Include patient/family/caregiver in decisions related to nutrition  Outcome: Progressing     Problem: PAIN - ADULT  Goal: Verbalizes/displays adequate comfort level or baseline comfort level  Description  Interventions:  - Encourage patient to monitor pain and request assistance  - Assess pain using appropriate pain scale  - Administer analgesics based on type and severity of pain and evaluate response  - Implement non-pharmacological measures as appropriate and evaluate response  - Consider cultural and social influences on pain and pain management  - Notify physician/advanced practitioner if interventions unsuccessful or patient reports new pain  Outcome: Progressing     Problem: SAFETY ADULT  Goal: Maintain or return to baseline ADL function  Description  INTERVENTIONS:  -  Assess patient's ability to carry out ADLs; assess patient's baseline for ADL function and identify physical deficits which impact ability to perform ADLs (bathing, care of mouth/teeth, toileting, grooming, dressing, etc )  - Assess/evaluate cause of self-care deficits   - Assess range of motion  - Assess patient's mobility; develop plan if impaired  - Assess patient's need for assistive devices and provide as appropriate  - Encourage maximum independence but intervene and supervise when necessary  - Involve family in performance of ADLs  - Assess for home care needs following discharge   - Consider OT consult to assist with ADL evaluation and planning for discharge  - Provide patient education as appropriate  Outcome: Progressing  Goal: Maintain or return mobility status to optimal level  Description  INTERVENTIONS:  - Assess patient's baseline mobility status (ambulation, transfers, stairs, etc )    - Identify cognitive and physical deficits and behaviors that affect mobility  - Identify mobility aids required to assist with transfers and/or ambulation (gait belt, sit-to-stand, lift, walker, cane, etc )  - [place] fall precautions as indicated by assessment  - Record patient progress and toleration of activity level on Mobility SBAR; progress patient to next Phase/Stage  - Instruct patient to call for assistance with activity based on assessment  - Consider rehabilitation consult to assist with strengthening/weightbearing, etc   Outcome: Progressing     Problem: DISCHARGE PLANNING  Goal: Discharge to home or other facility with appropriate resources  Description  INTERVENTIONS:  - Identify barriers to discharge w/patient and caregiver  - Arrange for needed discharge resources and transportation as appropriate  - Identify discharge learning needs (meds, wound care, etc )  - Arrange for interpretive services to assist at discharge as needed  - Refer to Case Management Department for coordinating discharge planning if the patient needs post-hospital services based on physician/advanced practitioner order or complex needs related to functional status, cognitive ability, or social support system  Outcome: Progressing     Problem: Knowledge Deficit  Goal: Patient/family/caregiver demonstrates understanding of disease process, treatment plan, medications, and discharge instructions  Description  Complete learning assessment and assess knowledge base    Interventions:  - Provide teaching at level of understanding  - Provide teaching via preferred learning methods  Outcome: Progressing     Problem: INFECTION - ADULT  Goal: Absence or prevention of progression during hospitalization  Description  INTERVENTIONS:  - Assess and monitor for signs and symptoms of infection  - Monitor lab/diagnostic results  - Monitor all insertion sites, i e  indwelling lines, tubes, and drains  - Monitor endotracheal if appropriate and nasal secretions for changes in amount and color  - [place] appropriate cooling/warming therapies per order  - Administer medications as ordered  - Instruct and encourage patient and family to use good hand hygiene technique  - Identify and instruct in appropriate isolation precautions for identified infection/condition  Outcome: Progressing     Problem: RESPIRATORY - ADULT  Goal: Achieves optimal ventilation and oxygenation  Description  INTERVENTIONS:  - Assess for changes in respiratory status  - Assess for changes in mentation and behavior  - Position to facilitate oxygenation and minimize respiratory effort  - Oxygen administered by appropriate delivery if ordered  - Initiate smoking cessation education as indicated  - Encourage broncho-pulmonary hygiene including cough, deep breathe, Incentive Spirometry  - Assess the need for suctioning and aspirate as needed  - Assess and instruct to report SOB or any respiratory difficulty  - Respiratory Therapy support as indicated  Outcome: Progressing     Problem: MUSCULOSKELETAL - ADULT  Goal: Maintain or return mobility to safest level of function  Description  INTERVENTIONS:  - Assess patient's ability to carry out ADLs; assess patient's baseline for ADL function and identify physical deficits which impact ability to perform ADLs (bathing, care of mouth/teeth, toileting, grooming, dressing, etc )  - Assess/evaluate cause of self-care deficits   - Assess range of motion  - Assess patient's mobility  - Assess patient's need for assistive devices and provide as appropriate  - Encourage maximum independence but intervene and supervise when necessary  - Involve family in performance of ADLs  - Assess for home care needs following discharge   - Consider OT consult to assist with ADL evaluation and planning for discharge  - Provide patient education as appropriate  Outcome: Progressing  Goal: Maintain proper alignment of affected body part  Description  INTERVENTIONS:  - Support, maintain and protect limb and body alignment  - Provide patient/ family with appropriate education  Outcome: Progressing     Problem: Prexisting or High Potential for Compromised Skin Integrity  Goal: Skin integrity is maintained or improved  Description  INTERVENTIONS:  - Identify patients at risk for skin breakdown  - Assess and monitor skin integrity  - Assess and monitor nutrition and hydration status  - Monitor labs   - Assess for incontinence   - Turn and reposition patient  - Assist with mobility/ambulation  - Relieve pressure over bony prominences  - Avoid friction and shearing  - Provide appropriate hygiene as needed including keeping skin clean and dry  - Evaluate need for skin moisturizer/barrier cream  - Collaborate with interdisciplinary team   - Patient/family teaching  - Consider wound care consult   Outcome: Progressing

## 2019-11-19 NOTE — PROGRESS NOTES
Progress Note - Ellis Gutierrez Dornsife 6/10/1921, 80 y o  female MRN: [id number]    Unit/Bed#: [address] [place] [address] Encounter: [id number]    Primary Care Provider: Terri Liriano MD   Date and time admitted to hospital: 11/17/2019 12:16 PM        Encephalopathy, metabolic  Assessment & Plan  Patient was confused this morning not recognizing family members  She has acute metabolic encephalopathy due to pneumonia, sepsis  - metabolic encephalopathy resolved, patient is able to converse with her daughter    Hypercalcemia  Assessment & Plan  Patient's calcium was normal earlier this week  Patient presented with elevated calcium 11 9  Status post IV fluid hydration  [facility] daily    Hyponatremia  Assessment & Plan  Patient has mild hyponatremia:  Sodium is 134  Will hydrate her with normal saline solution and will follow sodium levels  Sodium levels normalized    Sepsis ([facility] [address] )  Assessment & Plan  Patient meets criteria for sepsis as evidenced by heart of 106, respiratory rate of 32, leukocyte count of 20,000 with left shift  Lactic acid level is normal   Sepsis most likely due to pneumonia but since patient has new onset urine incontinence will get UA to rule out UTI as well  Continue with intravenous antibiotics  Follow blood culture results    Mild intermittent asthma without complication  Assessment & Plan  History of asthma, presented with mild expiratory wheezing on exam   Currently on DuoNeb and Pulmicort  Continue current plan  No longer wheezing     Essential hypertension  Assessment & Plan  Patient's blood pressure was uncontrolled on presentation to the ER with systolic blood pressure of more than 180 which has improved since then to systolic blood pressures in the 140s      Blood pressure stable on losartan and metoprolol    * Pneumonia  Assessment & Plan  Left lower lobe pneumonia, present on admission, as evidenced by productive cough, leukocytosis and tachycardia  Currently on IV cefepime and vancomycin given recent hospitalization  Screen for MRSA, follow results  Continue with pureed diet  Blood cultures negative till date   Follow sputum culture results  Follow-up urine Legionella and Streptococcus pneumoniae results      VTE Pharmacologic Prophylaxis:   Pharmacologic: Enoxaparin (Lovenox)  Mechanical VTE Prophylaxis in Place: Yes    Patient Centered Rounds: I have performed bedside rounds with nursing staff today  Education and Discussions with Family / Patient:  Discussed with patient's daughter Meeta Davies    Time Spent for Care: 45 minutes  More than 50% of total time spent on counseling and coordination of care as described above  Current Length of Stay: 2 day(s)    Current Patient Status: Inpatient   Certification Statement: The patient will continue to require additional inpatient hospital stay due to Pneumonia    Discharge Plan:  Tomorrow, pending rehab placement    Code Status: Level 3 - DNAR and DNI      Subjective:   No overnight events  Patient usually sleeps till 3 pm and can eat afterwards with no issues    Objective:     Vitals:   Temp (24hrs), Av °F (36 7 °C), Min:97 8 °F (36 6 °C), Max:98 5 °F (36 9 °C)    Temp:  [97 8 °F (36 6 °C)-98 5 °F (36 9 °C)] 98 5 °F (36 9 °C)  HR:  [] 82  Resp:  [20-24] 23  BP: (134-150)/(60-75) 150/75  SpO2:  [97 %-99 %] 99 %  Body mass index is 20 06 kg/m²  Input and Output Summary (last 24 hours): Intake/Output Summary (Last 24 hours) at 2019 1305  Last data filed at 2019 1101  Gross per 24 hour   Intake 200 ml   Output    Net 200 ml       Physical Exam:     Physical Exam   Cardiovascular: Normal rate and regular rhythm  Murmur heard  Pulmonary/Chest: Breath sounds normal  No stridor  No respiratory distress  She has no wheezes  Abdominal: Soft  Bowel sounds are normal  She exhibits no distension  There is no tenderness  Musculoskeletal: Normal range of motion  She exhibits no edema, tenderness or deformity     Neurological: Sleeping but rousable   Skin: Skin is warm  No erythema  Additional Data:     Labs:    Results from last 7 days   Lab Units 11/18/19  1322 11/18/19  0525  11/17/19  1232   WBC Thousand/uL  --  13 24*  --  20 56*   HEMOGLOBIN g/dL 12 9 11 4*  --  14 4   HEMATOCRIT % 40 3 34 8  --  44 5   PLATELETS Thousands/uL  --  226   < > 261   NEUTROS PCT %  --   --   --  82*   LYMPHS PCT %  --   --   --  8*   MONOS PCT %  --   --   --  9   EOS PCT %  --   --   --  0    < > = values in this interval not displayed  Results from last 7 days   Lab Units 11/18/19  0525 11/17/19  1232   SODIUM mmol/L 135* 134*   POTASSIUM mmol/L 3 6 3 7   CHLORIDE mmol/L 102 99*   CO2 mmol/L 29 32   BUN mg/dL 21 19   CREATININE mg/dL 0 76 0 97   ANION GAP mmol/L 4 3*   CALCIUM mg/dL 9 5 10 6*   ALBUMIN g/dL  --  2 8*   TOTAL BILIRUBIN mg/dL  --  1 00   ALK PHOS U/L  --  66   ALT U/L  --  23   AST U/L  --  26   GLUCOSE RANDOM mg/dL 109 124     Results from last 7 days   Lab Units 11/17/19  1331   INR  1 20*             Results from last 7 days   Lab Units 11/18/19  0525 11/17/19  1232   LACTIC ACID mmol/L  --  1 5   PROCALCITONIN ng/ml 0 36*  --            * I Have Reviewed All Lab Data Listed Above  * Additional Pertinent Lab Tests Reviewed: All Labs Within Last 24 Hours Reviewed        Recent Cultures (last 7 days):     Results from last 7 days   Lab Units 11/17/19  1851 11/17/19  1529 11/17/19  1232   BLOOD CULTURE   --   --  No Growth at 24 hrs  No Growth at 24 hrs  SPUTUM CULTURE   --  3+ Growth of   Commensal respiratory mery only; No significant growth of Staph aureus/MRSA or Pseudomonas aeruginosa    --    GRAM STAIN RESULT   --  1+ Epithelial cells per low power field*  4+ Disintegrating polys*  2+ Gram positive cocci in pairs, chains and clusters*  2+ Gram negative rods*  --    LEGIONELLA URINARY ANTIGEN  Negative  --   --        Last 24 Hours Medication List:     Current Facility-Administered Medications:  acetaminophen 650 mg Oral Q6H PRN Shira Calvert MD    albuterol 2 5 mg Nebulization Q4H PRN Shira Calvert MD    brinzolamide 1 drop Both Eyes TID Shira Calvert MD    cefepime 2,000 mg Intravenous Q24H Shira Calvert MD Last Rate: 2,000 mg (11/18/19 1440)   enoxaparin 30 mg Subcutaneous Daily Shira Calvert MD    losartan 100 mg Oral Daily Shira Calvert MD    metoprolol succinate 50 mg Oral Daily Shira Calvert MD    ondansetron 4 mg Intravenous Q6H PRN Shira Calvert MD    PARoxetine 20 mg Oral Daily Shira Calvert MD    polyethylene glycol 17 g Oral Daily PRN TRENT Cobb    senna-docusate sodium 1 tablet Oral HS Rina Hebert MD    vancomycin 15 mg/kg Intravenous Q24H Shira Calvert MD Last Rate: 750 mg (11/18/19 1525)        Today, Patient Was Seen By: Rina Hebert MD    ** Please Note: Dictation voice to text software may have been used in the creation of this document   **

## 2019-11-19 NOTE — OCCUPATIONAL THERAPY NOTE
Occupational Therapy Treatment Note     Patient Name: Tiago Springer  [facility] Date: 11/19/2019  Problem List  Principal Problem:    Pneumonia  Active Problems:    Essential hypertension    Mild intermittent asthma without complication    Sepsis ([facility] [address] )    Hyponatremia    Hypercalcemia    Encephalopathy, metabolic       12/20/92 3472   Restrictions/Precautions   Weight Bearing Precautions Per Order No   Other Precautions Cognitive; Chair Alarm; Fall Risk;Hard of hearing   Pain Assessment   Pain Assessment No/denies pain   Pain Score No Pain   ADL   Grooming Assistance 4  Minimal Assistance   Grooming Deficit Verbal cueing;Wash/dry face   Toileting Assistance  2  Maximal Assistance   Toileting Deficit Clothing management up;Clothing management down;Perineal hygiene; Bedside commode;Verbal cueing;Steadying;Supervison/safety   Functional Standing Tolerance   Time Pt able to maintain standing balance with mod A x 1 while receiving toileting assistance  Bed Mobility   Supine to Sit Unable to assess  (Pt received sitting in recliner chair  )   Sit to Supine Unable to assess  (Pt remained seated in recliner chair by end of session)   Transfers   Sit to Stand 3  Moderate assistance  (handheld assistance provided)   Additional items Assist x 2;Armrests; Verbal cues   Stand to Sit 3  Moderate assistance  (handheld assistance provided)   Additional items Assist x 2;Verbal cues;Armrests   Toilet transfer 3  Moderate assistance   Additional items Assist x 2;Verbal cues; Commode;Armrests  (handheld assistance provided)   Cognition   Overall Cognitive Status Impaired   Arousal/Participation Alert; Cooperative   Attention Attends with cues to redirect   Orientation Level Oriented to person;Disoriented to place; Disoriented to time;Disoriented to situation   Memory Decreased recall of biographical information;Decreased short term memory;Decreased recall of recent events   Following Commands Follows one step commands with increased time or repetition   Assessment   Assessment Patient participated in Skilled OT session this date with interventions consisting of ADL re training with the use of correct body mechnaics, safety awareness and fall prevention techniques,  therapeutic activities to: increase activity tolerance and increase dynamic sit/ stand balance during functional activity    Patient agreeable to OT treatment session, upon arrival patient was found seated OOB to Recliner  Pt increasingly alert and engaged during sessions  Treatment session as follows: performing sit<>stand transfers from chair/commode, toileting task requiring mod A x 1 to maintain balance and max A x 1 for toileting/hygiene care  Patient continues to be functioning below baseline level, occupational performance remains limited secondary to factors listed above and increased risk for falls and injury  From OT standpoint, recommendation at time of d/c would be Short Term Rehab  Patient to benefit from continued Occupational Therapy treatment while in the hospital to address deficits as defined above and maximize level of functional independence with ADLs and functional mobility  Pt left in bedside recliner with call bell in reach, tray table in reach, needs met, chair alarm activated, daughter present  Plan   Treatment Interventions ADL retraining;Functional transfer training;UE strengthening/ROM; Endurance training;Cognitive reorientation;Patient/family training;Equipment evaluation/education; Compensatory technique education; Energy conservation   Goal Expiration Date 11/28/19   OT Treatment Day 1   OT Frequency 2-3x/wk   Recommendation   OT Discharge Recommendation Short Term Rehab   OT - OK to Discharge Yes  (Once cleared by MD)          Sobia Vaca OTR/L

## 2019-11-19 NOTE — ASSESSMENT & PLAN NOTE
Patient has mild hyponatremia:  Sodium is 134   Will hydrate her with normal saline solution and will follow sodium levels  Sodium levels normalized

## 2019-11-19 NOTE — ASSESSMENT & PLAN NOTE
Patient was confused this morning not recognizing family members    She has acute metabolic encephalopathy due to pneumonia, sepsis  - metabolic encephalopathy resolved, patient is able to converse with her daughter

## 2019-11-19 NOTE — PLAN OF CARE
Problem: PHYSICAL THERAPY ADULT  Goal: Performs mobility at highest level of function for planned discharge setting  See evaluation for individualized goals  Description  Treatment/Interventions: ADL retraining, Functional transfer training, LE strengthening/ROM, Therapeutic exercise, Endurance training, Cognitive reorientation, Patient/family training, Equipment eval/education, Bed mobility, Gait training, Spoke to nursing, Spoke to case management  Equipment Recommended: Mat Prader       See flowsheet documentation for full assessment, interventions and recommendations  Outcome: Progressing  Note:   Prognosis: Good  Problem List: Decreased strength, Decreased endurance, Impaired balance, Decreased mobility, Decreased coordination, Decreased safety awareness  Assessment: Pt presents with weakness adn imapried activity tolerance, balance trnasfers and gait after adm with pneumonia  Can benefit form skilled PT serices to regain safe idn functional mobility  Recomend shor tterm in-pt rehab atd/c to maximize functional recovery  Barriers to Discharge: Decreased caregiver support, Inaccessible home environment     Recommendation: Short-term skilled PT          See flowsheet documentation for full assessment

## 2019-11-19 NOTE — ASSESSMENT & PLAN NOTE
Patient's blood pressure was uncontrolled on presentation to the ER with systolic blood pressure of more than 180 which has improved since then to systolic blood pressures in the 140s      Blood pressure stable on losartan and metoprolol

## 2019-11-19 NOTE — PHYSICAL THERAPY NOTE
PT eval   11/19/19 1400   Note Type   Note type Eval only   Pain Assessment   Pain Assessment 0-10   Pain Score No Pain   Home Living   Type of Home House   Home Layout Able to live on main level with bedroom/bathroom   Home Equipment Walker   Additional Comments recent [address] and d/c'd home with dghter to 1st floor set-up, rw commode beacme weak at home adn readmitted dx= pneumonia   Prior Function   Level of [place] Needs assistance with IADLs; Needs assistance with ADLs and functional mobility   Lives With Daughter   Receives Help From Family   ADL Assistance Needs assistance   IADLs Needs assistance   Vocational Retired   General   Additional Pertinent History adm for pneumonia/weakness   Family/Caregiver Present Yes   Cognition   Overall Cognitive Status WFL   Arousal/Participation Alert   Attention Within functional limits   Orientation Level Oriented to person;Oriented to place   Memory Within functional limits   Following Commands Follows one step commands with increased time or repetition   Comments Solomon   RUE Assessment   RUE Assessment WFL   LUE Assessment   LUE Assessment WFL   RLE Assessment   RLE Assessment WNL  (stregnth 3+/5)   LLE Assessment   LLE Assessment WNL  (stregnth 3+/5)   Coordination   Movements are Fluid and Coordinated 1   Proprioception   RLE Proprioception Grossly intact   LLE Proprioception Grossly Intact   Transfers   Sit to Stand 3  Moderate assistance   Additional items Assist x 2   Stand to Sit 3  Moderate assistance   Additional items Assist x 2   Stand pivot 3  Moderate assistance   Additional items Assist x 2  (armhold asssit)   Ambulation/Elevation   Gait pattern Improper Weight shift;Narrow MARIELLA; Forward Flexion; Shuffling; Inconsistent kendy; Short stride; Step to   Gait Assistance 3  Moderate assist   Additional items Assist x 2   Assistive Device   (armhold assist)   Distance 5'x2   Balance   Static Sitting Good   Dynamic Sitting Fair   Static Standing Fair -   Dynamic Standing Fair -   Ambulatory Poor +   Endurance Deficit   Endurance Deficit Yes   Endurance Deficit Description tires easily and 02 sat 89% on rom air 02 2L reapplied   Activity Tolerance   Activity Tolerance Patient limited by fatigue   Assessment   Prognosis Good   Problem List Decreased strength;Decreased endurance; Impaired balance;Decreased mobility; Decreased coordination;Decreased safety awareness   Assessment Pt presents with weakness adn imapried activity tolerance, balance trnasfers and gait after adm with pneumonia  Can benefit form skilled PT serices to regain safe idn functional mobility  Recomend shor tterm in-pt rehab atd/c to maximize functional recovery   Barriers to Discharge Decreased caregiver support; Inaccessible home environment   Goals   Patient Goals get better   STG Expiration Date 11/30/19   Short Term Goal #1 1   Short Term Goal #2 1) safe ind transfers with rw 2) safe abm with rw 25' level 3) imrpove strength 1/2 grade     Plan   Treatment/Interventions ADL retraining;Functional transfer training;LE strengthening/ROM; Therapeutic exercise; Endurance training;Cognitive reorientation;Patient/family training;Equipment eval/education; Bed mobility;Gait training;Spoke to nursing;Spoke to case management   PT Frequency 5x/wk   Recommendation   Recommendation Short-term skilled PT   Equipment Recommended Walker   Barthel Index   Feeding 5   Bathing 0   Grooming Score 0   Dressing Score 5   Bladder Score 10   Bowels Score 10   Toilet Use Score 5   Transfers (Bed/Chair) Score 5   Mobility (Level Surface) Score 0   Stairs Score 0   Barthel Index Score 40   Meka Nguyen, PT

## 2019-11-20 VITALS
RESPIRATION RATE: 18 BRPM | TEMPERATURE: 98.4 F | HEART RATE: 74 BPM | OXYGEN SATURATION: 98 % | WEIGHT: 116.84 LBS | DIASTOLIC BLOOD PRESSURE: 64 MMHG | BODY MASS INDEX: 19.95 KG/M2 | HEIGHT: 64 IN | SYSTOLIC BLOOD PRESSURE: 135 MMHG

## 2019-11-20 LAB
ALBUMIN SERPL BCP-MCNC: 2.1 G/DL (ref 3.5–5)
ALP SERPL-CCNC: 53 U/L (ref 46–116)
ALT SERPL W P-5'-P-CCNC: 20 U/L (ref 12–78)
ANION GAP SERPL CALCULATED.3IONS-SCNC: 1 MMOL/L (ref 4–13)
AST SERPL W P-5'-P-CCNC: 18 U/L (ref 5–45)
BILIRUB SERPL-MCNC: 0.5 MG/DL (ref 0.2–1)
BUN SERPL-MCNC: 22 MG/DL (ref 5–25)
CALCIUM SERPL-MCNC: 9.7 MG/DL (ref 8.3–10.1)
CHLORIDE SERPL-SCNC: 104 MMOL/L (ref 100–108)
CO2 SERPL-SCNC: 31 MMOL/L (ref 21–32)
CREAT SERPL-MCNC: 0.69 MG/DL (ref 0.6–1.3)
ERYTHROCYTE [DISTWIDTH] IN BLOOD BY AUTOMATED COUNT: 11.7 % (ref 11.6–15.1)
GFR SERPL CREATININE-BSD FRML MDRD: 73 ML/MIN/1.73SQ M
GLUCOSE SERPL-MCNC: 89 MG/DL (ref 65–140)
HCT VFR BLD AUTO: 36.6 % (ref 34.8–46.1)
HGB BLD-MCNC: 11.6 G/DL (ref 11.5–15.4)
MCH RBC QN AUTO: 32 PG (ref 26.8–34.3)
MCHC RBC AUTO-ENTMCNC: 31.7 G/DL (ref 31.4–37.4)
MCV RBC AUTO: 101 FL (ref 82–98)
PLATELET # BLD AUTO: 286 THOUSANDS/UL (ref 149–390)
PMV BLD AUTO: 10 FL (ref 8.9–12.7)
POTASSIUM SERPL-SCNC: 3.6 MMOL/L (ref 3.5–5.3)
PROT SERPL-MCNC: 5.9 G/DL (ref 6.4–8.2)
RBC # BLD AUTO: 3.62 MILLION/UL (ref 3.81–5.12)
SODIUM SERPL-SCNC: 136 MMOL/L (ref 136–145)
WBC # BLD AUTO: 7.63 THOUSAND/UL (ref 4.31–10.16)

## 2019-11-20 PROCEDURE — 85027 COMPLETE CBC AUTOMATED: CPT | Performed by: INTERNAL MEDICINE

## 2019-11-20 PROCEDURE — 80053 COMPREHEN METABOLIC PANEL: CPT | Performed by: INTERNAL MEDICINE

## 2019-11-20 PROCEDURE — 94760 N-INVAS EAR/PLS OXIMETRY 1: CPT

## 2019-11-20 PROCEDURE — 99239 HOSP IP/OBS DSCHRG MGMT >30: CPT | Performed by: INTERNAL MEDICINE

## 2019-11-20 RX ORDER — AMOXICILLIN AND CLAVULANATE POTASSIUM 562.5; 437.5; 62.5 MG/1; MG/1; MG/1
2 TABLET, FILM COATED, EXTENDED RELEASE ORAL 2 TIMES DAILY
Qty: 16 TABLET | Refills: 0 | Status: SHIPPED | OUTPATIENT
Start: 2019-11-20 | End: 2019-11-24

## 2019-11-20 RX ADMIN — METOPROLOL SUCCINATE 50 MG: 50 TABLET, EXTENDED RELEASE ORAL at 12:12

## 2019-11-20 RX ADMIN — LOSARTAN POTASSIUM 100 MG: 50 TABLET, FILM COATED ORAL at 12:11

## 2019-11-20 RX ADMIN — ENOXAPARIN SODIUM 30 MG: 30 INJECTION SUBCUTANEOUS at 12:10

## 2019-11-20 RX ADMIN — PAROXETINE 20 MG: 20 TABLET, FILM COATED ORAL at 12:12

## 2019-11-20 RX ADMIN — CEFTRIAXONE 1000 MG: 1 INJECTION, SOLUTION INTRAVENOUS at 12:06

## 2019-11-20 NOTE — TRANSPORTATION MEDICAL NECESSITY
Section I - General Information    Name of Patient: Syed Beckman                 : 6/10/1921    Medicare #: [id number]  Transport Date: 19 (PCS is valid for round trips on this date and for all repetitive trips in the 60-day range as noted below )  Origin: [facility] 2: [address] [facility]  Is the pt's stay covered under Medicare Part A (PPS/DRG)   []     Closest appropriate facility? If no, why is transport to more distant facility required? YES  If hospice pt, is this transport related to pt's terminal illness? N?A      Section II - Medical Necessity Questionnaire  Ambulance transportation is medically necessary only if other means of transport are contraindicated or would be potentially harmful to the patient  To meet this requirement, the patient must either be "bed confined" or suffer from a condition such that transport by means other than ambulance is contraindicated by the patient's condition  The following questions must be answered by the medical professional signing below for this form to be valid:    1)  Describe the MEDICAL CONDITION (physical and/or mental) of this patient AT [address] [place] [address] that requires the patient to be transported in an ambulance and why transport by other means is contraindicated by the patient's condition: patient with history of dementia, confusion, poor safety awareness, history of multiple falls, continuous O2 at 2l nc    2) Is the patient "bed confined" as defined below? NO  To be "be confined" the patient must satisfy all three of the following conditions: (1) unable to get up from bed without Assistance; AND (2) unable to ambulate; AND (3) unable to sit in a chair or wheelchair  3) Can this patient safely be transported by car or wheelchair van (i e , seated during transport without a medical attendant or monitoring)?    NO    4) In addition to completing questions 1-3 above, please check any of the following conditions that apply*:   *Note: supporting documentation for any boxes checked must be maintained in the patient's medical records  If hosp-hosp transfer, describe services needed at 2nd facility not available at 1st facility? Patient is confused  Requires oxygen-unable to self administer      Section III - Signature of Physician or Healthcare Professional  I certify that the above information is true and correct based on my evaluation of this patient, and represent that the patient requires transport by ambulance and that other forms of transport are contraindicated  I understand that this information will be used by the Centers for Medicare and Medicaid Services (CMS) to support the determination of medical necessity for ambulance services, and I represent that I have personal knowledge of the patient's condition at time of transport  []  If this box is checked, I also certify that the patient is physically or mentally incapable of signing the ambulance service's claim and that the institution with which I am affiliated has furnished care, services, or assistance to the patient  My signature below is made on behalf of the patient pursuant to 42 CFR §424 36(b)(4)  In accordance with 42 CFR §424 37, the specific reason(s) that the patient is physically or mentally incapable of signing the claim form is as follows:       Signature of Physician* or Healthcare Professional______________________________________________________________  Signature Date 11/20/19 (For scheduled repetitive transports, this form is not valid for transports performed more than 60 days after this date)    Printed Name & Credentials of Physician or Healthcare Professional (MD, DO, RN, etc )____Sandra Warren____________________________  *Form must be signed by patient's attending physician for scheduled, repetitive transports   For non-repetitive, unscheduled ambulance transports, if unable to obtain the signature of the attending physician, any of the following may sign (choose appropriate option below)  [] Physician Assistant []  Clinical Nurse Specialist []  Registered Nurse  []  Nurse Practitioner  [] Discharge Planner

## 2019-11-20 NOTE — ASSESSMENT & PLAN NOTE
Patient's calcium was normal earlier this week    Patient presented with elevated calcium 11 9  Status post IV fluid hydration  Hypercalcemia resolved, was most likely elevated secondary to dehydration

## 2019-11-20 NOTE — DISCHARGE SUMMARY
Discharge- Tiago Gian 6/10/1921, 80 y o  female MRN: [id number]    Unit/Bed#: [address] [place] [address] Encounter: [id number]    Primary Care Provider: Yadira Bryson MD   Date and time admitted to hospital: 11/17/2019 12:16 PM        Encephalopathy, metabolic  Assessment & Plan  Patient was confused this morning not recognizing family members  She has acute metabolic encephalopathy due to pneumonia, sepsis  - metabolic encephalopathy resolved, patient is able to converse with her daughter    Hypercalcemia  Assessment & Plan  Patient's calcium was normal earlier this week  Patient presented with elevated calcium 11 9  Status post IV fluid hydration  Hypercalcemia resolved, was most likely elevated secondary to dehydration    Hyponatremia  Assessment & Plan  Patient has mild hyponatremia:  Sodium is 134  Will hydrate her with normal saline solution and will follow sodium levels  Sodium levels normalized    Sepsis ([facility] [address] )  Assessment & Plan  Patient meets criteria for sepsis as evidenced by heart of 106, respiratory rate of 32, leukocyte count of 20,000 with left shift  Source pneumonia  Medically stable for discharge  Can discharge on oral Augmentin to complete a 7 day course    Mild intermittent asthma without complication  Assessment & Plan  History of asthma, presented with mild expiratory wheezing on exam   Currently on DuoNeb and Pulmicort  Continue current plan  No longer wheezing     Essential hypertension  Assessment & Plan  Patient's blood pressure was uncontrolled on presentation to the ER with systolic blood pressure > 180 which has improved since then to systolic blood pressures in the 140s      Blood pressure stable on losartan and metoprolol    * Pneumonia  Assessment & Plan  Left lower lobe pneumonia, present on admission, as evidenced by productive cough, leukocytosis and tachycardia  Currently on IV cefepime and vancomycin given recent hospitalization, Day 3 today  Medically stable for discharge  Discharge on Augmentin 1gm BID x 4 days to complete a 7 day course  Continue with pureed diet  Blood cultures negative till date,  Sputum cultures growing polymicrobial organisms   urine Legionella and Streptococcus pneumoniae results negative          Discharging Physician / Practitioner: Lulu Rich MD  PCP: Juan Resendiz MD  Admission Date:   Admission Orders (From admission, onward)     Ordered        11/17/19 1403  Inpatient Admission (expected length of stay for this patient Order details is greater than two midnights)  Once                   Discharge Date: 11/20/19    Resolved Problems  Date Reviewed: 11/20/2019    None          Consultations During Hospital Stay:  · None    Procedures Performed:   · None    Significant Findings / Test Results:   · Chest x-ray      FINDINGS:    Cardiomediastinal silhouette appears unremarkable  Patchy density seen left lower lung    S-shaped scoliosis thoracic spine   Impression:       Patchy density seen left lower lung suggest infiltrate   Follow-up suggested in 6-8 weeks to demonstrate resolution         Incidental Findings:   · None     Test Results Pending at Discharge (will require follow up): · None     Outpatient Tests Requested:  · Chest x-ray in 6-8 weeks    Complications:  None    Reason for Admission:  Weakness, confusion and cough    Hospital Course:     Landon Fabry is a 80 y o  female patient who originally presented to the hospital on 11/17/2019 due to weakness, cough and was found to septic secondary to pneumonia  Patient was treated with intravenous vancomycin and ceftriaxone and she improved  Patient was evaluated by Physical therapy and they recommended inpatient rehab  She is being discharged to rehab    Please see above list of diagnoses and related plan for additional information  Condition at Discharge: stable     Discharge Day Visit / Exam:     Subjective:  No overnight events    Patient is sleeping comfortably this morning  Has no complaints    Vitals: Blood Pressure: (!) 174/79 (11/20/19 0805)  Pulse: 105 (11/20/19 0805)  Temperature: 98 6 °F (37 °C) (11/20/19 0805)  Temp Source: Axillary (11/20/19 0805)  Respirations: 16 (11/20/19 0805)  Height: 5' 4" (162 6 cm) (11/17/19 1444)  Weight - Scale: 53 kg (116 lb 13 5 oz) (11/17/19 1444)  SpO2: 96 % (11/20/19 0842)  Exam:   Physical Exam   Constitutional: She is oriented to person, place, and time  No distress  Frail female sleeping comfortably not in acute distress   Cardiovascular: Normal rate, regular rhythm and intact distal pulses  Exam reveals no gallop and no friction rub  Pulmonary/Chest: Effort normal and breath sounds normal  No stridor  No respiratory distress  She has no wheezes  She has no rales  Abdominal: Soft  Bowel sounds are normal  She exhibits no distension and no mass  There is no tenderness  There is no guarding  Musculoskeletal: Normal range of motion  She exhibits no edema, tenderness or deformity  Neurological: She is alert and oriented to person, place, and time  Skin: Skin is warm  She is not diaphoretic  No erythema  Discussion with Family:  Discussed with patient's daughter at bedside    Discharge instructions/Information to patient and family:   See after visit summary for information provided to patient and family  Provisions for Follow-Up Care:  See after visit summary for information related to follow-up care and any pertinent home health orders  Disposition:     Acute Rehab at [address] [place] [address],[address] Floor to [facility] SNF:   · [facility] - Not Applicable to this Patient    Planned Readmission:  No     Discharge Statement:  I spent 40 minutes discharging the patient  This time was spent on the day of discharge  I had direct contact with the patient on the day of discharge   Greater than 50% of the total time was spent examining patient, answering all patient questions, arranging and discussing plan of care with patient as well as directly providing post-discharge instructions  Additional time then spent on discharge activities  Discharge Medications:  See after visit summary for reconciled discharge medications provided to patient and family        ** Please Note: This note has been constructed using a voice recognition system **

## 2019-11-20 NOTE — SOCIAL WORK
Spoke with patients daughter Leonel Hester re: discharge plan  She has toured [facility] and is agreeable to her mother going there  [facility] to transport patient to [facility] at [facility]  Called and notified Demar Castro at [facility]  Called and notified patients daughter Leonel Hester of discharge time

## 2019-11-20 NOTE — SPEECH THERAPY NOTE
Attempted to see pt for dysphagia tx  Pt's breakfast tray was in the room  Pt's daughter was present, pt was sleeping soundly  Spoke with patient's daughter, explained the role of ST and dysphagia, and asked it pt could be woken up to eat  Pt's daughter refused, due to her mother being tired, and that she typically does not wake up until after noon  Her daughter did say that she typically feeds her Mom, and that she was tolerating the pureed diet and thin liquids well  RN aware that daughter does not want to arouse patient  Will attempt to follow up again for tx when able

## 2019-11-20 NOTE — ASSESSMENT & PLAN NOTE
Patient meets criteria for sepsis as evidenced by heart of 106, respiratory rate of 32, leukocyte count of 20,000 with left shift    Source pneumonia  Medically stable for discharge  Can discharge on oral Augmentin to complete a 7 day course

## 2019-11-20 NOTE — DISCHARGE INSTRUCTIONS
Bacterial Pneumonia   WHAT YOU NEED TO KNOW:   Bacterial pneumonia is a lung infection caused by bacteria  It makes your lungs inflamed, which means they cannot work well  Bacterial pneumonia germs are easily spread when an infected person coughs, sneezes, or has close contact with others  DISCHARGE INSTRUCTIONS:   Seek care immediately if:   · You are confused and cannot think clearly  · You are urinating less or not at all  · You cough up blood  · You have more trouble breathing, or your breathing seems faster than normal     · Your heart or pulse beats more than 100 times in 1 minute  · Your lips or fingernails turn blue  Contact your healthcare provider if:   · Your symptoms are the same or get worse 48 hours after you start antibiotics  · You cannot eat or have loss of appetite, nausea, or are vomiting  · You have questions or concerns about your condition or care  Medicines:   · Antibiotics  treat pneumonia caused by bacteria  · Acetaminophen  decreases pain and fever  It is available without a doctor's order  Ask how much to take and how often to take it  Follow directions  Read the labels of all other medicines you are using to see if they also contain acetaminophen, or ask your doctor or pharmacist  Acetaminophen can cause liver damage if not taken correctly  Do not use more than 4 grams (4,000 milligrams) total of acetaminophen in one day  · NSAIDs , such as ibuprofen, help decrease swelling, pain, and fever  This medicine is available with or without a doctor's order  NSAIDs can cause stomach bleeding or kidney problems in certain people  If you take blood thinner medicine, always ask your healthcare provider if NSAIDs are safe for you  Always read the medicine label and follow directions  · Take your medicine as directed  Contact your healthcare provider if you think your medicine is not helping or if you have side effects   Tell him or her if you are allergic to any medicine  Keep a list of the medicines, vitamins, and herbs you take  Include the amounts, and when and why you take them  Bring the list or the pill bottles to follow-up visits  Carry your medicine list with you in case of an emergency  Follow up with your healthcare provider as directed:  Write down your questions so you remember to ask them during your visits  Manage your symptoms:   · Rest as needed  Rest often while you recover  Slowly start to do more each day  · Drink liquids as directed  Ask how much liquid to drink each day and which liquids are best for you  Liquids help thin your mucus, which may make it easier for you to cough it up  · Do not smoke  Avoid secondhand smoke  Smoking increases your risk for pneumonia  Smoking also makes it harder for you to get better after you have had pneumonia  Ask your healthcare provider for information if you currently smoke and need help to quit  E-cigarettes or smokeless tobacco still contain nicotine  Talk to your healthcare provider before you use these products  · Use a cool mist humidifier  to increase air moisture in your home  This may make it easier for you to breathe and help decrease your cough  · Keep your head elevated  You may be able to breathe better if you lie down with the head of your bed up  Prevent bacterial pneumonia:   · Prevent the spread of germs  Wash your hands often with soap and water  Use gel hand cleanser when there is no soap and water available  Do not touch your eyes, nose, or mouth unless you have washed your hands first  Cover your mouth when you cough  Cough into a tissue or your shirtsleeve so you do not spread germs from your hands  If you are sick, stay away from others as much as possible  · Limit alcohol  Women should limit alcohol to 1 drink a day  Men should limit alcohol to 2 drinks a day  A drink of alcohol is 12 ounces of beer, 5 ounces of wine, or 1½ ounces of liquor       · Ask about vaccines  You may need a vaccine to help prevent pneumonia  Get an influenza (flu) vaccine every year as soon as it becomes available  © 2017 [address] [employer] [employer] Information is for End User's use only and may not be sold, redistributed or otherwise used for commercial purposes  All illustrations and images included in CareNotes® are the copyrighted property of [employer] , [employer]  or Matty [employer]  The above information is an  only  It is not intended as medical advice for individual conditions or treatments  Talk to your doctor, nurse or pharmacist before following any medical regimen to see if it is safe and effective for you

## 2019-11-20 NOTE — PLAN OF CARE
Problem: Potential for Falls  Goal: Patient will remain free of falls  Description  INTERVENTIONS:  - Assess patient frequently for physical needs  -  Identify cognitive and physical deficits and behaviors that affect risk of falls  -  [place] fall precautions as indicated by assessment   - Educate patient/family on patient safety including physical limitations  - Instruct patient to call for assistance with activity based on assessment  - Modify environment to reduce risk of injury  - Consider OT/PT consult to assist with strengthening/mobility  Outcome: Adequate for Discharge     Problem: Nutrition/Hydration-ADULT  Goal: Nutrient/Hydration intake appropriate for improving, restoring or maintaining nutritional needs  Description  Monitor and assess patient's nutrition/hydration status for malnutrition  Collaborate with interdisciplinary team and initiate plan and interventions as ordered  Monitor patient's weight and dietary intake as ordered or per policy  Utilize nutrition screening tool and intervene as necessary  Determine patient's food preferences and provide high-protein, high-caloric foods as appropriate       INTERVENTIONS:  - Monitor oral intake, urinary output, labs, and treatment plans  - Assess nutrition and hydration status and recommend course of action  - Evaluate amount of meals eaten  - Assist patient with eating if necessary   - Allow adequate time for meals  - Recommend/ encourage appropriate diets, oral nutritional supplements, and vitamin/mineral supplements  - Order, calculate, and assess calorie counts as needed  - Recommend, monitor, and adjust tube feedings and TPN/PPN based on assessed needs  - Assess need for intravenous fluids  - Provide specific nutrition/hydration education as appropriate  - Include patient/family/caregiver in decisions related to nutrition  Outcome: Adequate for Discharge     Problem: PAIN - ADULT  Goal: Verbalizes/displays adequate comfort level or baseline comfort level  Description  Interventions:  - Encourage patient to monitor pain and request assistance  - Assess pain using appropriate pain scale  - Administer analgesics based on type and severity of pain and evaluate response  - Implement non-pharmacological measures as appropriate and evaluate response  - Consider cultural and social influences on pain and pain management  - Notify physician/advanced practitioner if interventions unsuccessful or patient reports new pain  Outcome: Adequate for Discharge     Problem: SAFETY ADULT  Goal: Maintain or return to baseline ADL function  Description  INTERVENTIONS:  -  Assess patient's ability to carry out ADLs; assess patient's baseline for ADL function and identify physical deficits which impact ability to perform ADLs (bathing, care of mouth/teeth, toileting, grooming, dressing, etc )  - Assess/evaluate cause of self-care deficits   - Assess range of motion  - Assess patient's mobility; develop plan if impaired  - Assess patient's need for assistive devices and provide as appropriate  - Encourage maximum independence but intervene and supervise when necessary  - Involve family in performance of ADLs  - Assess for home care needs following discharge   - Consider OT consult to assist with ADL evaluation and planning for discharge  - Provide patient education as appropriate  Outcome: Adequate for Discharge  Goal: Maintain or return mobility status to optimal level  Description  INTERVENTIONS:  - Assess patient's baseline mobility status (ambulation, transfers, stairs, etc )    - Identify cognitive and physical deficits and behaviors that affect mobility  - Identify mobility aids required to assist with transfers and/or ambulation (gait belt, sit-to-stand, lift, walker, cane, etc )  - [place] fall precautions as indicated by assessment  - Record patient progress and toleration of activity level on Mobility SBAR; progress patient to next Phase/Stage  - Instruct patient to call for assistance with activity based on assessment  - Consider rehabilitation consult to assist with strengthening/weightbearing, etc   Outcome: Adequate for Discharge     Problem: DISCHARGE PLANNING  Goal: Discharge to home or other facility with appropriate resources  Description  INTERVENTIONS:  - Identify barriers to discharge w/patient and caregiver  - Arrange for needed discharge resources and transportation as appropriate  - Identify discharge learning needs (meds, wound care, etc )  - Arrange for interpretive services to assist at discharge as needed  - Refer to Case Management Department for coordinating discharge planning if the patient needs post-hospital services based on physician/advanced practitioner order or complex needs related to functional status, cognitive ability, or social support system  Outcome: Adequate for Discharge     Problem: Knowledge Deficit  Goal: Patient/family/caregiver demonstrates understanding of disease process, treatment plan, medications, and discharge instructions  Description  Complete learning assessment and assess knowledge base    Interventions:  - Provide teaching at level of understanding  - Provide teaching via preferred learning methods  Outcome: Adequate for Discharge     Problem: INFECTION - ADULT  Goal: Absence or prevention of progression during hospitalization  Description  INTERVENTIONS:  - Assess and monitor for signs and symptoms of infection  - Monitor lab/diagnostic results  - Monitor all insertion sites, i e  indwelling lines, tubes, and drains  - Monitor endotracheal if appropriate and nasal secretions for changes in amount and color  - [place] appropriate cooling/warming therapies per order  - Administer medications as ordered  - Instruct and encourage patient and family to use good hand hygiene technique  - Identify and instruct in appropriate isolation precautions for identified infection/condition  Outcome: Adequate for Discharge     Problem: RESPIRATORY - ADULT  Goal: Achieves optimal ventilation and oxygenation  Description  INTERVENTIONS:  - Assess for changes in respiratory status  - Assess for changes in mentation and behavior  - Position to facilitate oxygenation and minimize respiratory effort  - Oxygen administered by appropriate delivery if ordered  - Initiate smoking cessation education as indicated  - Encourage broncho-pulmonary hygiene including cough, deep breathe, Incentive Spirometry  - Assess the need for suctioning and aspirate as needed  - Assess and instruct to report SOB or any respiratory difficulty  - Respiratory Therapy support as indicated  Outcome: Adequate for Discharge     Problem: MUSCULOSKELETAL - ADULT  Goal: Maintain or return mobility to safest level of function  Description  INTERVENTIONS:  - Assess patient's ability to carry out ADLs; assess patient's baseline for ADL function and identify physical deficits which impact ability to perform ADLs (bathing, care of mouth/teeth, toileting, grooming, dressing, etc )  - Assess/evaluate cause of self-care deficits   - Assess range of motion  - Assess patient's mobility  - Assess patient's need for assistive devices and provide as appropriate  - Encourage maximum independence but intervene and supervise when necessary  - Involve family in performance of ADLs  - Assess for home care needs following discharge   - Consider OT consult to assist with ADL evaluation and planning for discharge  - Provide patient education as appropriate  Outcome: Adequate for Discharge  Goal: Maintain proper alignment of affected body part  Description  INTERVENTIONS:  - Support, maintain and protect limb and body alignment  - Provide patient/ family with appropriate education  Outcome: Adequate for Discharge     Problem: Prexisting or High Potential for Compromised Skin Integrity  Goal: Skin integrity is maintained or improved  Description  INTERVENTIONS:  - Identify patients at risk for skin breakdown  - Assess and monitor skin integrity  - Assess and monitor nutrition and hydration status  - Monitor labs   - Assess for incontinence   - Turn and reposition patient  - Assist with mobility/ambulation  - Relieve pressure over bony prominences  - Avoid friction and shearing  - Provide appropriate hygiene as needed including keeping skin clean and dry  - Evaluate need for skin moisturizer/barrier cream  - Collaborate with interdisciplinary team   - Patient/family teaching  - Consider wound care consult   Outcome: Adequate for Discharge

## 2019-11-20 NOTE — ASSESSMENT & PLAN NOTE
Patient's blood pressure was uncontrolled on presentation to the ER with systolic blood pressure > 180 which has improved since then to systolic blood pressures in the 140s      Blood pressure stable on losartan and metoprolol

## 2019-11-20 NOTE — ASSESSMENT & PLAN NOTE
Left lower lobe pneumonia, present on admission, as evidenced by productive cough, leukocytosis and tachycardia  Currently on IV cefepime and vancomycin given recent hospitalization, Day 3 today  Medically stable for discharge  Discharge on Augmentin 1gm BID x 4 days to complete a 7 day course  Continue with pureed diet  Blood cultures negative till date,  Sputum cultures growing polymicrobial organisms   urine Legionella and Streptococcus pneumoniae results negative

## 2019-11-21 ENCOUNTER — PATIENT OUTREACH (OUTPATIENT)
Dept: CASE MANAGEMENT | Facility: OTHER | Age: 84
End: 2019-11-21

## 2019-11-21 DIAGNOSIS — Z71.89 COMPLEX CARE COORDINATION: Primary | ICD-10-CM

## 2019-11-22 LAB
BACTERIA BLD CULT: NORMAL
BACTERIA BLD CULT: NORMAL

## 2019-11-25 ENCOUNTER — EPISODE CHANGES (OUTPATIENT)
Dept: CASE MANAGEMENT | Facility: OTHER | Age: 84
End: 2019-11-25

## 2019-11-25 DIAGNOSIS — I10 ESSENTIAL HYPERTENSION: ICD-10-CM

## 2019-11-26 ENCOUNTER — PATIENT OUTREACH (OUTPATIENT)
Dept: CASE MANAGEMENT | Facility: OTHER | Age: 84
End: 2019-11-26

## 2019-11-26 RX ORDER — METOPROLOL SUCCINATE 50 MG/1
TABLET, EXTENDED RELEASE ORAL
Qty: 90 TABLET | Refills: 4 | OUTPATIENT
Start: 2019-11-26

## 2019-11-26 NOTE — PROGRESS NOTES
In basket received that patient is a bundle episode patient as well as involved in the TREMAINE/CKD   Email sent to Ella Bradford, at [facility], updating on new bundle episode information, including DRG and ELOS  Informed him that patient is involved in a  and BMP will be needed prior patient's discharge  Bundle Communication Tool emailed as well and asked if this may be filled out prior to patient's discharge

## 2019-12-02 ENCOUNTER — PATIENT OUTREACH (OUTPATIENT)
Dept: CASE MANAGEMENT | Facility: OTHER | Age: 84
End: 2019-12-02

## 2019-12-02 NOTE — PROGRESS NOTES
Email received from Morgan Linton, at [facility], with communication form filled out  Bundle Communication Tool attached  Patient's tentative discharge date is 12/19/19 and the plan is for her to go home with her daughter  Will reach out to Morgan Linton prior to patient's discharge to ensure BMP was or will be drawn and obtain a Cr level

## 2019-12-08 ENCOUNTER — HOSPITAL ENCOUNTER (EMERGENCY)
Facility: HOSPITAL | Age: 84
Discharge: OTHER INSTITUTION | End: 2019-12-08
Attending: EMERGENCY MEDICINE | Admitting: EMERGENCY MEDICINE
Payer: MEDICARE

## 2019-12-08 ENCOUNTER — APPOINTMENT (EMERGENCY)
Dept: CT IMAGING | Facility: HOSPITAL | Age: 84
End: 2019-12-08
Payer: MEDICARE

## 2019-12-08 ENCOUNTER — APPOINTMENT (EMERGENCY)
Dept: RADIOLOGY | Facility: HOSPITAL | Age: 84
End: 2019-12-08
Payer: MEDICARE

## 2019-12-08 VITALS
TEMPERATURE: 97 F | DIASTOLIC BLOOD PRESSURE: 86 MMHG | OXYGEN SATURATION: 91 % | HEART RATE: 76 BPM | BODY MASS INDEX: 22.66 KG/M2 | HEIGHT: 64 IN | RESPIRATION RATE: 26 BRPM | WEIGHT: 132.72 LBS | SYSTOLIC BLOOD PRESSURE: 184 MMHG

## 2019-12-08 DIAGNOSIS — I10 ESSENTIAL HYPERTENSION: ICD-10-CM

## 2019-12-08 DIAGNOSIS — I10 ACCELERATED HYPERTENSION: Primary | ICD-10-CM

## 2019-12-08 DIAGNOSIS — N30.01 ACUTE CYSTITIS WITH HEMATURIA: ICD-10-CM

## 2019-12-08 LAB
ANION GAP SERPL CALCULATED.3IONS-SCNC: 5 MMOL/L (ref 4–13)
BACTERIA UR QL AUTO: ABNORMAL /HPF
BASOPHILS # BLD AUTO: 0.03 THOUSANDS/ΜL (ref 0–0.1)
BASOPHILS NFR BLD AUTO: 0 % (ref 0–1)
BILIRUB UR QL STRIP: NEGATIVE
BUN SERPL-MCNC: 12 MG/DL (ref 5–25)
CALCIUM SERPL-MCNC: 9.8 MG/DL (ref 8.3–10.1)
CHLORIDE SERPL-SCNC: 99 MMOL/L (ref 100–108)
CLARITY UR: ABNORMAL
CO2 SERPL-SCNC: 32 MMOL/L (ref 21–32)
COLOR UR: YELLOW
CREAT SERPL-MCNC: 0.71 MG/DL (ref 0.6–1.3)
EOSINOPHIL # BLD AUTO: 0.11 THOUSAND/ΜL (ref 0–0.61)
EOSINOPHIL NFR BLD AUTO: 1 % (ref 0–6)
ERYTHROCYTE [DISTWIDTH] IN BLOOD BY AUTOMATED COUNT: 12 % (ref 11.6–15.1)
GFR SERPL CREATININE-BSD FRML MDRD: 71 ML/MIN/1.73SQ M
GLUCOSE SERPL-MCNC: 102 MG/DL (ref 65–140)
GLUCOSE UR STRIP-MCNC: NEGATIVE MG/DL
HCT VFR BLD AUTO: 40.9 % (ref 34.8–46.1)
HGB BLD-MCNC: 13 G/DL (ref 11.5–15.4)
HGB UR QL STRIP.AUTO: ABNORMAL
IMM GRANULOCYTES # BLD AUTO: 0.05 THOUSAND/UL (ref 0–0.2)
IMM GRANULOCYTES NFR BLD AUTO: 0 % (ref 0–2)
KETONES UR STRIP-MCNC: NEGATIVE MG/DL
LEUKOCYTE ESTERASE UR QL STRIP: ABNORMAL
LYMPHOCYTES # BLD AUTO: 1.03 THOUSANDS/ΜL (ref 0.6–4.47)
LYMPHOCYTES NFR BLD AUTO: 6 % (ref 14–44)
MCH RBC QN AUTO: 31.5 PG (ref 26.8–34.3)
MCHC RBC AUTO-ENTMCNC: 31.8 G/DL (ref 31.4–37.4)
MCV RBC AUTO: 99 FL (ref 82–98)
MONOCYTES # BLD AUTO: 1.34 THOUSAND/ΜL (ref 0.17–1.22)
MONOCYTES NFR BLD AUTO: 7 % (ref 4–12)
NEUTROPHILS # BLD AUTO: 16.13 THOUSANDS/ΜL (ref 1.85–7.62)
NEUTS SEG NFR BLD AUTO: 86 % (ref 43–75)
NITRITE UR QL STRIP: NEGATIVE
NON-SQ EPI CELLS URNS QL MICRO: ABNORMAL /HPF
PH UR STRIP.AUTO: 8 [PH]
PLATELET # BLD AUTO: 447 THOUSANDS/UL (ref 149–390)
PMV BLD AUTO: 8.9 FL (ref 8.9–12.7)
POTASSIUM SERPL-SCNC: 3.7 MMOL/L (ref 3.5–5.3)
PROT UR STRIP-MCNC: ABNORMAL MG/DL
RBC # BLD AUTO: 4.13 MILLION/UL (ref 3.81–5.12)
RBC #/AREA URNS AUTO: ABNORMAL /HPF
SODIUM SERPL-SCNC: 136 MMOL/L (ref 136–145)
SP GR UR STRIP.AUTO: 1.02 (ref 1–1.03)
TROPONIN I SERPL-MCNC: <0.02 NG/ML
UROBILINOGEN UR QL STRIP.AUTO: 0.2 E.U./DL
WBC # BLD AUTO: 18.69 THOUSAND/UL (ref 4.31–10.16)
WBC #/AREA URNS AUTO: ABNORMAL /HPF

## 2019-12-08 PROCEDURE — 87086 URINE CULTURE/COLONY COUNT: CPT | Performed by: PHYSICIAN ASSISTANT

## 2019-12-08 PROCEDURE — 80048 BASIC METABOLIC PNL TOTAL CA: CPT | Performed by: PHYSICIAN ASSISTANT

## 2019-12-08 PROCEDURE — 99285 EMERGENCY DEPT VISIT HI MDM: CPT | Performed by: PHYSICIAN ASSISTANT

## 2019-12-08 PROCEDURE — 85025 COMPLETE CBC W/AUTO DIFF WBC: CPT | Performed by: PHYSICIAN ASSISTANT

## 2019-12-08 PROCEDURE — 84484 ASSAY OF TROPONIN QUANT: CPT | Performed by: PHYSICIAN ASSISTANT

## 2019-12-08 PROCEDURE — 87077 CULTURE AEROBIC IDENTIFY: CPT | Performed by: PHYSICIAN ASSISTANT

## 2019-12-08 PROCEDURE — 99285 EMERGENCY DEPT VISIT HI MDM: CPT

## 2019-12-08 PROCEDURE — 87186 SC STD MICRODIL/AGAR DIL: CPT | Performed by: PHYSICIAN ASSISTANT

## 2019-12-08 PROCEDURE — 81001 URINALYSIS AUTO W/SCOPE: CPT | Performed by: PHYSICIAN ASSISTANT

## 2019-12-08 PROCEDURE — 70450 CT HEAD/BRAIN W/O DYE: CPT

## 2019-12-08 PROCEDURE — 36415 COLL VENOUS BLD VENIPUNCTURE: CPT | Performed by: PHYSICIAN ASSISTANT

## 2019-12-08 PROCEDURE — 93005 ELECTROCARDIOGRAM TRACING: CPT

## 2019-12-08 PROCEDURE — 71046 X-RAY EXAM CHEST 2 VIEWS: CPT

## 2019-12-08 PROCEDURE — 96361 HYDRATE IV INFUSION ADD-ON: CPT

## 2019-12-08 PROCEDURE — 96360 HYDRATION IV INFUSION INIT: CPT

## 2019-12-08 RX ORDER — CEPHALEXIN 500 MG/1
500 CAPSULE ORAL EVERY 12 HOURS SCHEDULED
Qty: 10 CAPSULE | Refills: 0 | Status: SHIPPED | OUTPATIENT
Start: 2019-12-08 | End: 2019-12-13

## 2019-12-08 RX ORDER — METOPROLOL SUCCINATE 100 MG/1
100 TABLET, EXTENDED RELEASE ORAL DAILY
Qty: 30 TABLET | Refills: 0 | Status: SHIPPED | OUTPATIENT
Start: 2019-12-08 | End: 2022-06-11

## 2019-12-08 RX ADMIN — SODIUM CHLORIDE 1000 ML: 0.9 INJECTION, SOLUTION INTRAVENOUS at 12:55

## 2019-12-08 NOTE — ED PROVIDER NOTES
History  Chief Complaint   Patient presents with    Hypertension     To ED with EMS for evaluation of hypertension since this morning  Patient noted to have fallen yesterday and hit head  No known injury, LOC etc      79-year-old female with history of atrial fibrillation and hypertension presents emergency department from [facility] for evaluation of elevated blood pressure  Patient apparently sustained a minor ground level fall yesterday with witnessed head strike and no loss of consciousness, has been acting normal without complaints since this occurred  Apparently her blood pressures have been in excess of 671 systolic for the past 24 hours  According to her daughter, this has been a problem in the past, particularly while hospitalized for pneumonia  She is currently on metoprolol as well as losartan and has been compliant with her medications  Patient at this time denies any complaints  She does have a persistent dry cough after being diagnosed with pneumonia, according to her daughter this is no worse than it previously was  Prior to Admission Medications   Prescriptions Last Dose Informant Patient Reported? Taking?    PARoxetine (PAXIL) 30 mg tablet   No No   Sig: TAKE 1 TABLET EVERY MORNING   albuterol (PROAIR HFA) 90 mcg/act inhaler   No No   Sig: Inhale 2 puffs every 4 (four) hours as needed for wheezing or shortness of breath   brinzolamide (AZOPT) 1 % ophthalmic suspension  Self Yes No   Sig: Apply to eye daily at bedtime    losartan (COZAAR) 100 MG tablet   No No   Sig: TAKE 1 TABLET DAILY   metoprolol succinate (TOPROL-XL) 100 mg 24 hr tablet   No No   Sig: Take 1 tablet (100 mg total) by mouth daily   metoprolol succinate (TOPROL-XL) 50 mg 24 hr tablet   No No   Sig: TAKE 1 TABLET DAILY   nystatin (MYCOSTATIN) cream  Self Yes No   Sig: Apply topically 2 (two) times a day   omeprazole (PriLOSEC) 20 mg delayed release capsule   No No   Sig: TAKE 1 CAPSULE DAILY Facility-Administered Medications: None       Past Medical History:   Diagnosis Date    A-fib ([facility] [address] )     Osteoarthritis        History reviewed  No pertinent surgical history  Family History   Problem Relation Age of Onset    No Known Problems Mother     No Known Problems Father      I have reviewed and agree with the history as documented  Social History     Tobacco Use    Smoking status: Former Smoker    Smokeless tobacco: Never Used   Substance Use Topics    Alcohol use: Never     Frequency: Never     Drinks per session: Patient refused     Binge frequency: Never    Drug use: No        Review of Systems   Constitutional: Negative for chills, diaphoresis and fever  Eyes: Negative for visual disturbance  Respiratory: Positive for cough (dry, persistent)  Negative for shortness of breath  Cardiovascular: Negative for chest pain and palpitations  Gastrointestinal: Negative for abdominal pain, diarrhea, nausea and vomiting  Genitourinary: Negative for dysuria, flank pain and frequency  Musculoskeletal: Negative for arthralgias and myalgias  Skin: Negative for color change, rash and wound  Allergic/Immunologic: Negative for immunocompromised state  Neurological: Negative for dizziness, light-headedness and headaches  Hematological: Does not bruise/bleed easily  Psychiatric/Behavioral: Negative for confusion  The patient is not nervous/anxious  Physical Exam  Physical Exam   Constitutional: She is oriented to person, place, and time  She appears well-developed and well-nourished  No distress  HENT:   Head: Normocephalic and atraumatic  Mouth/Throat: Oropharynx is clear and moist    Eyes: Pupils are equal, round, and reactive to light  No scleral icterus  Neck: No JVD present  Cardiovascular: Normal rate  An irregularly irregular rhythm present  Exam reveals no gallop and no friction rub  No murmur heard  Pulmonary/Chest: No respiratory distress  She has no wheezes  She has no rales  Abdominal: Soft  Bowel sounds are normal  She exhibits no distension and no mass  There is no tenderness  There is no rebound and no guarding  Musculoskeletal: She exhibits no edema  Neurological: She is alert and oriented to person, place, and time  Skin: Skin is warm and dry  Capillary refill takes less than 2 seconds  She is not diaphoretic  No pallor  Psychiatric: She has a normal mood and affect  Her behavior is normal    Vitals reviewed  Vital Signs  ED Triage Vitals [12/08/19 1038]   Temperature Pulse Respirations Blood Pressure SpO2   (!) 97 °F (36 1 °C) 77 20 (!) 243/112 93 %      Temp Source Heart Rate Source Patient Position - Orthostatic VS BP Location FiO2 (%)   Tympanic Monitor Lying Left arm --      Pain Score       No Pain           Vitals:    12/08/19 1330 12/08/19 1345 12/08/19 1400 12/08/19 1415   BP: (!) 210/96 (!) 202/96 (!) 206/102 (!) 209/95   Pulse: 83 78 76 83   Patient Position - Orthostatic VS:             Visual Acuity      ED Medications  Medications   sodium chloride 0 9 % bolus 1,000 mL (1,000 mL Intravenous New Bag 12/8/19 1255)       Diagnostic Studies  Results Reviewed     Procedure Component Value Units Date/Time    Urine Microscopic [[id number]]  (Abnormal) Collected:  12/08/19 1250    Lab Status:  Final result Specimen:  Urine, Clean Catch Updated:  12/08/19 1312     RBC, UA Innumerable /hpf      WBC, UA 10-20 /hpf      Epithelial Cells Occasional /hpf      Bacteria, UA Occasional /hpf     Urine culture [[id number]] Collected:  12/08/19 1250    Lab Status:   In process Specimen:  Urine, Clean Catch Updated:  12/08/19 1312    UA w Reflex to Microscopic w Reflex to Culture [[id number]]  (Abnormal) Collected:  12/08/19 1250    Lab Status:  Final result Specimen:  Urine, Clean Catch Updated:  12/08/19 1302     Color, UA Yellow     Clarity, UA Cloudy     Specific [place], UA 1 020     pH, UA 8 0     Leukocytes, UA Moderate     Nitrite, UA Negative Protein, UA 30 (1+) mg/dl      Glucose, UA Negative mg/dl      Ketones, UA Negative mg/dl      Urobilinogen, UA 0 2 E U /dl      Bilirubin, UA Negative     Blood, UA Large    Troponin I [[id number]]  (Normal) Collected:  12/08/19 1136    Lab Status:  Final result Specimen:  Blood from Arm, Right Updated:  12/08/19 1204     Troponin I <0 02 ng/mL     Basic metabolic panel [[id number]]  (Abnormal) Collected:  12/08/19 1136    Lab Status:  Final result Specimen:  Blood from Arm, Right Updated:  12/08/19 1155     Sodium 136 mmol/L      Potassium 3 7 mmol/L      Chloride 99 mmol/L      CO2 32 mmol/L      ANION GAP 5 mmol/L      BUN 12 mg/dL      Creatinine 0 71 mg/dL      Glucose 102 mg/dL      Calcium 9 8 mg/dL      eGFR 71 ml/min/1 73sq m     Narrative:       [place] guidelines for Chronic Kidney Disease (CKD):     Stage 1 with normal or high GFR (GFR > 90 mL/min/1 73 square meters)    Stage 2 Mild CKD (GFR = 60-89 mL/min/1 73 square meters)    Stage 3A Moderate CKD (GFR = 45-59 mL/min/1 73 square meters)    Stage 3B Moderate CKD (GFR = 30-44 mL/min/1 73 square meters)    Stage 4 Severe CKD (GFR = 15-29 mL/min/1 73 square meters)    Stage 5 End Stage CKD (GFR <15 mL/min/1 73 square meters)  Note: GFR calculation is accurate only with a steady state creatinine    CBC and differential [[id number]]  (Abnormal) Collected:  12/08/19 1136    Lab Status:  Final result Specimen:  Blood from Arm, Right Updated:  12/08/19 1144     WBC 18 69 Thousand/uL      RBC 4 13 Million/uL      Hemoglobin 13 0 g/dL      Hematocrit 40 9 %      MCV 99 fL      MCH 31 5 pg      MCHC 31 8 g/dL      RDW 12 0 %      MPV 8 9 fL      Platelets 299 Thousands/uL      Neutrophils Relative 86 %      Immat GRANS % 0 %      Lymphocytes Relative 6 %      Monocytes Relative 7 %      Eosinophils Relative 1 %      Basophils Relative 0 %      Neutrophils Absolute 16 13 Thousands/µL      Immature Grans Absolute 0 05 Thousand/uL Lymphocytes Absolute 1 03 Thousands/µL      Monocytes Absolute 1 34 Thousand/µL      Eosinophils Absolute 0 11 Thousand/µL      Basophils Absolute 0 03 Thousands/µL                  XR chest 2 views   ED Interpretation by Taj Orozco PA-C (12/08 1217)   Interval improvement in LLL opacity, no acute infiltrates      CT head without contrast   Final Result by Leonard Rodriguez MD (12/08 1203)      No acute intracranial abnormality  Microangiopathic changes  Workstation performed: [id number]                    Procedures  ECG 12 Lead Documentation Only  Date/Time: 12/8/2019 10:50 AM  Performed by: Taj Orozco PA-C  Authorized by: Taj Orozco PA-C     Indications / Diagnosis:  HTN  ECG reviewed by me, the ED Provider: yes    Patient location:  ED  Previous ECG:     Previous ECG:  Compared to current    Similarity:  No change  Interpretation:     Interpretation: non-specific    Rate:     ECG rate:  84    ECG rate assessment: normal    Rhythm:     Rhythm: sinus rhythm    Ectopy:     Ectopy: PAC and PVCs    QRS:     QRS axis:  Normal    QRS intervals:  Normal  Conduction:     Conduction: normal    ST segments:     ST segments:  Normal  T waves:     T waves: normal    Comments:                   ED Course                               MDM  Number of Diagnoses or Management Options  Accelerated hypertension:   Acute cystitis with hematuria:   Diagnosis management comments: Blood pressures are persistently elevated throughout emergency department stay  Workup revealed leukocytosis however she is afebrile  Urinalysis is grossly bloody with some white blood cells, will treat empirically for urinary tract infection given new urinary incontinence according to her daughter  Chest x-ray appears improved from previous when she was diagnosed with pneumonia, additionally she has no adventitious lung sounds  Will increase her daily metoprolol due to [place] hypertension emergency department    She has no clinical signs or symptoms of hypertensive crisis       Amount and/or Complexity of Data Reviewed  Clinical lab tests: reviewed and ordered  Tests in the radiology section of CPT®: ordered and reviewed  Tests in the medicine section of CPT®: ordered and reviewed  Decide to obtain previous medical records or to obtain history from someone other than the patient: yes  Obtain history from someone other than the patient: yes  Review and summarize past medical records: yes  Discuss the patient with other providers: yes  Independent visualization of images, tracings, or specimens: yes          Disposition  Final diagnoses:   Accelerated hypertension   Acute cystitis with hematuria     Time reflects when diagnosis was documented in both MDM as applicable and the Disposition within this note     Time User Action Codes Description Comment    12/8/2019  1:27 PM Rabia Norris Add [I10] Accelerated hypertension     12/8/2019  1:27 PM Rabia Norris Add [N30 01] Acute cystitis with hematuria     12/8/2019  1:27 PM Rabia Norris Add [I10] Essential hypertension     12/8/2019  1:28 PM Rabia Norris Modify [I10] Essential hypertension       ED Disposition     ED Disposition Condition Date/Time Comment    Discharge Stable Sun Dec 8, 2019  1:27 PM Rehana Daley discharge to home/self care  Follow-up Information     Follow up With Specialties Details Why Contact Info    Ting William MD Internal Medicine In 2 days As needed ROHINI Rivera [address] [place] [address]  [phone]            Patient's Medications   Discharge Prescriptions    CEPHALEXIN (KEFLEX) 500 MG CAPSULE    Take 1 capsule (500 mg total) by mouth every 12 (twelve) hours for 5 days       Start Date: 12/8/2019 End Date: 12/13/2019       Order Dose: 500 mg       Quantity: 10 capsule    Refills: 0     No discharge procedures on file      ED Provider  Electronically Signed by           Marin Angelucci, PA-C  12/08/19 3988

## 2019-12-08 NOTE — ED NOTES
Rounded on patient  She is currently sleeping  Daughter at the bedside  Spoke about transportation time  Daughter stated she would let me know if she was going to leave before she was transported       Rachel Griffin RN  12/08/19 9069

## 2019-12-09 ENCOUNTER — PATIENT OUTREACH (OUTPATIENT)
Dept: CASE MANAGEMENT | Facility: OTHER | Age: 84
End: 2019-12-09

## 2019-12-09 LAB
ATRIAL RATE: 84 BPM
P AXIS: 67 DEGREES
PR INTERVAL: 194 MS
QRS AXIS: -10 DEGREES
QRSD INTERVAL: 74 MS
QT INTERVAL: 370 MS
QTC INTERVAL: 437 MS
T WAVE AXIS: 79 DEGREES
VENTRICULAR RATE: 84 BPM

## 2019-12-09 PROCEDURE — 93010 ELECTROCARDIOGRAM REPORT: CPT | Performed by: INTERNAL MEDICINE

## 2019-12-09 NOTE — PROGRESS NOTES
In basket alert received that patient is in the emergency room  Will continue to monitor via chart review for update on discharge date and disposition

## 2019-12-09 NOTE — PROGRESS NOTES
Chart review completed  Patient discharged from emergency room  Email sent to Bree Horton, [place] [facility] to confirm [place] is back in facility and continues to have a discharge date of 12/19/19 to home with daughter

## 2019-12-10 LAB
BACTERIA UR CULT: ABNORMAL
BACTERIA UR CULT: ABNORMAL

## 2019-12-16 ENCOUNTER — PATIENT OUTREACH (OUTPATIENT)
Dept: CASE MANAGEMENT | Facility: OTHER | Age: 84
End: 2019-12-16

## 2019-12-16 NOTE — PROGRESS NOTES
Email sent to Nakul Ozuna, at [facility], asking to confirm if patient is still scheduled to be discharged on 12/19/19 and if BMP can be drawn prior to discharge, if not already done, as creatinine level is needed

## 2019-12-18 ENCOUNTER — PATIENT OUTREACH (OUTPATIENT)
Dept: CASE MANAGEMENT | Facility: OTHER | Age: 84
End: 2019-12-18

## 2019-12-18 NOTE — PROGRESS NOTES
Reached out to [employer], as emails have not been returned  Per Oral Chin, he sent out an updated email on patient today and will re-send  Patient has declined and is now not expected to be discharged until after 12/27/19 pending progress

## 2019-12-18 NOTE — PROGRESS NOTES
Handoff from centralized OP CM   This CM will continue to monitor throughout the remainder portion of bundle episode

## 2019-12-19 ENCOUNTER — PATIENT OUTREACH (OUTPATIENT)
Dept: CASE MANAGEMENT | Facility: OTHER | Age: 84
End: 2019-12-19

## 2019-12-19 NOTE — PROGRESS NOTES
Email received from Louis Alcala, at [facility], with updated communication form  Bundle Communication Tool attached

## 2019-12-26 ENCOUNTER — PATIENT OUTREACH (OUTPATIENT)
Dept: CASE MANAGEMENT | Facility: OTHER | Age: 84
End: 2019-12-26

## 2019-12-30 ENCOUNTER — PATIENT OUTREACH (OUTPATIENT)
Dept: CASE MANAGEMENT | Facility: OTHER | Age: 84
End: 2019-12-30

## 2019-12-31 NOTE — PROGRESS NOTES
Email clarification from facility [facility] received stating 1/3/19 will be her last therapy date

## 2020-01-02 NOTE — PROGRESS NOTES
Additional email received from facility [facility] 12/31/19 stating patient will become Med B after LCD

## 2020-01-07 ENCOUNTER — PATIENT OUTREACH (OUTPATIENT)
Dept: CASE MANAGEMENT | Facility: OTHER | Age: 85
End: 2020-01-07

## 2020-01-07 NOTE — PROGRESS NOTES
Spoke with [address] who reports patient is now a "Short term rohit under Med B" and applying for medical assistance  [facility] reports patient is not receiving any skilled services at the moment  Anita Travis reports copays would apply but once/if assistance goes through, those costs would be covered  [facility] agreed to outreach from this CM in another week or two

## 2020-01-17 ENCOUNTER — PATIENT OUTREACH (OUTPATIENT)
Dept: CASE MANAGEMENT | Facility: OTHER | Age: 85
End: 2020-01-17

## 2020-01-17 NOTE — PROGRESS NOTES
Email replies from  reports patient remains at facility with a plan to discharge to home with daughter  Patient is applying for MA  This care manager will continue to monitor via chart review throughout bundle episode

## 2020-01-23 ENCOUNTER — PATIENT OUTREACH (OUTPATIENT)
Dept: CASE MANAGEMENT | Facility: OTHER | Age: 85
End: 2020-01-23

## 2020-01-31 ENCOUNTER — PATIENT OUTREACH (OUTPATIENT)
Dept: CASE MANAGEMENT | Facility: OTHER | Age: 85
End: 2020-01-31

## 2020-02-07 ENCOUNTER — PATIENT OUTREACH (OUTPATIENT)
Dept: CASE MANAGEMENT | Facility: OTHER | Age: 85
End: 2020-02-07

## 2020-02-12 ENCOUNTER — PATIENT OUTREACH (OUTPATIENT)
Dept: CASE MANAGEMENT | Facility: OTHER | Age: 85
End: 2020-02-12

## 2020-02-12 NOTE — PROGRESS NOTES
Email sent to facility requesting update on patient  Email reply from  reports patient will be staying under MA when OT services are completed

## 2020-02-18 ENCOUNTER — PATIENT OUTREACH (OUTPATIENT)
Dept: CASE MANAGEMENT | Facility: OTHER | Age: 85
End: 2020-02-18

## 2020-02-18 NOTE — PROGRESS NOTES
Bundle program ended 2/17/20  BPCI form updated, care coordination note updated and bundle episode resolved  CM removed self from care team and sent Inbasket message to SERENA Cabrera regarding bundle closure  Inbasket to Doc Vargas regarding TREMAINE / CKD

## 2020-03-03 NOTE — PROGRESS NOTES
3/3/20 - Jason w/ FIORELLA Vale at [facility]  Confirmed patient will remain at the facility for long-term care  Therefore, no longer eligible for TREMAINE/CKD

## 2022-06-02 ENCOUNTER — TELEPHONE (OUTPATIENT)
Dept: FAMILY MEDICINE CLINIC | Facility: HOSPITAL | Age: 87
End: 2022-06-02

## 2022-06-02 NOTE — TELEPHONE ENCOUNTER
Pt's daughter requesting call from Dr Debbie Mon concerning her mother  Would not give further details   PCB

## 2022-06-03 ENCOUNTER — HOSPITAL ENCOUNTER (INPATIENT)
Facility: HOSPITAL | Age: 87
LOS: 8 days | Discharge: HOSPICE | DRG: 871 | End: 2022-06-11
Attending: EMERGENCY MEDICINE | Admitting: INTERNAL MEDICINE
Payer: MEDICARE

## 2022-06-03 ENCOUNTER — APPOINTMENT (EMERGENCY)
Dept: CT IMAGING | Facility: HOSPITAL | Age: 87
DRG: 871 | End: 2022-06-03
Payer: MEDICARE

## 2022-06-03 DIAGNOSIS — A41.9 SEVERE SEPSIS (HCC): ICD-10-CM

## 2022-06-03 DIAGNOSIS — K64.9 HEMORRHOIDS: ICD-10-CM

## 2022-06-03 DIAGNOSIS — R41.89 UNRESPONSIVE EPISODE: ICD-10-CM

## 2022-06-03 DIAGNOSIS — K92.2 GI BLEED: Primary | ICD-10-CM

## 2022-06-03 DIAGNOSIS — K80.20 GALLSTONES: ICD-10-CM

## 2022-06-03 DIAGNOSIS — N39.0 UTI (URINARY TRACT INFECTION): ICD-10-CM

## 2022-06-03 DIAGNOSIS — I63.9 CEREBROVASCULAR ACCIDENT (CVA), UNSPECIFIED MECHANISM (HCC): ICD-10-CM

## 2022-06-03 DIAGNOSIS — R65.20 SEVERE SEPSIS (HCC): ICD-10-CM

## 2022-06-03 PROBLEM — N30.90 CYSTITIS: Status: ACTIVE | Noted: 2022-06-03

## 2022-06-03 PROBLEM — K57.92 ACUTE DIVERTICULITIS: Status: ACTIVE | Noted: 2022-06-03

## 2022-06-03 PROBLEM — K62.5 RECTAL BLEEDING: Status: ACTIVE | Noted: 2022-06-03

## 2022-06-03 LAB
2HR DELTA HS TROPONIN: 0 NG/L
ABO GROUP BLD: NORMAL
ALBUMIN SERPL BCP-MCNC: 2.5 G/DL (ref 3.5–5)
ALP SERPL-CCNC: 73 U/L (ref 46–116)
ALT SERPL W P-5'-P-CCNC: 8 U/L (ref 12–78)
ANION GAP SERPL CALCULATED.3IONS-SCNC: 5 MMOL/L (ref 4–13)
APTT PPP: 36 SECONDS (ref 23–37)
AST SERPL W P-5'-P-CCNC: 12 U/L (ref 5–45)
BACTERIA UR QL AUTO: ABNORMAL /HPF
BASOPHILS # BLD AUTO: 0.03 THOUSANDS/ΜL (ref 0–0.1)
BASOPHILS NFR BLD AUTO: 0 % (ref 0–1)
BILIRUB SERPL-MCNC: 0.2 MG/DL (ref 0.2–1)
BILIRUB UR QL STRIP: NEGATIVE
BLD GP AB SCN SERPL QL: NEGATIVE
BUN SERPL-MCNC: 20 MG/DL (ref 5–25)
CALCIUM ALBUM COR SERPL-MCNC: 10.5 MG/DL (ref 8.3–10.1)
CALCIUM SERPL-MCNC: 9.3 MG/DL (ref 8.3–10.1)
CARDIAC TROPONIN I PNL SERPL HS: 6 NG/L
CARDIAC TROPONIN I PNL SERPL HS: 6 NG/L
CHLORIDE SERPL-SCNC: 108 MMOL/L (ref 100–108)
CLARITY UR: ABNORMAL
CO2 SERPL-SCNC: 27 MMOL/L (ref 21–32)
COLOR UR: YELLOW
CREAT SERPL-MCNC: 0.87 MG/DL (ref 0.6–1.3)
EOSINOPHIL # BLD AUTO: 0.16 THOUSAND/ΜL (ref 0–0.61)
EOSINOPHIL NFR BLD AUTO: 2 % (ref 0–6)
ERYTHROCYTE [DISTWIDTH] IN BLOOD BY AUTOMATED COUNT: 12.3 % (ref 11.6–15.1)
FLUAV RNA RESP QL NAA+PROBE: NEGATIVE
FLUBV RNA RESP QL NAA+PROBE: NEGATIVE
GFR SERPL CREATININE-BSD FRML MDRD: 54 ML/MIN/1.73SQ M
GLUCOSE SERPL-MCNC: 146 MG/DL (ref 65–140)
GLUCOSE UR STRIP-MCNC: NEGATIVE MG/DL
HCT VFR BLD AUTO: 26.8 % (ref 34.8–46.1)
HCT VFR BLD AUTO: 34.3 % (ref 34.8–46.1)
HGB BLD-MCNC: 10.7 G/DL (ref 11.5–15.4)
HGB BLD-MCNC: 8.5 G/DL (ref 11.5–15.4)
HGB UR QL STRIP.AUTO: ABNORMAL
IMM GRANULOCYTES # BLD AUTO: 0.02 THOUSAND/UL (ref 0–0.2)
IMM GRANULOCYTES NFR BLD AUTO: 0 % (ref 0–2)
INR PPP: 1.06 (ref 0.84–1.19)
KETONES UR STRIP-MCNC: ABNORMAL MG/DL
LACTATE SERPL-SCNC: 2.6 MMOL/L (ref 0.5–2)
LACTATE SERPL-SCNC: 3 MMOL/L (ref 0.5–2)
LEUKOCYTE ESTERASE UR QL STRIP: ABNORMAL
LIPASE SERPL-CCNC: 62 U/L (ref 73–393)
LYMPHOCYTES # BLD AUTO: 3.11 THOUSANDS/ΜL (ref 0.6–4.47)
LYMPHOCYTES NFR BLD AUTO: 34 % (ref 14–44)
MAGNESIUM SERPL-MCNC: 2.2 MG/DL (ref 1.6–2.6)
MCH RBC QN AUTO: 31.1 PG (ref 26.8–34.3)
MCHC RBC AUTO-ENTMCNC: 31.2 G/DL (ref 31.4–37.4)
MCV RBC AUTO: 100 FL (ref 82–98)
MONOCYTES # BLD AUTO: 0.5 THOUSAND/ΜL (ref 0.17–1.22)
MONOCYTES NFR BLD AUTO: 5 % (ref 4–12)
NEUTROPHILS # BLD AUTO: 5.43 THOUSANDS/ΜL (ref 1.85–7.62)
NEUTS SEG NFR BLD AUTO: 59 % (ref 43–75)
NITRITE UR QL STRIP: POSITIVE
NON-SQ EPI CELLS URNS QL MICRO: ABNORMAL /HPF
NRBC BLD AUTO-RTO: 0 /100 WBCS
NT-PROBNP SERPL-MCNC: 204 PG/ML
PH UR STRIP.AUTO: 5.5 [PH]
PLATELET # BLD AUTO: 330 THOUSANDS/UL (ref 149–390)
PMV BLD AUTO: 10.2 FL (ref 8.9–12.7)
POTASSIUM SERPL-SCNC: 4.2 MMOL/L (ref 3.5–5.3)
PROCALCITONIN SERPL-MCNC: <0.05 NG/ML
PROT SERPL-MCNC: 5.7 G/DL (ref 6.4–8.2)
PROT UR STRIP-MCNC: ABNORMAL MG/DL
PROTHROMBIN TIME: 13.7 SECONDS (ref 11.6–14.5)
RBC # BLD AUTO: 3.44 MILLION/UL (ref 3.81–5.12)
RBC #/AREA URNS AUTO: ABNORMAL /HPF
RH BLD: POSITIVE
RSV RNA RESP QL NAA+PROBE: NEGATIVE
SARS-COV-2 RNA RESP QL NAA+PROBE: NEGATIVE
SODIUM SERPL-SCNC: 140 MMOL/L (ref 136–145)
SP GR UR STRIP.AUTO: >=1.03 (ref 1–1.03)
SPECIMEN EXPIRATION DATE: NORMAL
TSH SERPL DL<=0.05 MIU/L-ACNC: 3.64 UIU/ML (ref 0.45–4.5)
UROBILINOGEN UR QL STRIP.AUTO: 0.2 E.U./DL
WBC # BLD AUTO: 9.25 THOUSAND/UL (ref 4.31–10.16)
WBC #/AREA URNS AUTO: ABNORMAL /HPF

## 2022-06-03 PROCEDURE — 36415 COLL VENOUS BLD VENIPUNCTURE: CPT | Performed by: EMERGENCY MEDICINE

## 2022-06-03 PROCEDURE — 96375 TX/PRO/DX INJ NEW DRUG ADDON: CPT

## 2022-06-03 PROCEDURE — C9113 INJ PANTOPRAZOLE SODIUM, VIA: HCPCS | Performed by: INTERNAL MEDICINE

## 2022-06-03 PROCEDURE — 85610 PROTHROMBIN TIME: CPT | Performed by: EMERGENCY MEDICINE

## 2022-06-03 PROCEDURE — 96365 THER/PROPH/DIAG IV INF INIT: CPT

## 2022-06-03 PROCEDURE — 87040 BLOOD CULTURE FOR BACTERIA: CPT | Performed by: EMERGENCY MEDICINE

## 2022-06-03 PROCEDURE — 86901 BLOOD TYPING SEROLOGIC RH(D): CPT | Performed by: EMERGENCY MEDICINE

## 2022-06-03 PROCEDURE — 86850 RBC ANTIBODY SCREEN: CPT | Performed by: EMERGENCY MEDICINE

## 2022-06-03 PROCEDURE — 85730 THROMBOPLASTIN TIME PARTIAL: CPT | Performed by: EMERGENCY MEDICINE

## 2022-06-03 PROCEDURE — 81001 URINALYSIS AUTO W/SCOPE: CPT | Performed by: EMERGENCY MEDICINE

## 2022-06-03 PROCEDURE — 99222 1ST HOSP IP/OBS MODERATE 55: CPT | Performed by: INTERNAL MEDICINE

## 2022-06-03 PROCEDURE — 99291 CRITICAL CARE FIRST HOUR: CPT | Performed by: EMERGENCY MEDICINE

## 2022-06-03 PROCEDURE — 80053 COMPREHEN METABOLIC PANEL: CPT | Performed by: EMERGENCY MEDICINE

## 2022-06-03 PROCEDURE — 85025 COMPLETE CBC W/AUTO DIFF WBC: CPT | Performed by: EMERGENCY MEDICINE

## 2022-06-03 PROCEDURE — 74176 CT ABD & PELVIS W/O CONTRAST: CPT

## 2022-06-03 PROCEDURE — 1124F ACP DISCUSS-NO DSCNMKR DOCD: CPT | Performed by: EMERGENCY MEDICINE

## 2022-06-03 PROCEDURE — 83880 ASSAY OF NATRIURETIC PEPTIDE: CPT | Performed by: EMERGENCY MEDICINE

## 2022-06-03 PROCEDURE — 86900 BLOOD TYPING SEROLOGIC ABO: CPT | Performed by: EMERGENCY MEDICINE

## 2022-06-03 PROCEDURE — 83735 ASSAY OF MAGNESIUM: CPT | Performed by: EMERGENCY MEDICINE

## 2022-06-03 PROCEDURE — 86920 COMPATIBILITY TEST SPIN: CPT

## 2022-06-03 PROCEDURE — 84443 ASSAY THYROID STIM HORMONE: CPT | Performed by: EMERGENCY MEDICINE

## 2022-06-03 PROCEDURE — 93005 ELECTROCARDIOGRAM TRACING: CPT

## 2022-06-03 PROCEDURE — 0241U HB NFCT DS VIR RESP RNA 4 TRGT: CPT | Performed by: EMERGENCY MEDICINE

## 2022-06-03 PROCEDURE — 84484 ASSAY OF TROPONIN QUANT: CPT | Performed by: EMERGENCY MEDICINE

## 2022-06-03 PROCEDURE — 87077 CULTURE AEROBIC IDENTIFY: CPT | Performed by: EMERGENCY MEDICINE

## 2022-06-03 PROCEDURE — 83690 ASSAY OF LIPASE: CPT | Performed by: EMERGENCY MEDICINE

## 2022-06-03 PROCEDURE — 87154 CUL TYP ID BLD PTHGN 6+ TRGT: CPT | Performed by: EMERGENCY MEDICINE

## 2022-06-03 PROCEDURE — 94760 N-INVAS EAR/PLS OXIMETRY 1: CPT

## 2022-06-03 PROCEDURE — 85018 HEMOGLOBIN: CPT | Performed by: PHYSICIAN ASSISTANT

## 2022-06-03 PROCEDURE — 84145 PROCALCITONIN (PCT): CPT | Performed by: EMERGENCY MEDICINE

## 2022-06-03 PROCEDURE — 83605 ASSAY OF LACTIC ACID: CPT | Performed by: EMERGENCY MEDICINE

## 2022-06-03 PROCEDURE — 70450 CT HEAD/BRAIN W/O DYE: CPT

## 2022-06-03 PROCEDURE — G1004 CDSM NDSC: HCPCS

## 2022-06-03 PROCEDURE — C9113 INJ PANTOPRAZOLE SODIUM, VIA: HCPCS | Performed by: EMERGENCY MEDICINE

## 2022-06-03 PROCEDURE — 87186 SC STD MICRODIL/AGAR DIL: CPT | Performed by: EMERGENCY MEDICINE

## 2022-06-03 PROCEDURE — 96366 THER/PROPH/DIAG IV INF ADDON: CPT

## 2022-06-03 PROCEDURE — 99285 EMERGENCY DEPT VISIT HI MDM: CPT

## 2022-06-03 PROCEDURE — 71250 CT THORAX DX C-: CPT

## 2022-06-03 PROCEDURE — 85014 HEMATOCRIT: CPT | Performed by: PHYSICIAN ASSISTANT

## 2022-06-03 PROCEDURE — 94640 AIRWAY INHALATION TREATMENT: CPT

## 2022-06-03 PROCEDURE — 87086 URINE CULTURE/COLONY COUNT: CPT | Performed by: EMERGENCY MEDICINE

## 2022-06-03 RX ORDER — SODIUM CHLORIDE 9 MG/ML
50 INJECTION, SOLUTION INTRAVENOUS CONTINUOUS
Status: DISCONTINUED | OUTPATIENT
Start: 2022-06-03 | End: 2022-06-05

## 2022-06-03 RX ORDER — BRINZOLAMIDE 10 MG/ML
1 SUSPENSION/ DROPS OPHTHALMIC
Status: DISCONTINUED | OUTPATIENT
Start: 2022-06-03 | End: 2022-06-11 | Stop reason: HOSPADM

## 2022-06-03 RX ORDER — METOPROLOL SUCCINATE 50 MG/1
100 TABLET, EXTENDED RELEASE ORAL DAILY
Status: DISCONTINUED | OUTPATIENT
Start: 2022-06-04 | End: 2022-06-04

## 2022-06-03 RX ORDER — CEFTRIAXONE 1 G/50ML
1000 INJECTION, SOLUTION INTRAVENOUS ONCE
Status: COMPLETED | OUTPATIENT
Start: 2022-06-03 | End: 2022-06-03

## 2022-06-03 RX ORDER — ALBUTEROL SULFATE 90 UG/1
2 AEROSOL, METERED RESPIRATORY (INHALATION) EVERY 4 HOURS PRN
Status: DISCONTINUED | OUTPATIENT
Start: 2022-06-03 | End: 2022-06-11 | Stop reason: HOSPADM

## 2022-06-03 RX ORDER — LEVALBUTEROL 1.25 MG/.5ML
1.25 SOLUTION, CONCENTRATE RESPIRATORY (INHALATION) ONCE
Status: COMPLETED | OUTPATIENT
Start: 2022-06-03 | End: 2022-06-03

## 2022-06-03 RX ORDER — NYSTATIN 100000 U/G
CREAM TOPICAL 2 TIMES DAILY
Status: DISCONTINUED | OUTPATIENT
Start: 2022-06-03 | End: 2022-06-11 | Stop reason: HOSPADM

## 2022-06-03 RX ORDER — CEFTRIAXONE 1 G/50ML
1000 INJECTION, SOLUTION INTRAVENOUS DAILY
Status: COMPLETED | OUTPATIENT
Start: 2022-06-04 | End: 2022-06-10

## 2022-06-03 RX ORDER — MAGNESIUM HYDROXIDE/ALUMINUM HYDROXICE/SIMETHICONE 120; 1200; 1200 MG/30ML; MG/30ML; MG/30ML
30 SUSPENSION ORAL EVERY 6 HOURS PRN
Status: DISCONTINUED | OUTPATIENT
Start: 2022-06-03 | End: 2022-06-11 | Stop reason: HOSPADM

## 2022-06-03 RX ORDER — SODIUM CHLORIDE 9 MG/ML
3 INJECTION INTRAVENOUS EVERY 8 HOURS SCHEDULED
Status: DISCONTINUED | OUTPATIENT
Start: 2022-06-03 | End: 2022-06-09

## 2022-06-03 RX ORDER — PANTOPRAZOLE SODIUM 40 MG/1
40 INJECTION, POWDER, FOR SOLUTION INTRAVENOUS EVERY 12 HOURS
Status: DISCONTINUED | OUTPATIENT
Start: 2022-06-03 | End: 2022-06-05

## 2022-06-03 RX ORDER — DOCUSATE SODIUM 100 MG/1
100 CAPSULE, LIQUID FILLED ORAL 2 TIMES DAILY
Status: DISCONTINUED | OUTPATIENT
Start: 2022-06-03 | End: 2022-06-07

## 2022-06-03 RX ORDER — PAROXETINE HYDROCHLORIDE 20 MG/1
20 TABLET, FILM COATED ORAL EVERY MORNING
Status: DISCONTINUED | OUTPATIENT
Start: 2022-06-04 | End: 2022-06-07

## 2022-06-03 RX ORDER — ONDANSETRON 2 MG/ML
4 INJECTION INTRAMUSCULAR; INTRAVENOUS EVERY 6 HOURS PRN
Status: DISCONTINUED | OUTPATIENT
Start: 2022-06-03 | End: 2022-06-11 | Stop reason: HOSPADM

## 2022-06-03 RX ADMIN — PANTOPRAZOLE SODIUM 40 MG: 40 INJECTION, POWDER, FOR SOLUTION INTRAVENOUS at 22:47

## 2022-06-03 RX ADMIN — IPRATROPIUM BROMIDE 0.5 MG: 0.5 SOLUTION RESPIRATORY (INHALATION) at 22:38

## 2022-06-03 RX ADMIN — SODIUM CHLORIDE 3 ML: 9 INJECTION, SOLUTION INTRAMUSCULAR; INTRAVENOUS; SUBCUTANEOUS at 13:16

## 2022-06-03 RX ADMIN — PANTOPRAZOLE SODIUM 80 MG: 40 INJECTION, POWDER, FOR SOLUTION INTRAVENOUS at 13:41

## 2022-06-03 RX ADMIN — SODIUM CHLORIDE 3 ML: 9 INJECTION, SOLUTION INTRAMUSCULAR; INTRAVENOUS; SUBCUTANEOUS at 22:00

## 2022-06-03 RX ADMIN — SODIUM CHLORIDE 1000 ML: 0.9 INJECTION, SOLUTION INTRAVENOUS at 16:08

## 2022-06-03 RX ADMIN — SODIUM CHLORIDE 125 ML/HR: 0.9 INJECTION, SOLUTION INTRAVENOUS at 18:00

## 2022-06-03 RX ADMIN — BRINZOLAMIDE 1 DROP: 10 SUSPENSION/ DROPS OPHTHALMIC at 22:52

## 2022-06-03 RX ADMIN — SODIUM CHLORIDE 1000 ML: 0.9 INJECTION, SOLUTION INTRAVENOUS at 13:11

## 2022-06-03 RX ADMIN — CEFTRIAXONE 1000 MG: 1 INJECTION, SOLUTION INTRAVENOUS at 13:59

## 2022-06-03 RX ADMIN — PANTOPRAZOLE SODIUM 8 MG/HR: 40 INJECTION, POWDER, FOR SOLUTION INTRAVENOUS at 13:57

## 2022-06-03 RX ADMIN — LEVALBUTEROL HYDROCHLORIDE 1.25 MG: 1.25 SOLUTION, CONCENTRATE RESPIRATORY (INHALATION) at 22:38

## 2022-06-03 NOTE — ASSESSMENT & PLAN NOTE
As per daughter had one episode last night and again this am   Gi consult  Hope to avoid endoscopy if possible

## 2022-06-03 NOTE — ASSESSMENT & PLAN NOTE
More lethargic since yesterday and then had an episode of unresponsiveness this am   Now less responsive according to daughter Darrell Breed      neuro checks

## 2022-06-03 NOTE — SEPSIS NOTE
[facility]   [facility]: [id number]  Unit/Bed#: ED 02 Encounter: [id number]       qSOFA     [address] Name 06/03/22 1600 06/03/22 1530 06/03/22 1515 06/03/22 1500 06/03/22 1445    Altered mental status GCS < 15 -- -- -- -- --    Respiratory Rate > / =22 0 0 0 0 0    Systolic BP < / =258 0 -- 0 0 0    Q Sofa Score 1 1 1 1 1    Row Name 06/03/22 1430 06/03/22 1415 06/03/22 1400 06/03/22 1345 06/03/22 1319    Altered mental status GCS < 15 -- -- -- -- 1    Respiratory Rate > / =22 0 0 0 0 --    Systolic BP < / =858 0 -- 0 0 --    Q Sofa Score 1 1 1 1 1    Row Name 06/03/22 1307 06/03/22 1300 06/03/22 1257          Altered mental status GCS < 15 -- 1 1      Respiratory Rate > / =22 0 -- 0      Systolic BP < / =457 0 -- 0      Q Sofa Score 1 1 1                 Initial Sepsis Screening     Row Name 06/03/22 1442 06/03/22 1357             Is the patient's history suggestive of a new or worsening infection? -- Yes (Proceed)  -VIMAL       Suspected source of infection -- urinary tract infection  -VIMAL       Are two or more of the following signs & symptoms of infection both present and new to the patient? -- --       Indicate SIRS criteria -- Hypothermia < 36C (96 8F); Tachycardia > 90 bpm  -VIMAL       If the answer is yes to both questions, suspicion of sepsis is present -- --       If severe sepsis is present AND tissue hypoperfusion perists in the hour after fluid resuscitation or lactate > 4, the patient meets criteria for SEPTIC SHOCK -- --       Are any of the following organ dysfunction criteria present within 6 hours of suspected infection and SIRS criteria that are NOT considered to be chronic conditions?  Yes  -VIMAL --       Organ dysfunction Lactate > 2 0 mmol/L  -VIMAL --       Date of presentation of severe sepsis 06/03/22  -Ethan Anaya --       Time of presentation of severe sepsis 1442  -VIMAL --       Tissue hypoperfusion persists in the hour after crystalloid fluid administration, evidenced, by either: -- --       Was hypotension present within one hour of the conclusion of crystalloid fluid administration?  No  -VIMAL --       Date of presentation of septic shock -- --       Time of presentation of septic shock -- --             User Key  (r) = Recorded By, (t) = Taken By, (c) = Cosigned By    [address] Name Provider Type    [address], DO Physician                  Default Flowsheet Data (last 720 hours)     Sepsis Reassess     Row Name 06/03/22 1607                   Repeat Volume Status and Tissue Perfusion Assessment Performed    Repeat Volume Status and Tissue Perfusion Assessment Performed Yes  -VIMAL                  Volume Status and Tissue Perfusion Post Fluid Resuscitation * Must Document All *    Vital Signs Reviewed (HR, RR, BP, T) --  HR 99, RR 20, T 97 5  -VIMAL        Shock Index Reviewed --        Arterial Oxygen Saturation Reviewed (POx, SaO2 or SpO2) Yes (comment %)  95% room air  -VIMAL        Cardio Normal S1/S2  -VIMAL        Pulmonary --        Capillary Refill --        Peripheral Pulses --        Skin Cool;Diaphoretic  -VIMAL        Urine output assessed Decreased  -VIMAL                  *OR*   Intensive Monitoring- Must Document One of the Following Four *:    Vital Signs Reviewed --        * Central Venous Pressure (CVP or RAP) --        * Central Venous Oxygen (SVO2, ScvO2 or Oxygen saturation via central catheter) --        * Bedside Cardiovascular US in IVC diameter and % collapse --        * Passive Leg Raise OR Crystalloid Challenge --              User Key  (r) = Recorded By, (t) = Taken By, (c) = Cosigned By    Initials Name Provider Type    [address], DO Physician

## 2022-06-03 NOTE — DISCHARGE INSTRUCTIONS
Follow-up with PCP in 1 week  Continue care as per hospice team  Repeat speech and swallow evaluation at nursing facility  Return to ER with any worsening shortness of breath, chest pain, fever, chills or any other alarming symptoms

## 2022-06-03 NOTE — ASSESSMENT & PLAN NOTE
Patient with fever and altered mental status in setting of acute diverticulitis and also acute cystitis with hematuria   elevated lactic aci level - continue to trend   being given 2nd iv bolus by Er staff

## 2022-06-03 NOTE — PLAN OF CARE
Problem: Potential for Falls  Goal: Patient will remain free of falls  Description: INTERVENTIONS:  - Educate patient/family on patient safety including physical limitations  - Instruct patient to call for assistance with activity   - Consult OT/PT to assist with strengthening/mobility   - Keep Call bell within reach  - Keep bed low and locked with side rails adjusted as appropriate  - Keep care items and personal belongings within reach  - Initiate and maintain comfort rounds  - Make Fall Risk Sign visible to staff  - Offer Toileting every 2 Hours, in advance of need  - Initiate/Maintain bed alarm  - Obtain necessary fall risk management equipment:   - Apply yellow socks and bracelet for high fall risk patients  - Consider moving patient to room near nurses station  Outcome: Progressing     Problem: GASTROINTESTINAL - ADULT  Goal: Minimal or absence of nausea and/or vomiting  Description: INTERVENTIONS:  - Administer IV fluids if ordered to ensure adequate hydration  - Maintain NPO status until nausea and vomiting are resolved  - Nasogastric tube if ordered  - Administer ordered antiemetic medications as needed  - Provide nonpharmacologic comfort measures as appropriate  - Advance diet as tolerated, if ordered  - Consider nutrition services referral to assist patient with adequate nutrition and appropriate food choices  Outcome: Progressing  Goal: Maintains or returns to baseline bowel function  Description: INTERVENTIONS:  - Assess bowel function  - Encourage oral fluids to ensure adequate hydration  - Administer IV fluids if ordered to ensure adequate hydration  - Administer ordered medications as needed  - Encourage mobilization and activity  - Consider nutritional services referral to assist patient with adequate nutrition and appropriate food choices  Outcome: Progressing  Goal: Maintains adequate nutritional intake  Description: INTERVENTIONS:  - Monitor percentage of each meal consumed  - Identify factors contributing to decreased intake, treat as appropriate  - Assist with meals as needed  - Monitor I&O, weight, and lab values if indicated  - Obtain nutrition services referral as needed  Outcome: Progressing  Goal: Establish and maintain optimal ostomy function  Description: INTERVENTIONS:  - Assess bowel function  - Encourage oral fluids to ensure adequate hydration  - Administer IV fluids if ordered to ensure adequate hydration   - Administer ordered medications as needed  - Encourage mobilization and activity  - Nutrition services referral to assist patient with appropriate food choices  - Assess stoma site  - Consider wound care consult   Outcome: Progressing  Goal: Oral mucous membranes remain intact  Description: INTERVENTIONS  - Assess oral mucosa and hygiene practices  - Implement preventative oral hygiene regimen  - Implement oral medicated treatments as ordered  - Initiate Nutrition services referral as needed  Outcome: Progressing     Problem: GENITOURINARY - ADULT  Goal: Maintains or returns to baseline urinary function  Description: INTERVENTIONS:  - Assess urinary function  - Encourage oral fluids to ensure adequate hydration if ordered  - Administer IV fluids as ordered to ensure adequate hydration  - Administer ordered medications as needed  - Offer frequent toileting  - Follow urinary retention protocol if ordered  Outcome: Progressing  Goal: Absence of urinary retention  Description: INTERVENTIONS:  - Assess patients ability to void and empty bladder  - Monitor I/O  - Bladder scan as needed  - Discuss with physician/AP medications to alleviate retention as needed  - Discuss catheterization for long term situations as appropriate  Outcome: Progressing  Goal: Urinary catheter remains patent  Description: INTERVENTIONS:  - Assess patency of urinary catheter  - If patient has a chronic edwards, consider changing catheter if non-functioning  - Follow guidelines for intermittent irrigation of non-functioning urinary catheter  Outcome: Progressing     Problem: METABOLIC, FLUID AND ELECTROLYTES - ADULT  Goal: Electrolytes maintained within normal limits  Description: INTERVENTIONS:  - Monitor labs and assess patient for signs and symptoms of electrolyte imbalances  - Administer electrolyte replacement as ordered  - Monitor response to electrolyte replacements, including repeat lab results as appropriate  - Instruct patient on fluid and nutrition as appropriate  Outcome: Progressing  Goal: Fluid balance maintained  Description: INTERVENTIONS:  - Monitor labs   - Monitor I/O and WT  - Instruct patient on fluid and nutrition as appropriate  - Assess for signs & symptoms of volume excess or deficit  Outcome: Progressing  Goal: Glucose maintained within target range  Description: INTERVENTIONS:  - Monitor Blood Glucose as ordered  - Assess for signs and symptoms of hyperglycemia and hypoglycemia  - Administer ordered medications to maintain glucose within target range  - Assess nutritional intake and initiate nutrition service referral as needed  Outcome: Progressing     Problem: SKIN/TISSUE INTEGRITY - ADULT  Goal: Skin Integrity remains intact(Skin Breakdown Prevention)  Description: Assess:  -Perform Miguel assessment every   -Clean and moisturize skin every   -Inspect skin when repositioning, toileting, and assisting with ADLS  -Assess under medical devices such as  every   -Assess extremities for adequate circulation and sensation     Bed Management:  -Have minimal linens on bed & keep smooth, unwrinkled  -Change linens as needed when moist or perspiring  -Avoid sitting or lying in one position for more than  hours while in bed  -Keep HOB at degrees     Toileting:  -Offer bedside commode  -Assess for incontinence every   -Use incontinent care products after each incontinent episode such as     Activity:  -Mobilize patient  times a day  -Encourage activity and walks on unit  -Encourage or provide ROM exercises   -Turn and reposition patient every  Hours  -Use appropriate equipment to lift or move patient in bed  -Instruct/ Assist with weight shifting every  when out of bed in chair  -Consider limitation of chair time  hour intervals    Skin Care:  -Avoid use of baby powder, tape, friction and shearing, hot water or constrictive clothing  -Relieve pressure over bony prominences using   -Do not massage red bony areas    Next Steps:  -Teach patient strategies to minimize risks such as    -Consider consults to  interdisciplinary teams such as   Outcome: Progressing  Goal: Incision(s), wounds(s) or drain site(s) healing without S/S of infection  Description: INTERVENTIONS  - Assess and document dressing, incision, wound bed, drain sites and surrounding tissue  - Provide patient and family education  - Perform skin care/dressing changes every   Outcome: Progressing  Goal: Pressure injury heals and does not worsen  Description: Interventions:  - Implement low air loss mattress or specialty surface (Criteria met)  - Apply silicone foam dressing  - Instruct/assist with weight shifting every  minutes when in chair   - Limit chair time to  hour intervals  - Use special pressure reducing interventions such as  when in chair   - Apply fecal or urinary incontinence containment device   - Perform passive or active ROM every   - Turn and reposition patient & offload bony prominences every  hours   - Utilize friction reducing device or surface for transfers   - Consider consults to  interdisciplinary teams such as   - Use incontinent care products after each incontinent episode such as  - Consider nutrition services referral as needed  Outcome: Progressing     Problem: MUSCULOSKELETAL - ADULT  Goal: Maintain or return mobility to safest level of function  Description: INTERVENTIONS:  - Assess patient's ability to carry out ADLs; assess patient's baseline for ADL function and identify physical deficits which impact ability to perform ADLs (bathing, care of mouth/teeth, toileting, grooming, dressing, etc )  - Assess/evaluate cause of self-care deficits   - Assess range of motion  - Assess patient's mobility  - Assess patient's need for assistive devices and provide as appropriate  - Encourage maximum independence but intervene and supervise when necessary  - Involve family in performance of ADLs  - Assess for home care needs following discharge   - Consider OT consult to assist with ADL evaluation and planning for discharge  - Provide patient education as appropriate  Outcome: Progressing  Goal: Maintain proper alignment of affected body part  Description: INTERVENTIONS:  - Support, maintain and protect limb and body alignment  - Provide patient/ family with appropriate education  Outcome: Progressing

## 2022-06-03 NOTE — ED PROVIDER NOTES
History  Chief Complaint   Patient presents with    Altered Mental Status     Patient with pmh dementia, UTI, a fibb, DNR, brought in by ambulance from [facility] with concern for unresponsive episode just pta  Yesterday patient had a large dark red bowel movement with concern for a GI bleed  Family opted not to go to the hospital at that time  Today at physical therapy she had a sudden unresponsive episode  There was no injuries  Per paramedics the blood sugar was normal, she was not responding to painful stimuli  She is normally ambulatory and disoriented but conversant  Sometimes she can get violent  Prior to Admission Medications   Prescriptions Last Dose Informant Patient Reported? Taking? PARoxetine (PAXIL) 30 mg tablet 6/2/2022 at Unknown time  No Yes   Sig: TAKE 1 TABLET EVERY MORNING   albuterol (PROAIR HFA) 90 mcg/act inhaler 6/2/2022 at Unknown time  No Yes   Sig: Inhale 2 puffs every 4 (four) hours as needed for wheezing or shortness of breath   brinzolamide (AZOPT) 1 % ophthalmic suspension 6/2/2022 at Unknown time Self Yes Yes   Sig: Apply to eye daily at bedtime    losartan (COZAAR) 100 MG tablet 6/2/2022 at Unknown time  No Yes   Sig: TAKE 1 TABLET DAILY   metoprolol succinate (TOPROL-XL) 100 mg 24 hr tablet 6/2/2022 at Unknown time  No Yes   Sig: Take 1 tablet (100 mg total) by mouth daily   nystatin (MYCOSTATIN) cream 6/2/2022 at Unknown time Self Yes Yes   Sig: Apply topically 2 (two) times a day   omeprazole (PriLOSEC) 20 mg delayed release capsule 6/2/2022 at Unknown time  No Yes   Sig: TAKE 1 CAPSULE DAILY      Facility-Administered Medications: None       Past Medical History:   Diagnosis Date    A-fib ([facility] [address] )     Gastrointestinal hemorrhage associated with intestinal diverticulitis     Osteoarthritis     UTI (urinary tract infection)        History reviewed  No pertinent surgical history      Family History   Problem Relation Age of Onset    No Known Problems Mother     No Known Problems Father      I have reviewed and agree with the history as documented  E-Cigarette/Vaping    E-Cigarette Use Never User      E-Cigarette/Vaping Substances    Nicotine No     THC No     CBD No     Flavoring No     Other No     Unknown No      Social History     Tobacco Use    Smoking status: Former Smoker    Smokeless tobacco: Never Used   Vaping Use    Vaping Use: Never used   Substance Use Topics    Alcohol use: Never    Drug use: No       Review of Systems   Unable to perform ROS: Mental status change   All other systems reviewed and are negative  Physical Exam  Physical Exam  Vitals and nursing note reviewed  Exam conducted with a chaperone present  Constitutional:       General: She is in acute distress  Appearance: She is well-developed  She is diaphoretic  HENT:      Head: Normocephalic and atraumatic  Right Ear: External ear normal       Left Ear: External ear normal       Nose: Nose normal       Mouth/Throat:      Mouth: Mucous membranes are moist    Eyes:      Conjunctiva/sclera: Conjunctivae normal       Comments: Closes eyes upon my evaluation   Cardiovascular:      Rate and Rhythm: Normal rate and regular rhythm  Heart sounds: Normal heart sounds  Pulmonary:      Effort: Pulmonary effort is normal  No respiratory distress  Breath sounds: Normal breath sounds  No wheezing  Abdominal:      General: Bowel sounds are normal  There is distension  Palpations: Abdomen is soft  Tenderness: There is no abdominal tenderness  Comments: Distention of abdomen more toward right side   Genitourinary:     Exam position: Supine  Rectum: Guaiac result positive  Tenderness and external hemorrhoid present  Comments: Scarring around urethra noted    Large amount of non-thrombosed hemorrhoids but dark red blood noted could be from deeper source  Musculoskeletal:         General: No deformity        Cervical back: Normal range of motion and neck supple  No spinous process tenderness  Skin:     General: Skin is warm  Findings: No rash  Neurological:      General: No focal deficit present  GCS: GCS eye subscore is 4  GCS verbal subscore is 3  GCS motor subscore is 4  Cranial Nerves: No facial asymmetry  Sensory: Sensation is intact  No sensory deficit  Motor: Motor function is intact  Psychiatric:         Mood and Affect: Mood is anxious  Affect is angry           Vital Signs  ED Triage Vitals   Temperature Pulse Respirations Blood Pressure SpO2   06/03/22 1307 06/03/22 1257 06/03/22 1257 06/03/22 1257 06/03/22 1257   (S) (!) 95 9 °F (35 5 °C) 89 20 126/62 94 %      Temp Source Heart Rate Source Patient Position - Orthostatic VS BP Location FiO2 (%)   06/03/22 1307 06/03/22 1257 06/03/22 1257 06/03/22 1257 --   Tymp Core Monitor Sitting Right arm       Pain Score       06/03/22 1257       No Pain           Vitals:    06/03/22 1701 06/03/22 1834 06/03/22 2118 06/03/22 2238   BP: 151/76 126/70 119/69    Pulse: (!) 107 104 (!) 122 (!) 106   Patient Position - Orthostatic VS:             Visual Acuity  Visual Acuity    Flowsheet Row Most Recent Value   L Pupil Size (mm) 2   R Pupil Size (mm) 2   L Pupil Shape Round   R Pupil Shape Round          ED Medications  Medications   sodium chloride (PF) 0 9 % injection 3 mL (3 mL Intravenous Given 6/3/22 1316)   albuterol (PROVENTIL HFA,VENTOLIN HFA) inhaler 2 puff (has no administration in time range)   brinzolamide (AZOPT) 1 % ophthalmic suspension 1 drop (1 drop Both Eyes Given 6/3/22 2252)   metoprolol succinate (TOPROL-XL) 24 hr tablet 100 mg (has no administration in time range)   nystatin (MYCOSTATIN) cream ( Topical Not Given 6/3/22 1756)   PARoxetine (PAXIL) tablet 20 mg (has no administration in time range)   sodium chloride 0 9 % infusion (125 mL/hr Intravenous New Bag 6/3/22 1800)   aluminum-magnesium hydroxide-simethicone (MYLANTA) oral suspension 30 mL (has no administration in time range)   ondansetron (ZOFRAN) injection 4 mg (has no administration in time range)   docusate sodium (COLACE) capsule 100 mg (100 mg Oral Not Given 6/3/22 1756)   pantoprazole (PROTONIX) injection 40 mg (40 mg Intravenous Given 6/3/22 2247)   cefTRIAXone (ROCEPHIN) IVPB (premix in dextrose) 1,000 mg 50 mL (has no administration in time range)   sodium chloride 0 9 % bolus 1,000 mL (0 mL Intravenous Stopped 6/3/22 1341)   pantoprazole (PROTONIX) 80 mg in sodium chloride 0 9 % 100 mL IVPB (0 mg Intravenous Stopped 6/3/22 1356)   cefTRIAXone (ROCEPHIN) IVPB (premix in dextrose) 1,000 mg 50 mL (0 mg Intravenous Stopped 6/3/22 1429)   sodium chloride 0 9 % bolus 1,000 mL (0 mL Intravenous Stopped 6/3/22 1700)   levalbuterol (XOPENEX) inhalation solution 1 25 mg (1 25 mg Nebulization Given 6/3/22 2238)   ipratropium (ATROVENT) 0 02 % inhalation solution 0 5 mg (0 5 mg Nebulization Given 6/3/22 2238)       Diagnostic Studies  Results Reviewed     Procedure Component Value Units Date/Time    HS Troponin I 2hr [[id number]]  (Normal) Collected: 06/03/22 1510    Lab Status: Final result Specimen: Blood from Arm, Right Updated: 06/03/22 1832     hs TnI 2hr 6 ng/L      Delta 2hr hsTnI 0 ng/L     Lactic acid 2 Hours [[id number]]  (Abnormal) Collected: 06/03/22 1510    Lab Status: Final result Specimen: Blood from Arm, Right Updated: 06/03/22 1558     LACTIC ACID 3 0 mmol/L     Narrative:      Result may be elevated if tourniquet was used during collection  COVID/FLU/RSV - 2 hour TAT [[id number]]  (Normal) Collected: 06/03/22 1311    Lab Status: Final result Specimen: Nares from Nose Updated: 06/03/22 1529     SARS-CoV-2 Negative     INFLUENZA A PCR Negative     INFLUENZA B PCR Negative     RSV PCR Negative    Narrative:      FOR PEDIATRIC PATIENTS - copy/paste COVID Guidelines URL to browser: [email or web address]    SARS-CoV-2 assay is a Nucleic Acid Amplification assay intended for the  qualitative detection of nucleic acid from SARS-CoV-2 in nasopharyngeal  swabs  Results are for the presumptive identification of SARS-CoV-2 RNA  Positive results are indicative of infection with SARS-CoV-2, the virus  causing COVID-19, but do not rule out bacterial infection or co-infection  with other viruses  Laboratories within the [place] and its  territories are required to report all positive results to the appropriate  public health authorities  Negative results do not preclude SARS-CoV-2  infection and should not be used as the sole basis for treatment or other  patient management decisions  Negative results must be combined with  clinical observations, patient history, and epidemiological information  This test has not been FDA cleared or approved  This test has been authorized by FDA under an Emergency Use Authorization  (EUA)  This test is only authorized for the duration of time the  declaration that circumstances exist justifying the authorization of the  emergency use of an in vitro diagnostic tests for detection of SARS-CoV-2  virus and/or diagnosis of COVID-19 infection under section 564(b)(1) of  the Act, 21 U  S C  320GLD-6(A)(6), unless the authorization is terminated  or revoked sooner  The test has been validated but independent review by FDA  and CLIA is pending  Test performed using [employer] GeneXpert: This RT-PCR assay targets N2,  a region unique to SARS-CoV-2  A conserved region in the E-gene was chosen  for pan-Sarbecovirus detection which includes SARS-CoV-2  Urine Microscopic [[id number]]  (Abnormal) Collected: 06/03/22 1316    Lab Status: Final result Specimen: Urine, Indwelling Dupont Catheter Updated: 06/03/22 1459     RBC, UA 30-50 /hpf      WBC, UA 10-20 /hpf      Epithelial Cells Moderate /hpf      Bacteria, UA Moderate /hpf     Urine culture [[id number]] Collected: 06/03/22 1316    Lab Status:  In process Specimen: Urine, Indwelling Dupont Catheter Updated: 06/03/22 1459    Procalcitonin [[id number]]  (Normal) Collected: 06/03/22 1311    Lab Status: Final result Specimen: Blood from Arm, Right Updated: 06/03/22 1447     Procalcitonin <0 05 ng/ml     Comprehensive metabolic panel [[id number]]  (Abnormal) Collected: 06/03/22 1311    Lab Status: Final result Specimen: Blood from Arm, Right Updated: 06/03/22 1420     Sodium 140 mmol/L      Potassium 4 2 mmol/L      Chloride 108 mmol/L      CO2 27 mmol/L      ANION GAP 5 mmol/L      BUN 20 mg/dL      Creatinine 0 87 mg/dL      Glucose 146 mg/dL      Calcium 9 3 mg/dL      Corrected Calcium 10 5 mg/dL      AST 12 U/L      ALT 8 U/L      Alkaline Phosphatase 73 U/L      Total Protein 5 7 g/dL      Albumin 2 5 g/dL      Total Bilirubin 0 20 mg/dL      eGFR 54 ml/min/1 73sq m     Narrative:      [place] guidelines for Chronic Kidney Disease (CKD):     Stage 1 with normal or high GFR (GFR > 90 mL/min/1 73 square meters)    Stage 2 Mild CKD (GFR = 60-89 mL/min/1 73 square meters)    Stage 3A Moderate CKD (GFR = 45-59 mL/min/1 73 square meters)    Stage 3B Moderate CKD (GFR = 30-44 mL/min/1 73 square meters)    Stage 4 Severe CKD (GFR = 15-29 mL/min/1 73 square meters)    Stage 5 End Stage CKD (GFR <15 mL/min/1 73 square meters)  Note: GFR calculation is accurate only with a steady state creatinine    HS Troponin 0hr (reflex protocol) [[id number]]  (Normal) Collected: 06/03/22 1311    Lab Status: Final result Specimen: Blood from Arm, Right Updated: 06/03/22 1408     hs TnI 0hr 6 ng/L     NT-BNP PRO [[id number]]  (Normal) Collected: 06/03/22 1311    Lab Status: Final result Specimen: Blood from Arm, Right Updated: 06/03/22 1358     NT-proBNP 204 pg/mL     Magnesium [[id number]]  (Normal) Collected: 06/03/22 1311    Lab Status: Final result Specimen: Blood from Arm, Right Updated: 06/03/22 1358     Magnesium 2 2 mg/dL     Lipase [[id number]]  (Abnormal) Collected: 06/03/22 1311    Lab Status: Final result Specimen: Blood from Arm, Right Updated: 06/03/22 1358     Lipase 62 u/L     TSH [[id number]]  (Normal) Collected: 06/03/22 1311    Lab Status: Final result Specimen: Blood from Arm, Right Updated: 06/03/22 1358     TSH 3RD GENERATON 3 643 uIU/mL     Narrative:      Patients undergoing fluorescein dye angiography may retain small amounts of fluorescein in the body for 48-72 hours post procedure  Samples containing fluorescein can produce falsely depressed TSH values  If the patient had this procedure,a specimen should be resubmitted post fluorescein clearance  Lactic acid [[id number]]  (Abnormal) Collected: 06/03/22 1311    Lab Status: Final result Specimen: Blood from Arm, Right Updated: 06/03/22 1358     LACTIC ACID 2 6 mmol/L     Narrative:      Result may be elevated if tourniquet was used during collection      Protime-INR [[id number]]  (Normal) Collected: 06/03/22 1311    Lab Status: Final result Specimen: Blood from Arm, Right Updated: 06/03/22 1346     Protime 13 7 seconds      INR 1 06    APTT [[id number]]  (Normal) Collected: 06/03/22 1311    Lab Status: Final result Specimen: Blood from Arm, Right Updated: 06/03/22 1346     PTT 36 seconds     UA w Reflex to Microscopic w Reflex to Culture [[id number]]  (Abnormal) Collected: 06/03/22 1316    Lab Status: Final result Specimen: Urine, Indwelling Dupont Catheter Updated: 06/03/22 1337     Color, UA Yellow     Clarity, UA Cloudy     Specific Gravity, UA >=1 030     pH, UA 5 5     Leukocytes, UA Small     Nitrite, UA Positive     Protein, UA Trace mg/dl      Glucose, UA Negative mg/dl      Ketones, UA Trace mg/dl      Urobilinogen, UA 0 2 E U /dl      Bilirubin, UA Negative     Blood, UA Large    CBC and differential [[id number]]  (Abnormal) Collected: 06/03/22 1311    Lab Status: Final result Specimen: Blood from Arm, Right Updated: 06/03/22 1329     WBC 9 25 Thousand/uL      RBC 3 44 Million/uL      Hemoglobin 10 7 g/dL Hematocrit 34 3 %       fL      MCH 31 1 pg      MCHC 31 2 g/dL      RDW 12 3 %      MPV 10 2 fL      Platelets 502 Thousands/uL      nRBC 0 /100 WBCs      Neutrophils Relative 59 %      Immat GRANS % 0 %      Lymphocytes Relative 34 %      Monocytes Relative 5 %      Eosinophils Relative 2 %      Basophils Relative 0 %      Neutrophils Absolute 5 43 Thousands/µL      Immature Grans Absolute 0 02 Thousand/uL      Lymphocytes Absolute 3 11 Thousands/µL      Monocytes Absolute 0 50 Thousand/µL      Eosinophils Absolute 0 16 Thousand/µL      Basophils Absolute 0 03 Thousands/µL     Blood culture #2 [[id number]] Collected: 06/03/22 1311    Lab Status: In process Specimen: Blood from Arm, Right Updated: 06/03/22 1322    Blood culture #1 [[id number]] Collected: 06/03/22 1311    Lab Status: In process Specimen: Blood from Arm, Left Updated: 06/03/22 1322                 CT head without contrast   Final Result by Lula Jaime MD (06/03 1438)      No acute intracranial abnormality  Unchanged microangiopathic changes  Workstation performed: [id number]         CT chest abdomen pelvis wo contrast   Final Result by Lula Jaime MD (06/03 1146)         1  Technically limited study due to motion  2   No acute abnormality identified in the chest, abdomen, or pelvis  3   Gallstones without evidence for cholecystitis or biliary obstruction  4   Additional findings as noted                    Workstation performed: [id number]                    Procedures  ECG 12 Lead Documentation Only    Date/Time: 6/3/2022 1:10 PM  Performed by: Ora Martinez DO  Authorized by: Ora Martinez DO     Indications / Diagnosis:  Unresponsive episode  ECG reviewed by me, the ED Provider: yes    Patient location:  ED  Previous ECG:     Previous ECG:  Compared to current    Comparison ECG info:  2019    Similarity:  No change  Interpretation:     Interpretation: normal    Rate:     ECG rate:  86    ECG rate assessment: normal    Rhythm:     Rhythm: sinus rhythm    Ectopy:     Ectopy: none    QRS:     QRS axis:  Normal    QRS intervals:  Normal  Conduction:     Conduction: normal    ST segments:     ST segments:  Normal  T waves:     T waves: normal    CriticalCare Time  Performed by: Asher Covarrubias DO  Authorized by: Asher Covarrubias DO     Critical care provider statement:     Critical care time (minutes):  40    Critical care time was exclusive of:  Separately billable procedures and treating other patients and teaching time    Critical care was necessary to treat or prevent imminent or life-threatening deterioration of the following conditions:  CNS failure or compromise    Critical care was time spent personally by me on the following activities:  Obtaining history from patient or surrogate, development of treatment plan with patient or surrogate, discussions with consultants, evaluation of patient's response to treatment, examination of patient, review of old charts, re-evaluation of patient's condition, ordering and review of radiographic studies, ordering and review of laboratory studies and ordering and performing treatments and interventions  POC AAA US    Date/Time: 6/3/2022 1:16 PM  Performed by: Asher Covarrubias DO  Authorized by: Asher Covarrubias DO     Patient location:  ED  Performing Provider:  Attending  Procedure details:     Exam Type:  Diagnostic    Indications: syncope      Views Obtained:  Transverse proximal, transverse mid view, transverse distal view and sagittal (longitudinal) view    Image quality: diagnostic      Image availability:  Not obtained due to urgency  Findings:     Abdominal Aorta Findings: normal      Aneurysm (if present): no abdominal aortic aneurysm      Intra-abdominal fluid: not identified    Interpretation:     Aortic ultrasound impression: aorta normal               ED Course                            Initial Sepsis Screening     Row Name 06/03/22 1442 06/03/22 1352 Is the patient's history suggestive of a new or worsening infection? -- Yes (Proceed)  -VIMAL       Suspected source of infection -- urinary tract infection  -VIMAL       Are two or more of the following signs & symptoms of infection both present and new to the patient? -- --       Indicate SIRS criteria -- Hypothermia < 36C (96 8F); Tachycardia > 90 bpm  -VIMAL       If the answer is yes to both questions, suspicion of sepsis is present -- --       If severe sepsis is present AND tissue hypoperfusion perists in the hour after fluid resuscitation or lactate > 4, the patient meets criteria for SEPTIC SHOCK -- --       Are any of the following organ dysfunction criteria present within 6 hours of suspected infection and SIRS criteria that are NOT considered to be chronic conditions? Yes  -VIMAL --       Organ dysfunction Lactate > 2 0 mmol/L  -VIMAL --       Date of presentation of severe sepsis 06/03/22  -[facility] --       Time of presentation of severe sepsis 1442  -VIMAL --       Tissue hypoperfusion persists in the hour after crystalloid fluid administration, evidenced, by either: -- --       Was hypotension present within one hour of the conclusion of crystalloid fluid administration?  No  -VIMAL --       Date of presentation of septic shock -- --       Time of presentation of septic shock -- --             User Key  (r) = Recorded By, (t) = Taken By, (c) = Cosigned By    [address] Name Provider Type    Jennifer Ferrari DO Physician              Default Flowsheet Data (last 720 hours)     Sepsis Reassess     Row Name 06/03/22 1606                   Repeat Volume Status and Tissue Perfusion Assessment Performed    Repeat Volume Status and Tissue Perfusion Assessment Performed Yes  -VIMAL                  Volume Status and Tissue Perfusion Post Fluid Resuscitation * Must Document All *    Vital Signs Reviewed (HR, RR, BP, T) --  HR 99, RR 20, T 97 5  -VIMAL        Shock Index Reviewed --        Arterial Oxygen Saturation Reviewed (POx, SaO2 or SpO2) Yes (comment %)  95% room air  -VIMAL        Cardio Normal S1/S2  -VIMAL        Pulmonary --        Capillary Refill --        Peripheral Pulses --        Skin Cool;Diaphoretic  -VIMAL        Urine output assessed Decreased  -VIMAL                  *OR*   Intensive Monitoring- Must Document One of the Following Four *:    Vital Signs Reviewed --        * Central Venous Pressure (CVP or RAP) --        * Central Venous Oxygen (SVO2, ScvO2 or Oxygen saturation via central catheter) --        * Bedside Cardiovascular US in IVC diameter and % collapse --        * Passive Leg Raise OR Crystalloid Challenge --              User Key  (r) = Recorded By, (t) = Taken By, (c) = Cosigned By    Initials Name Provider Type    [address], DO Physician              SBIRT 20yo+    Flowsheet Row Most Recent Value   SBIRT (25 yo +)    In order to provide better care to our patients, we are screening all of our patients for alcohol and drug use  Would it be okay to ask you these screening questions?  Unable to answer at this time Filed at: 06/03/2022 125                    MDM  Number of Diagnoses or Management Options  Gallstones: new and requires workup  GI bleed: new and requires workup  Hemorrhoids: new and requires workup  Severe sepsis [facility]): new and requires workup  Unresponsive episode: new and requires workup  UTI (urinary tract infection): new and requires workup     Amount and/or Complexity of Data Reviewed  Clinical lab tests: ordered and reviewed  Tests in the radiology section of CPT®: ordered and reviewed  Obtain history from someone other than the patient: yes  Discuss the patient with other providers: yes    Patient Progress  Patient progress: improved      Disposition  Final diagnoses:   GI bleed   Unresponsive episode   UTI (urinary tract infection)   Severe sepsis ([facility] [address] )   Gallstones   Hemorrhoids     Time reflects when diagnosis was documented in both MDM as applicable and the Disposition within this note     Time User Action Codes Description Comment    6/3/2022  1:57 PM Hadley Curryson Add [K92 2] GI bleed     6/3/2022  1:58 PM Hadley Curryson Add [R41 89] Unresponsive episode     6/3/2022  1:58 PM Butcherelke ChiChin Add [N39 0] UTI (urinary tract infection)     6/3/2022  3:02 PM Butcher Chin Add [A41 9,  R65 20] Severe sepsis ([address] )     6/3/2022  3:24 PM Butcher Chin Add [K80 20] Gallstones     6/3/2022  3:25 PM Butcher Chin Add [K64 9] Hemorrhoids       ED Disposition     ED Disposition   Admit    Condition   Stable    Date/Time   Fri Zach 3, 2022  3:24 PM    Comment   Case was discussed with Fly* and the patient's admission status was agreed to be Admission Status: inpatient status to the service of Dr Pat Parson**   Follow-up Information    None         Current Discharge Medication List      CONTINUE these medications which have NOT CHANGED    Details   albuterol (PROAIR HFA) 90 mcg/act inhaler Inhale 2 puffs every 4 (four) hours as needed for wheezing or shortness of breath  Qty: 1 Inhaler, Refills: 3    Associated Diagnoses: Mild intermittent asthma without complication      brinzolamide (AZOPT) 1 % ophthalmic suspension Apply to eye daily at bedtime       losartan (COZAAR) 100 MG tablet TAKE 1 TABLET DAILY  Qty: 90 tablet, Refills: 4    Associated Diagnoses: Essential hypertension      metoprolol succinate (TOPROL-XL) 100 mg 24 hr tablet Take 1 tablet (100 mg total) by mouth daily  Qty: 30 tablet, Refills: 0    Associated Diagnoses: Essential hypertension      nystatin (MYCOSTATIN) cream Apply topically 2 (two) times a day      omeprazole (PriLOSEC) 20 mg delayed release capsule TAKE 1 CAPSULE DAILY  Qty: 90 capsule, Refills: 3    Associated Diagnoses: Gastroesophageal reflux disease without esophagitis      PARoxetine (PAXIL) 30 mg tablet TAKE 1 TABLET EVERY MORNING  Qty: 90 tablet, Refills: 4    Associated Diagnoses: Dysthymia             No discharge procedures on file      PDMP Review     None          ED Provider  Electronically Signed by           Ora Martinez,   06/03/22 7297

## 2022-06-03 NOTE — H&P
Waylon Moya  H&P- Atul Méndez 6/10/1921, 80 y o  female MRN: [id number]  Unit/Bed#: ED 02 Encounter: [id number]  Primary Care Provider: Yamileth Aguilar MD   Date and time admitted to hospital: 6/3/2022 12:56 PM    Acute diverticulitis  Assessment & Plan  Noted on ct- will -  continue antibioitcs   Cystitis  Assessment & Plan  Urine and blood cultures are pendin  Day #1  antibioics    Encephalopathy, metabolic  Assessment & Plan  More lethargic since yesterday and then had an episode of unresponsiveness this am   Now less responsive according to daughter Stanley Lindsey   neuro checks                                           * Sepsis ([facility] [address] )  Assessment & Plan  Patient with fever and altered mental status in setting of acute diverticulitis and also acute cystitis with hematuria   elevated lactic aci level - continue to trend   being given 2nd iv bolus by Er staff    Mild intermittent asthma without complication  Assessment & Plan  Patient without complaints of sob- contintinue usual meds    Rectal bleeding  Assessment & Plan  As per daughter had one episode last night and again this am   Gi consult  Hope to avoid endoscopy if possible      VTE Prophylaxis: ordered    Code Status: as above    Anticipated Length of Stay: as above    Justification for Hospital Stay: see assessment and plan        Chief Complaint:   8 year old female who presents from assisted facility with bright red rectal bleeding last night and small amount this am    and rbc on labs History of Present Illness:  She was less respnsive and sent to er for evaluation   Daughter is at bedside and discusion regarding code status and review of  polst form- will continue with dnr level 3    she is complaining  of lower abdominal pain d felt to have acute diverticulitis on ct, also appears to be likely to  uti with some wbc and rbc on labs      Shawnee GAINES Edi is a 80 y o  female who presents with above chief compaint  Review of Systems:    Review of Systems   Constitutional: Negative for fatigue and fever  Less responsive   Gastrointestinal: Positive for abdominal pain and blood in stool  Neurological:        Has some ongoing dementia but is usually conversant and now less responsive in ed   All other systems reviewed and are negative  Past Medical and Surgical History:     Past Medical History:   Diagnosis Date    A-fib [facility])     Gastrointestinal hemorrhage associated with intestinal diverticulitis     Osteoarthritis     UTI (urinary tract infection)        History reviewed  No pertinent surgical history  Meds/Allergies:    Prior to Admission Medications   Prescriptions Last Dose Informant Patient Reported? Taking? PARoxetine (PAXIL) 30 mg tablet   No No   Sig: TAKE 1 TABLET EVERY MORNING   albuterol (PROAIR HFA) 90 mcg/act inhaler   No No   Sig: Inhale 2 puffs every 4 (four) hours as needed for wheezing or shortness of breath   brinzolamide (AZOPT) 1 % ophthalmic suspension  Self Yes No   Sig: Apply to eye daily at bedtime    losartan (COZAAR) 100 MG tablet   No No   Sig: TAKE 1 TABLET DAILY   metoprolol succinate (TOPROL-XL) 100 mg 24 hr tablet   No No   Sig: Take 1 tablet (100 mg total) by mouth daily   nystatin (MYCOSTATIN) cream  Self Yes No   Sig: Apply topically 2 (two) times a day   omeprazole (PriLOSEC) 20 mg delayed release capsule   No No   Sig: TAKE 1 CAPSULE DAILY      Facility-Administered Medications: None       Allergies:    Allergies   Allergen Reactions    Sulfa Antibiotics Rash     per t sys    Sulfamethoxazole-Trimethoprim Rash       Social History:     Social History     Substance and Sexual Activity   Alcohol Use Never     Social History     Tobacco Use   Smoking Status Former Smoker   Smokeless Tobacco Never Used     Social History     Substance and Sexual Activity   Drug Use No       Family History:    Family History   Problem Relation Age of Onset    No Known Problems Mother     No Known Problems Father        Physical Exam:     Vitals:   Blood Pressure: 159/74 (06/03/22 1600)  Pulse: 92 (06/03/22 1615)  Temperature: 98 1 °F (36 7 °C) (06/03/22 1615)  Temp Source: Tympanic Core (06/03/22 1307)  Respirations: 18 (06/03/22 1600)  Height: 5' 4" (162 6 cm) (06/03/22 1257)  Weight - Scale: 61 2 kg (135 lb) (06/03/22 1257)  SpO2: 94 % (06/03/22 1615)    Physical Exam  Vitals and nursing note reviewed  Constitutional:       Comments: Lethargic- huddles under blankets when moved   HENT:      Right Ear: There is no impacted cerumen  Left Ear: There is no impacted cerumen  Mouth/Throat:      Mouth: Mucous membranes are dry  Pharynx: No posterior oropharyngeal erythema  Eyes:      General: No scleral icterus  Right eye: No discharge  Left eye: No discharge  Cardiovascular:      Rate and Rhythm: Normal rate and regular rhythm  Heart sounds: No murmur heard  Pulmonary:      Breath sounds: No wheezing or rhonchi  Abdominal:      Tenderness: There is abdominal tenderness  Comments: llq tenderness   Musculoskeletal:         General: No swelling  Cervical back: No tenderness  Neurological:      Mental Status: She is disoriented               Additional Data:     Lab Results: I personally reviewed them    Results from last 7 days   Lab Units 06/03/22  1311   WBC Thousand/uL 9 25   HEMOGLOBIN g/dL 10 7*   HEMATOCRIT % 34 3*   PLATELETS Thousands/uL 330   NEUTROS PCT % 59   LYMPHS PCT % 34   MONOS PCT % 5   EOS PCT % 2     Results from last 7 days   Lab Units 06/03/22  1311   POTASSIUM mmol/L 4 2   CHLORIDE mmol/L 108   CO2 mmol/L 27   BUN mg/dL 20   CREATININE mg/dL 0 87   CALCIUM mg/dL 9 3   ALK PHOS U/L 73   ALT U/L 8*   AST U/L 12     Results from last 7 days   Lab Units 06/03/22  1311   INR  1 06       Imaging: I personally reviewed them    CT head without contrast   Final Result by Ajay Acosta MD (06/03 4744)      No acute intracranial abnormality  Unchanged microangiopathic changes  Workstation performed: [id number]         CT chest abdomen pelvis wo contrast   Final Result by Avila Schultz MD (06/03 1356)         1  Technically limited study due to motion  2   No acute abnormality identified in the chest, abdomen, or pelvis  3   Gallstones without evidence for cholecystitis or biliary obstruction  4   Additional findings as noted  Workstation performed: [id number]             EKG : I personally reviewed      Diony Woods DO    ** Please Note: This note has been constructed using a voice recognition system   **

## 2022-06-04 PROBLEM — D62 ACUTE BLOOD LOSS ANEMIA: Status: ACTIVE | Noted: 2022-06-04

## 2022-06-04 LAB
ALBUMIN SERPL BCP-MCNC: 2.1 G/DL (ref 3.5–5)
ALP SERPL-CCNC: 53 U/L (ref 46–116)
ALT SERPL W P-5'-P-CCNC: 10 U/L (ref 12–78)
ANION GAP SERPL CALCULATED.3IONS-SCNC: 9 MMOL/L (ref 4–13)
AST SERPL W P-5'-P-CCNC: 16 U/L (ref 5–45)
BILIRUB SERPL-MCNC: 0.2 MG/DL (ref 0.2–1)
BUN SERPL-MCNC: 20 MG/DL (ref 5–25)
CALCIUM ALBUM COR SERPL-MCNC: 9.6 MG/DL (ref 8.3–10.1)
CALCIUM SERPL-MCNC: 8.1 MG/DL (ref 8.3–10.1)
CHLORIDE SERPL-SCNC: 110 MMOL/L (ref 100–108)
CO2 SERPL-SCNC: 23 MMOL/L (ref 21–32)
CREAT SERPL-MCNC: 0.77 MG/DL (ref 0.6–1.3)
ERYTHROCYTE [DISTWIDTH] IN BLOOD BY AUTOMATED COUNT: 12.6 % (ref 11.6–15.1)
FERRITIN SERPL-MCNC: 64 NG/ML (ref 8–388)
GFR SERPL CREATININE-BSD FRML MDRD: 63 ML/MIN/1.73SQ M
GLUCOSE SERPL-MCNC: 110 MG/DL (ref 65–140)
HCT VFR BLD AUTO: 21.3 % (ref 34.8–46.1)
HCT VFR BLD AUTO: 23.8 % (ref 34.8–46.1)
HCT VFR BLD AUTO: 31 % (ref 34.8–46.1)
HGB BLD-MCNC: 10.1 G/DL (ref 11.5–15.4)
HGB BLD-MCNC: 6.9 G/DL (ref 11.5–15.4)
HGB BLD-MCNC: 7.3 G/DL (ref 11.5–15.4)
IRON SATN MFR SERPL: 32 % (ref 15–50)
IRON SERPL-MCNC: 63 UG/DL (ref 50–170)
LACTATE SERPL-SCNC: 2 MMOL/L (ref 0.5–2)
MCH RBC QN AUTO: 30.8 PG (ref 26.8–34.3)
MCHC RBC AUTO-ENTMCNC: 30.7 G/DL (ref 31.4–37.4)
MCV RBC AUTO: 100 FL (ref 82–98)
PLATELET # BLD AUTO: 261 THOUSANDS/UL (ref 149–390)
PMV BLD AUTO: 10.2 FL (ref 8.9–12.7)
POTASSIUM SERPL-SCNC: 4.1 MMOL/L (ref 3.5–5.3)
PROT SERPL-MCNC: 5 G/DL (ref 6.4–8.2)
RBC # BLD AUTO: 2.37 MILLION/UL (ref 3.81–5.12)
SODIUM SERPL-SCNC: 142 MMOL/L (ref 136–145)
TIBC SERPL-MCNC: 198 UG/DL (ref 250–450)
WBC # BLD AUTO: 9.17 THOUSAND/UL (ref 4.31–10.16)

## 2022-06-04 PROCEDURE — 30233M1 TRANSFUSION OF NONAUTOLOGOUS PLASMA CRYOPRECIPITATE INTO PERIPHERAL VEIN, PERCUTANEOUS APPROACH: ICD-10-PCS | Performed by: INTERNAL MEDICINE

## 2022-06-04 PROCEDURE — 83605 ASSAY OF LACTIC ACID: CPT | Performed by: PHYSICIAN ASSISTANT

## 2022-06-04 PROCEDURE — 99232 SBSQ HOSP IP/OBS MODERATE 35: CPT | Performed by: INTERNAL MEDICINE

## 2022-06-04 PROCEDURE — 30233N1 TRANSFUSION OF NONAUTOLOGOUS RED BLOOD CELLS INTO PERIPHERAL VEIN, PERCUTANEOUS APPROACH: ICD-10-PCS | Performed by: INTERNAL MEDICINE

## 2022-06-04 PROCEDURE — 82728 ASSAY OF FERRITIN: CPT | Performed by: PHYSICIAN ASSISTANT

## 2022-06-04 PROCEDURE — 83540 ASSAY OF IRON: CPT | Performed by: PHYSICIAN ASSISTANT

## 2022-06-04 PROCEDURE — 83550 IRON BINDING TEST: CPT | Performed by: PHYSICIAN ASSISTANT

## 2022-06-04 PROCEDURE — 85018 HEMOGLOBIN: CPT | Performed by: PHYSICIAN ASSISTANT

## 2022-06-04 PROCEDURE — 85027 COMPLETE CBC AUTOMATED: CPT | Performed by: PHYSICIAN ASSISTANT

## 2022-06-04 PROCEDURE — C9113 INJ PANTOPRAZOLE SODIUM, VIA: HCPCS | Performed by: INTERNAL MEDICINE

## 2022-06-04 PROCEDURE — 80053 COMPREHEN METABOLIC PANEL: CPT | Performed by: PHYSICIAN ASSISTANT

## 2022-06-04 PROCEDURE — P9016 RBC LEUKOCYTES REDUCED: HCPCS

## 2022-06-04 PROCEDURE — 85014 HEMATOCRIT: CPT | Performed by: PHYSICIAN ASSISTANT

## 2022-06-04 RX ORDER — METOPROLOL TARTRATE 5 MG/5ML
5 INJECTION INTRAVENOUS EVERY 6 HOURS PRN
Status: DISCONTINUED | OUTPATIENT
Start: 2022-06-04 | End: 2022-06-05

## 2022-06-04 RX ORDER — SODIUM CHLORIDE, SODIUM GLUCONATE, SODIUM ACETATE, POTASSIUM CHLORIDE, MAGNESIUM CHLORIDE, SODIUM PHOSPHATE, DIBASIC, AND POTASSIUM PHOSPHATE .53; .5; .37; .037; .03; .012; .00082 G/100ML; G/100ML; G/100ML; G/100ML; G/100ML; G/100ML; G/100ML
500 INJECTION, SOLUTION INTRAVENOUS ONCE
Status: COMPLETED | OUTPATIENT
Start: 2022-06-04 | End: 2022-06-04

## 2022-06-04 RX ADMIN — PANTOPRAZOLE SODIUM 40 MG: 40 INJECTION, POWDER, FOR SOLUTION INTRAVENOUS at 11:06

## 2022-06-04 RX ADMIN — SODIUM CHLORIDE 125 ML/HR: 0.9 INJECTION, SOLUTION INTRAVENOUS at 04:00

## 2022-06-04 RX ADMIN — SODIUM CHLORIDE 125 ML/HR: 0.9 INJECTION, SOLUTION INTRAVENOUS at 04:05

## 2022-06-04 RX ADMIN — BRINZOLAMIDE 1 DROP: 10 SUSPENSION/ DROPS OPHTHALMIC at 21:01

## 2022-06-04 RX ADMIN — SODIUM CHLORIDE, SODIUM GLUCONATE, SODIUM ACETATE, POTASSIUM CHLORIDE, MAGNESIUM CHLORIDE, SODIUM PHOSPHATE, DIBASIC, AND POTASSIUM PHOSPHATE 500 ML: .53; .5; .37; .037; .03; .012; .00082 INJECTION, SOLUTION INTRAVENOUS at 00:22

## 2022-06-04 RX ADMIN — PANTOPRAZOLE SODIUM 40 MG: 40 INJECTION, POWDER, FOR SOLUTION INTRAVENOUS at 20:59

## 2022-06-04 RX ADMIN — SODIUM CHLORIDE 125 ML/HR: 0.9 INJECTION, SOLUTION INTRAVENOUS at 15:43

## 2022-06-04 RX ADMIN — NYSTATIN: 100000 CREAM TOPICAL at 18:30

## 2022-06-04 RX ADMIN — CEFTRIAXONE 1000 MG: 1 INJECTION, SOLUTION INTRAVENOUS at 14:17

## 2022-06-04 NOTE — ASSESSMENT & PLAN NOTE
Most likely diverticular bleed source-  GI input appreciated--Dr BURGESS [facility] following  Transfuse 2 units of packed red blood cells this morning-continue on q 8 hour H&Hs

## 2022-06-04 NOTE — PLAN OF CARE
Problem: Potential for Falls  Goal: Patient will remain free of falls  Description: INTERVENTIONS:  - Educate patient/family on patient safety including physical limitations  - Instruct patient to call for assistance with activity   - Consult OT/PT to assist with strengthening/mobility   - Keep Call bell within reach  - Keep bed low and locked with side rails adjusted as appropriate  - Keep care items and personal belongings within reach  - Initiate and maintain comfort rounds  - Make Fall Risk Sign visible to staff  - Offer Toileting every 2 Hours, in advance of need  - Initiate/Maintain bed alarm  - Obtain necessary fall risk management equipment:   - Apply yellow socks and bracelet for high fall risk patients  - Consider moving patient to room near nurses station  Outcome: Progressing     Problem: GASTROINTESTINAL - ADULT  Goal: Minimal or absence of nausea and/or vomiting  Description: INTERVENTIONS:  - Administer IV fluids if ordered to ensure adequate hydration  - Maintain NPO status until nausea and vomiting are resolved  - Nasogastric tube if ordered  - Administer ordered antiemetic medications as needed  - Provide nonpharmacologic comfort measures as appropriate  - Advance diet as tolerated, if ordered  - Consider nutrition services referral to assist patient with adequate nutrition and appropriate food choices  Outcome: Progressing  Goal: Maintains or returns to baseline bowel function  Description: INTERVENTIONS:  - Assess bowel function  - Encourage oral fluids to ensure adequate hydration  - Administer IV fluids if ordered to ensure adequate hydration  - Administer ordered medications as needed  - Encourage mobilization and activity  - Consider nutritional services referral to assist patient with adequate nutrition and appropriate food choices  Outcome: Progressing  Goal: Maintains adequate nutritional intake  Description: INTERVENTIONS:  - Monitor percentage of each meal consumed  - Identify factors contributing to decreased intake, treat as appropriate  - Assist with meals as needed  - Monitor I&O, weight, and lab values if indicated  - Obtain nutrition services referral as needed  Outcome: Progressing  Goal: Establish and maintain optimal ostomy function  Description: INTERVENTIONS:  - Assess bowel function  - Encourage oral fluids to ensure adequate hydration  - Administer IV fluids if ordered to ensure adequate hydration   - Administer ordered medications as needed  - Encourage mobilization and activity  - Nutrition services referral to assist patient with appropriate food choices  - Assess stoma site  - Consider wound care consult   Outcome: Progressing  Goal: Oral mucous membranes remain intact  Description: INTERVENTIONS  - Assess oral mucosa and hygiene practices  - Implement preventative oral hygiene regimen  - Implement oral medicated treatments as ordered  - Initiate Nutrition services referral as needed  Outcome: Progressing     Problem: GENITOURINARY - ADULT  Goal: Maintains or returns to baseline urinary function  Description: INTERVENTIONS:  - Assess urinary function  - Encourage oral fluids to ensure adequate hydration if ordered  - Administer IV fluids as ordered to ensure adequate hydration  - Administer ordered medications as needed  - Offer frequent toileting  - Follow urinary retention protocol if ordered  Outcome: Progressing  Goal: Absence of urinary retention  Description: INTERVENTIONS:  - Assess patients ability to void and empty bladder  - Monitor I/O  - Bladder scan as needed  - Discuss with physician/AP medications to alleviate retention as needed  - Discuss catheterization for long term situations as appropriate  Outcome: Progressing  Goal: Urinary catheter remains patent  Description: INTERVENTIONS:  - Assess patency of urinary catheter  - If patient has a chronic edwards, consider changing catheter if non-functioning  - Follow guidelines for intermittent irrigation of non-functioning urinary catheter  Outcome: Progressing     Problem: METABOLIC, FLUID AND ELECTROLYTES - ADULT  Goal: Electrolytes maintained within normal limits  Description: INTERVENTIONS:  - Monitor labs and assess patient for signs and symptoms of electrolyte imbalances  - Administer electrolyte replacement as ordered  - Monitor response to electrolyte replacements, including repeat lab results as appropriate  - Instruct patient on fluid and nutrition as appropriate  Outcome: Progressing  Goal: Fluid balance maintained  Description: INTERVENTIONS:  - Monitor labs   - Monitor I/O and WT  - Instruct patient on fluid and nutrition as appropriate  - Assess for signs & symptoms of volume excess or deficit  Outcome: Progressing  Goal: Glucose maintained within target range  Description: INTERVENTIONS:  - Monitor Blood Glucose as ordered  - Assess for signs and symptoms of hyperglycemia and hypoglycemia  - Administer ordered medications to maintain glucose within target range  - Assess nutritional intake and initiate nutrition service referral as needed  Outcome: Progressing     Problem: SKIN/TISSUE INTEGRITY - ADULT  Goal: Skin Integrity remains intact(Skin Breakdown Prevention)  Description: Assess:  -Perform Miguel assessment every   -Clean and moisturize skin every   -Inspect skin when repositioning, toileting, and assisting with ADLS  -Assess under medical devices such as  every   -Assess extremities for adequate circulation and sensation     Bed Management:  -Have minimal linens on bed & keep smooth, unwrinkled  -Change linens as needed when moist or perspiring  -Avoid sitting or lying in one position for more than  hours while in bed  -Keep HOB at degrees     Toileting:  -Offer bedside commode  -Assess for incontinence every   -Use incontinent care products after each incontinent episode such as     Activity:  -Mobilize patient  times a day  -Encourage activity and walks on unit  -Encourage or provide ROM exercises   -Turn and reposition patient every  Hours  -Use appropriate equipment to lift or move patient in bed  -Instruct/ Assist with weight shifting every  when out of bed in chair  -Consider limitation of chair time  hour intervals    Skin Care:  -Avoid use of baby powder, tape, friction and shearing, hot water or constrictive clothing  -Relieve pressure over bony prominences using   -Do not massage red bony areas    Next Steps:  -Teach patient strategies to minimize risks such as    -Consider consults to  interdisciplinary teams such as   Outcome: Progressing  Goal: Incision(s), wounds(s) or drain site(s) healing without S/S of infection  Description: INTERVENTIONS  - Assess and document dressing, incision, wound bed, drain sites and surrounding tissue  - Provide patient and family education  - Perform skin care/dressing changes every   Outcome: Progressing  Goal: Pressure injury heals and does not worsen  Description: Interventions:  - Implement low air loss mattress or specialty surface (Criteria met)  - Apply silicone foam dressing  - Instruct/assist with weight shifting every  minutes when in chair   - Limit chair time to  hour intervals  - Use special pressure reducing interventions such as  when in chair   - Apply fecal or urinary incontinence containment device   - Perform passive or active ROM every   - Turn and reposition patient & offload bony prominences every  hours   - Utilize friction reducing device or surface for transfers   - Consider consults to  interdisciplinary teams such as   - Use incontinent care products after each incontinent episode such as   - Consider nutrition services referral as needed  Outcome: Progressing     Problem: MUSCULOSKELETAL - ADULT  Goal: Maintain or return mobility to safest level of function  Description: INTERVENTIONS:  - Assess patient's ability to carry out ADLs; assess patient's baseline for ADL function and identify physical deficits which impact ability to perform ADLs (bathing, care of mouth/teeth, toileting, grooming, dressing, etc )  - Assess/evaluate cause of self-care deficits   - Assess range of motion  - Assess patient's mobility  - Assess patient's need for assistive devices and provide as appropriate  - Encourage maximum independence but intervene and supervise when necessary  - Involve family in performance of ADLs  - Assess for home care needs following discharge   - Consider OT consult to assist with ADL evaluation and planning for discharge  - Provide patient education as appropriate  Outcome: Progressing  Goal: Maintain proper alignment of affected body part  Description: INTERVENTIONS:  - Support, maintain and protect limb and body alignment  - Provide patient/ family with appropriate education  Outcome: Progressing     Problem: MOBILITY - ADULT  Goal: Maintain or return to baseline ADL function  Description: INTERVENTIONS:  -  Assess patient's ability to carry out ADLs; assess patient's baseline for ADL function and identify physical deficits which impact ability to perform ADLs (bathing, care of mouth/teeth, toileting, grooming, dressing, etc )  - Assess/evaluate cause of self-care deficits   - Assess range of motion  - Assess patient's mobility; develop plan if impaired  - Assess patient's need for assistive devices and provide as appropriate  - Encourage maximum independence but intervene and supervise when necessary  - Involve family in performance of ADLs  - Assess for home care needs following discharge   - Consider OT consult to assist with ADL evaluation and planning for discharge  - Provide patient education as appropriate  Outcome: Progressing  Goal: Maintains/Returns to pre admission functional level  Description: INTERVENTIONS:  - Perform BMAT or MOVE assessment daily    - Set and communicate daily mobility goal to care team and patient/family/caregiver     - Collaborate with rehabilitation services on mobility goals if consulted  - Perform Range of Motion 3 times a day  - Reposition patient every 2 hours    - Dangle patient 3 times a day  - Stand patient 3 times a day  - Ambulate patient 3 times a day  - Out of bed to chair 3 times a day   - Out of bed for meals 3 times a day  - Out of bed for toileting  - Record patient progress and toleration of activity level   Outcome: Progressing     Problem: Prexisting or High Potential for Compromised Skin Integrity  Goal: Skin integrity is maintained or improved  Description: INTERVENTIONS:  - Identify patients at risk for skin breakdown  - Assess and monitor skin integrity  - Assess and monitor nutrition and hydration status  - Monitor labs   - Assess for incontinence   - Turn and reposition patient  - Assist with mobility/ambulation  - Relieve pressure over bony prominences  - Avoid friction and shearing  - Provide appropriate hygiene as needed including keeping skin clean and dry  - Evaluate need for skin moisturizer/barrier cream  - Collaborate with interdisciplinary team   - Patient/family teaching  - Consider wound care consult   Outcome: Progressing

## 2022-06-04 NOTE — CONSULTS
Consultation - [id number] [facility]     Shawnee Daley 80 y o  female MRN: [id number]  Unit/Bed#: -01 Encounter: [id number]    Consults    ASSESSMENT and PLAN    1  Acute anemia, with 1 episode of rectal bleeding  CT abdomen and pelvis negative  CT head negative    Check iron panels and give IV iron p r n  Rectal exam with brown stool     - trend H&H and transfuse to hemoglobin greater than 8  - okay to do PPI 40 mg daily empirically  - follow-up iron panel and transfuse IV iron p r n   - given other comorbidities, advanced age, would defer on any endoscopic evaluation at this point    2  Lower abdominal pain, concerns for cystitis, on antibiotics  CT abdomen pelvis does not show diverticulitis, only uninflamed diverticulosis  3  Encephalopathy, metabolic - increasingly lethargic with an episode of unresponsiveness this morning her daughter  Neuro checks  Chief Complaint   Patient presents with    Altered Mental Status       Physician Requesting Consult: Sherman Burr, DO    HPI  Shawnee Daley is a 8 y o  year old female past medical history significant for AFib, only on metoprolol with no anticoagulation, who presents from an assisted living facility with 1 episode of bright red blood per rectum  According to daughter, patient also has some lower abdominal discomfort  Currently, patient is lethargic and not answering questions  Chart reviewed  Patient is DNR level 3  No further bleeding since admission  Lab and CT reviewed  Positive UTI on antibiotics  Historical Information   Past Medical History:   Diagnosis Date    A-fib [facility])     Gastrointestinal hemorrhage associated with intestinal diverticulitis     Osteoarthritis     UTI (urinary tract infection)      History reviewed  No pertinent surgical history    Social History   Social History     Substance and Sexual Activity   Alcohol Use Never     Social History     Substance and Sexual Activity Drug Use No     Social History     Tobacco Use   Smoking Status Former Smoker   Smokeless Tobacco Never Used     Family History   Problem Relation Age of Onset    No Known Problems Mother     No Known Problems Father        Meds/Allergies     Current Facility-Administered Medications   Medication Dose Route Frequency    albuterol (PROVENTIL HFA,VENTOLIN HFA) inhaler 2 puff  2 puff Inhalation Q4H PRN    aluminum-magnesium hydroxide-simethicone (MYLANTA) oral suspension 30 mL  30 mL Oral Q6H PRN    brinzolamide (AZOPT) 1 % ophthalmic suspension 1 drop  1 drop Both Eyes HS    [START ON 6/4/2022] cefTRIAXone (ROCEPHIN) IVPB (premix in dextrose) 1,000 mg 50 mL  1,000 mg Intravenous Daily    docusate sodium (COLACE) capsule 100 mg  100 mg Oral BID    [START ON 6/4/2022] metoprolol succinate (TOPROL-XL) 24 hr tablet 100 mg  100 mg Oral Daily    nystatin (MYCOSTATIN) cream   Topical BID    ondansetron (ZOFRAN) injection 4 mg  4 mg Intravenous Q6H PRN    pantoprazole (PROTONIX) injection 40 mg  40 mg Intravenous Q12H    [START ON 6/4/2022] PARoxetine (PAXIL) tablet 20 mg  20 mg Oral QAM    sodium chloride (PF) 0 9 % injection 3 mL  3 mL Intravenous Q8H [address]    sodium chloride 0 9 % infusion  125 mL/hr Intravenous Continuous     Medications Prior to Admission   Medication    albuterol (PROAIR HFA) 90 mcg/act inhaler    brinzolamide (AZOPT) 1 % ophthalmic suspension    losartan (COZAAR) 100 MG tablet    metoprolol succinate (TOPROL-XL) 100 mg 24 hr tablet    nystatin (MYCOSTATIN) cream    omeprazole (PriLOSEC) 20 mg delayed release capsule    PARoxetine (PAXIL) 30 mg tablet       Allergies   Allergen Reactions    Sulfa Antibiotics Rash     per t sys    Sulfamethoxazole-Trimethoprim Rash       PHYSICAL EXAM    Blood pressure 119/69, pulse (!) 106, temperature 98 3 °F (36 8 °C), resp  rate (!) 24, height 5' 4" (1 626 m), weight 62 kg (136 lb 11 oz), SpO2 93 %, not currently breastfeeding   Body mass index is 23 46 kg/m²  General Appearance: NAD, lethargic, not following commands, opens eyes to voice  Eyes: Anicteric, PERRL  ENT:  Normocephalic, atraumatic, normal mucosa  Neck:  Supple, symmetrical, trachea midline  Resp:  Clear to auscultation bilaterally; no rales, rhonchi or wheezing; respirations unlabored   CV:  S1 S2, Regular rate and rhythm; no murmur, rub, or gallop  GI:  Soft, non-tender, non-distended; normal bowel sounds; no masses, no organomegaly   Rectal:  Brown stool  Musculoskeletal: No cyanosis, clubbing or edema  Limited range of motion, patient is although the under the blanket  Skin:  No jaundice, rashes, or lesions   Heme/Lymph: No palpable cervical lymphadenopathy  Psych:  Disoriented affect, poor eye contact  Neuro: No gross deficits, nonverbal    Lab Results   Component Value Date    GLUCOSE 147 (H) 06/03/2015    CALCIUM 9 3 06/03/2022     (L) 06/03/2015    K 4 2 06/03/2022    CO2 27 06/03/2022     06/03/2022    BUN 20 06/03/2022    CREATININE 0 87 06/03/2022     Lab Results   Component Value Date    WBC 9 25 06/03/2022    HGB 8 5 (L) 06/03/2022    HCT 26 8 (L) 06/03/2022     (H) 06/03/2022     06/03/2022     Lab Results   Component Value Date    ALT 8 (L) 06/03/2022    AST 12 06/03/2022    ALKPHOS 73 06/03/2022    BILITOT 0 4 06/03/2015     No results found for: AMYLASE  Lab Results   Component Value Date    LIPASE 62 (L) 06/03/2022     No results found for: IRON, TIBC, FERRITIN  Lab Results   Component Value Date    INR 1 06 06/03/2022       Imaging Studies: I have personally reviewed pertinent reports  and I have personally reviewed pertinent films in PACS    EKG, Pathology, and Other Studies: I have personally reviewed pertinent reports     and I have personally reviewed pertinent films in PACS    REVIEW OF SYSTEMS:  Unable to obtain due to mental status

## 2022-06-04 NOTE — PROGRESS NOTES
[place]  Progress Note - Betzaida Vogel Dornsife 6/10/1921, 80 y o  female MRN: [id number]  Unit/Bed#: -01 Encounter: [id number]  Primary Care Provider: Alexandria Mora MD   Date and time admitted to hospital: 6/3/2022 12:56 PM    Acute diverticulitis  Assessment & Plan  Noted on ct- will -continue antibioitcs   GI input appreciated felt to be diverticular bleed but no evidence of perforation or definite inflammation    Cystitis  Assessment & Plan  Urine and blood cultures are pending  Day # 2 of empiric antibioics with ceftriaxone    Encephalopathy, metabolic  Assessment & Plan  More lethargic since yesterday and then had an episode of unresponsiveness this am at assisted living facility     Now much more responsive this morning and has ongoing rambling speech about her flowers and singing in [place]  Will discontinue neuro checks                                           * Sepsis ([address] )  Assessment & Plan  Patient with fever and altered mental status in setting  also acute cystitis with hematuria   elevated lactic aci level - continue to trend   being given 2nd iv bolus by Er staff  Blood pressure stabilized  Continue to trend CBC    Mild intermittent asthma without complication  Assessment & Plan  Patient without signs of sob- contintinue usual meds    Acute blood loss anemia  Assessment & Plan  Most likely diverticular bleed source-  GI input appreciated--Dr Bettina Jeffries following  Transfuse 2 units of packed red blood cells this morning-continue on q 8 hour H&Hs         VTE Prophylaxis: in place    Patient Centered Rounds: I rounded with patient's nurse    Current Length of Stay: 1 day(s)    Current Patient Status: Inpatient    Certification Statement: Pt requires additional inpatient hospital stay due to: see assessment and plan        Subjective:   Conversant and denies abdominal pain or sob  Rambling   conversation       All other ROS are negative    Objective:     Vitals:   Temp (24hrs), Av 4 °F (36 9 °C), Min:97 2 °F (36 2 °C), Max:99 7 °F (37 6 °C)    Temp:  [97 2 °F (36 2 °C)-99 7 °F (37 6 °C)] 98 6 °F (37 °C)  HR:  [] 80  Resp:  [18-24] 18  BP: (113-159)/(59-89) 115/59  SpO2:  [91 %-96 %] 94 %  Body mass index is 21 34 kg/m²  Input and Output Summary (last 24 hours): Intake/Output Summary (Last 24 hours) at 2022 1451  Last data filed at 2022 1328  Gross per 24 hour   Intake 1247 92 ml   Output 600 ml   Net 647 92 ml       Physical Exam:     Physical Exam  Vitals and nursing note reviewed  Constitutional:       General: She is not in acute distress  Comments: Much more alert with spontaneous conversation   Eyes:      General:         Right eye: No discharge  Left eye: No discharge  Cardiovascular:      Rate and Rhythm: Tachycardia present  Heart sounds: No murmur heard  Pulmonary:      Breath sounds: No wheezing  Abdominal:      Comments: Mild generalized fullness-no rebound noted   Neurological:      Mental Status: Mental status is at baseline  Comments: Moderate dementia   Psychiatric:         Mood and Affect: Mood normal          Thought Content: Thought content normal              I personally reviewed labs and imaging reports for today        Last 24 Hours Medication List:   Current Facility-Administered Medications   Medication Dose Route Frequency Provider Last Rate    albuterol  2 puff Inhalation Q4H PRN Yee Fly, DO      aluminum-magnesium hydroxide-simethicone  30 mL Oral Q6H PRN Yee Fly, DO      brinzolamide  1 drop Both Eyes HS Yee Fly, DO      cefTRIAXone  1,000 mg Intravenous Daily Yee Fly, DO 1,000 mg (22 1417)    docusate sodium  100 mg Oral BID Smiley Grandchild, DO      metoprolol  5 mg Intravenous Q6H PRN Smiley Grandchild, DO      nystatin   Topical BID Yee Fly, DO      ondansetron  4 mg Intravenous Q6H PRN Yee Fly, DO      pantoprazole  40 mg Intravenous Q12H Yee Fly, DO      PARoxetine  20 mg Oral LEAH Shaw Fly, DO      sodium chloride (PF)  3 mL Intravenous Q8H [address] Yee Fly, DO      sodium chloride  125 mL/hr Intravenous Continuous More Fast,  mL/hr (06/04/22 2645)   Daughter Stanley Portillo is at bedside and updated in status       Today, Patient Was Seen By: Morenabil Mcneal, DO    ** Please Note: Dictation voice to text software may have been used in the creation of this document   **

## 2022-06-04 NOTE — ASSESSMENT & PLAN NOTE
Patient with fever and altered mental status in setting  also acute cystitis with hematuria   elevated lactic aci level - continue to trend   being given 2nd iv bolus by Er staff  Blood pressure stabilized  Continue to trend CBC

## 2022-06-04 NOTE — PROGRESS NOTES
Progress note - Gastroenterology   [facility] MRN: [id number]  Unit/Bed#: -01 Encounter: [id number]    ASSESSMENT and PLAN    1  Acute anemia, with now 2nd episode of rectal bleeding  CT abdomen and pelvis negative except for diverticulosis  CT head negative    Check iron panels and give IV iron p r n  Yadi Dinero episode of maroon stool this morning  Appears this is likely secondary to a diverticular bleed  Will treat with supportive care      - trend H&H and transfuse to hemoglobin greater than 8  - primary team order 1 unit of blood today given hemoglobin 7 3  - okay to do PPI 40 mg daily empirically  - follow-up iron panel and transfuse IV iron p r n   - given other comorbidities, advanced age, would defer on any endoscopic evaluation at this point     2  Lower abdominal pain, concerns for cystitis, on antibiotics  CT abdomen pelvis does not show diverticulitis, only uninflamed diverticulosis      3  Encephalopathy, metabolic - increasingly lethargic with an episode of unresponsiveness this morning her daughter  Neuro checks  Chief Complaint   Patient presents with    Altered Mental Status       SUBJECTIVE/HPI   Had episode of maroon bloody bowel movement per nurse  No hemodynamic compromise  /89   Pulse (!) 108   Temp 99 5 °F (37 5 °C)   Resp 20   Ht 5' 4" (1 626 m)   Wt 56 4 kg (124 lb 5 4 oz)   SpO2 96%   BMI 21 34 kg/m²     PHYSICALEXAM  General Appearance: NAD, lethargic, not following commands, opens eyes to voice  Eyes: Anicteric, PERRL  ENT:  Normocephalic, atraumatic, normal mucosa     Neck:    Supple, symmetrical, trachea midline  Resp:  Clear to auscultation bilaterally; no rales, rhonchi or wheezing; respirations unlabored   CV:  S1 S2, Regular rate and rhythm; no murmur, rub, or gallop    GI:  Soft, non-tender, non-distended; normal bowel sounds; no masses, no organomegaly   Rectal:  Brown stool  Musculoskeletal: No cyanosis, clubbing or edema  Limited range of motion, patient is although the under the blanket    Skin:     No jaundice, rashes, or lesions   Heme/Lymph: No palpable cervical lymphadenopathy  Psych:  Disoriented affect, poor eye contact  Neuro: No gross deficits, nonverbal    Lab Results   Component Value Date    GLUCOSE 147 (H) 06/03/2015    CALCIUM 8 1 (L) 06/04/2022     (L) 06/03/2015    K 4 1 06/04/2022    CO2 23 06/04/2022     (H) 06/04/2022    BUN 20 06/04/2022    CREATININE 0 77 06/04/2022     Lab Results   Component Value Date    WBC 9 17 06/04/2022    HGB 7 3 (L) 06/04/2022    HCT 23 8 (L) 06/04/2022     (H) 06/04/2022     06/04/2022     Lab Results   Component Value Date    ALT 10 (L) 06/04/2022    AST 16 06/04/2022    ALKPHOS 53 06/04/2022    BILITOT 0 4 06/03/2015     No results found for: AMYLASE  Lab Results   Component Value Date    LIPASE 62 (L) 06/03/2022     No results found for: IRON, TIBC, FERRITIN  Lab Results   Component Value Date    INR 1 06 06/03/2022

## 2022-06-04 NOTE — ASSESSMENT & PLAN NOTE
Noted on ct- will -continue antibioitcs     GI input appreciated felt to be diverticular bleed but no evidence of perforation or definite inflammation

## 2022-06-04 NOTE — PLAN OF CARE
Problem: Potential for Falls  Goal: Patient will remain free of falls  Description: INTERVENTIONS:  - Educate patient/family on patient safety including physical limitations  - Instruct patient to call for assistance with activity   - Consult OT/PT to assist with strengthening/mobility   - Keep Call bell within reach  - Keep bed low and locked with side rails adjusted as appropriate  - Keep care items and personal belongings within reach  - Initiate and maintain comfort rounds  - Make Fall Risk Sign visible to staff  - Offer Toileting every 2 Hours, in advance of need  - Initiate/Maintain bed alarm  - Obtain necessary fall risk management equipment: nonskid footwear  - Apply yellow socks and bracelet for high fall risk patients  - Consider moving patient to room near nurses station  Outcome: Progressing     Problem: GASTROINTESTINAL - ADULT  Goal: Minimal or absence of nausea and/or vomiting  Description: INTERVENTIONS:  - Administer IV fluids if ordered to ensure adequate hydration  - Maintain NPO status until nausea and vomiting are resolved  - Nasogastric tube if ordered  - Administer ordered antiemetic medications as needed  - Provide nonpharmacologic comfort measures as appropriate  - Advance diet as tolerated, if ordered  - Consider nutrition services referral to assist patient with adequate nutrition and appropriate food choices  Outcome: Progressing  Goal: Maintains or returns to baseline bowel function  Description: INTERVENTIONS:  - Assess bowel function  - Encourage oral fluids to ensure adequate hydration  - Administer IV fluids if ordered to ensure adequate hydration  - Administer ordered medications as needed  - Encourage mobilization and activity  - Consider nutritional services referral to assist patient with adequate nutrition and appropriate food choices  Outcome: Progressing  Goal: Maintains adequate nutritional intake  Description: INTERVENTIONS:  - Monitor percentage of each meal consumed  - Identify factors contributing to decreased intake, treat as appropriate  - Assist with meals as needed  - Monitor I&O, weight, and lab values if indicated  - Obtain nutrition services referral as needed  Outcome: Progressing  Goal: Establish and maintain optimal ostomy function  Description: INTERVENTIONS:  - Assess bowel function  - Encourage oral fluids to ensure adequate hydration  - Administer IV fluids if ordered to ensure adequate hydration   - Administer ordered medications as needed  - Encourage mobilization and activity  - Nutrition services referral to assist patient with appropriate food choices  - Assess stoma site  - Consider wound care consult   Outcome: Progressing  Goal: Oral mucous membranes remain intact  Description: INTERVENTIONS  - Assess oral mucosa and hygiene practices  - Implement preventative oral hygiene regimen  - Implement oral medicated treatments as ordered  - Initiate Nutrition services referral as needed  Outcome: Progressing     Problem: GENITOURINARY - ADULT  Goal: Maintains or returns to baseline urinary function  Description: INTERVENTIONS:  - Assess urinary function  - Encourage oral fluids to ensure adequate hydration if ordered  - Administer IV fluids as ordered to ensure adequate hydration  - Administer ordered medications as needed  - Offer frequent toileting  - Follow urinary retention protocol if ordered  Outcome: Progressing  Goal: Absence of urinary retention  Description: INTERVENTIONS:  - Assess patients ability to void and empty bladder  - Monitor I/O  - Bladder scan as needed  - Discuss with physician/AP medications to alleviate retention as needed  - Discuss catheterization for long term situations as appropriate  Outcome: Progressing  Goal: Urinary catheter remains patent  Description: INTERVENTIONS:  - Assess patency of urinary catheter  - If patient has a chronic edwards, consider changing catheter if non-functioning  - Follow guidelines for intermittent irrigation of non-functioning urinary catheter  Outcome: Progressing     Problem: METABOLIC, FLUID AND ELECTROLYTES - ADULT  Goal: Electrolytes maintained within normal limits  Description: INTERVENTIONS:  - Monitor labs and assess patient for signs and symptoms of electrolyte imbalances  - Administer electrolyte replacement as ordered  - Monitor response to electrolyte replacements, including repeat lab results as appropriate  - Instruct patient on fluid and nutrition as appropriate  Outcome: Progressing  Goal: Fluid balance maintained  Description: INTERVENTIONS:  - Monitor labs   - Monitor I/O and WT  - Instruct patient on fluid and nutrition as appropriate  - Assess for signs & symptoms of volume excess or deficit  Outcome: Progressing  Goal: Glucose maintained within target range  Description: INTERVENTIONS:  - Monitor Blood Glucose as ordered  - Assess for signs and symptoms of hyperglycemia and hypoglycemia  - Administer ordered medications to maintain glucose within target range  - Assess nutritional intake and initiate nutrition service referral as needed  Outcome: Progressing     Problem: MUSCULOSKELETAL - ADULT  Goal: Maintain or return mobility to safest level of function  Description: INTERVENTIONS:  - Assess patient's ability to carry out ADLs; assess patient's baseline for ADL function and identify physical deficits which impact ability to perform ADLs (bathing, care of mouth/teeth, toileting, grooming, dressing, etc )  - Assess/evaluate cause of self-care deficits   - Assess range of motion  - Assess patient's mobility  - Assess patient's need for assistive devices and provide as appropriate  - Encourage maximum independence but intervene and supervise when necessary  - Involve family in performance of ADLs  - Assess for home care needs following discharge   - Consider OT consult to assist with ADL evaluation and planning for discharge  - Provide patient education as appropriate  Outcome: Progressing  Goal: Maintain proper alignment of affected body part  Description: INTERVENTIONS:  - Support, maintain and protect limb and body alignment  - Provide patient/ family with appropriate education  Outcome: Progressing     Problem: MOBILITY - ADULT  Goal: Maintain or return to baseline ADL function  Description: INTERVENTIONS:  -  Assess patient's ability to carry out ADLs; assess patient's baseline for ADL function and identify physical deficits which impact ability to perform ADLs (bathing, care of mouth/teeth, toileting, grooming, dressing, etc )  - Assess/evaluate cause of self-care deficits   - Assess range of motion  - Assess patient's mobility; develop plan if impaired  - Assess patient's need for assistive devices and provide as appropriate  - Encourage maximum independence but intervene and supervise when necessary  - Involve family in performance of ADLs  - Assess for home care needs following discharge   - Consider OT consult to assist with ADL evaluation and planning for discharge  - Provide patient education as appropriate  Outcome: Progressing     Problem: Prexisting or High Potential for Compromised Skin Integrity  Goal: Skin integrity is maintained or improved  Description: INTERVENTIONS:  - Identify patients at risk for skin breakdown  - Assess and monitor skin integrity  - Assess and monitor nutrition and hydration status  - Monitor labs   - Assess for incontinence   - Turn and reposition patient  - Assist with mobility/ambulation  - Relieve pressure over bony prominences  - Avoid friction and shearing  - Provide appropriate hygiene as needed including keeping skin clean and dry  - Evaluate need for skin moisturizer/barrier cream  - Collaborate with interdisciplinary team   - Patient/family teaching  - Consider wound care consult   Outcome: Progressing

## 2022-06-04 NOTE — ASSESSMENT & PLAN NOTE
More lethargic since yesterday and then had an episode of unresponsiveness this am at assisted living facility      Now much more responsive this morning and has ongoing rambling speech about her flowers and singing in [place]  Will discontinue neuro checks

## 2022-06-05 LAB
ABO GROUP BLD BPU: NORMAL
ABO GROUP BLD BPU: NORMAL
ALBUMIN SERPL BCP-MCNC: 2.3 G/DL (ref 3.5–5)
ALP SERPL-CCNC: 54 U/L (ref 46–116)
ALT SERPL W P-5'-P-CCNC: 9 U/L (ref 12–78)
ANION GAP SERPL CALCULATED.3IONS-SCNC: 8 MMOL/L (ref 4–13)
AST SERPL W P-5'-P-CCNC: 24 U/L (ref 5–45)
BACTERIA UR CULT: ABNORMAL
BACTERIA UR CULT: ABNORMAL
BILIRUB SERPL-MCNC: 0.5 MG/DL (ref 0.2–1)
BPU ID: NORMAL
BPU ID: NORMAL
BUN SERPL-MCNC: 19 MG/DL (ref 5–25)
CALCIUM ALBUM COR SERPL-MCNC: 9.7 MG/DL (ref 8.3–10.1)
CALCIUM SERPL-MCNC: 8.3 MG/DL (ref 8.3–10.1)
CHLORIDE SERPL-SCNC: 115 MMOL/L (ref 100–108)
CO2 SERPL-SCNC: 23 MMOL/L (ref 21–32)
CREAT SERPL-MCNC: 0.81 MG/DL (ref 0.6–1.3)
CROSSMATCH: NORMAL
CROSSMATCH: NORMAL
ERYTHROCYTE [DISTWIDTH] IN BLOOD BY AUTOMATED COUNT: 15.9 % (ref 11.6–15.1)
GFR SERPL CREATININE-BSD FRML MDRD: 59 ML/MIN/1.73SQ M
GLUCOSE SERPL-MCNC: 123 MG/DL (ref 65–140)
HCT VFR BLD AUTO: 21.5 % (ref 34.8–46.1)
HCT VFR BLD AUTO: 23.7 % (ref 34.8–46.1)
HCT VFR BLD AUTO: 27.6 % (ref 34.8–46.1)
HGB BLD-MCNC: 6.9 G/DL (ref 11.5–15.4)
HGB BLD-MCNC: 7.6 G/DL (ref 11.5–15.4)
HGB BLD-MCNC: 9.1 G/DL (ref 11.5–15.4)
MCH RBC QN AUTO: 30.4 PG (ref 26.8–34.3)
MCHC RBC AUTO-ENTMCNC: 33 G/DL (ref 31.4–37.4)
MCV RBC AUTO: 92 FL (ref 82–98)
PLATELET # BLD AUTO: 202 THOUSANDS/UL (ref 149–390)
PMV BLD AUTO: 10 FL (ref 8.9–12.7)
POTASSIUM SERPL-SCNC: 3.4 MMOL/L (ref 3.5–5.3)
PROT SERPL-MCNC: 4.7 G/DL (ref 6.4–8.2)
RBC # BLD AUTO: 2.99 MILLION/UL (ref 3.81–5.12)
SODIUM SERPL-SCNC: 146 MMOL/L (ref 136–145)
UNIT DISPENSE STATUS: NORMAL
UNIT DISPENSE STATUS: NORMAL
UNIT PRODUCT CODE: NORMAL
UNIT PRODUCT CODE: NORMAL
UNIT PRODUCT VOLUME: 350 ML
UNIT PRODUCT VOLUME: 350 ML
UNIT RH: NORMAL
UNIT RH: NORMAL
WBC # BLD AUTO: 11.28 THOUSAND/UL (ref 4.31–10.16)

## 2022-06-05 PROCEDURE — 99232 SBSQ HOSP IP/OBS MODERATE 35: CPT | Performed by: INTERNAL MEDICINE

## 2022-06-05 PROCEDURE — 85018 HEMOGLOBIN: CPT | Performed by: PHYSICIAN ASSISTANT

## 2022-06-05 PROCEDURE — 85027 COMPLETE CBC AUTOMATED: CPT | Performed by: INTERNAL MEDICINE

## 2022-06-05 PROCEDURE — 85014 HEMATOCRIT: CPT | Performed by: PHYSICIAN ASSISTANT

## 2022-06-05 PROCEDURE — 87040 BLOOD CULTURE FOR BACTERIA: CPT | Performed by: PHYSICIAN ASSISTANT

## 2022-06-05 PROCEDURE — 80053 COMPREHEN METABOLIC PANEL: CPT | Performed by: INTERNAL MEDICINE

## 2022-06-05 PROCEDURE — P9040 RBC LEUKOREDUCED IRRADIATED: HCPCS

## 2022-06-05 RX ORDER — PANTOPRAZOLE SODIUM 40 MG/1
40 TABLET, DELAYED RELEASE ORAL
Status: DISCONTINUED | OUTPATIENT
Start: 2022-06-05 | End: 2022-06-07

## 2022-06-05 RX ORDER — METOPROLOL SUCCINATE 50 MG/1
100 TABLET, EXTENDED RELEASE ORAL DAILY
Status: DISCONTINUED | OUTPATIENT
Start: 2022-06-05 | End: 2022-06-06

## 2022-06-05 RX ADMIN — SODIUM CHLORIDE 125 ML/HR: 0.9 INJECTION, SOLUTION INTRAVENOUS at 03:31

## 2022-06-05 RX ADMIN — PAROXETINE HYDROCHLORIDE 20 MG: 20 TABLET, FILM COATED ORAL at 09:21

## 2022-06-05 RX ADMIN — NYSTATIN: 100000 CREAM TOPICAL at 09:30

## 2022-06-05 RX ADMIN — CEFTRIAXONE 1000 MG: 1 INJECTION, SOLUTION INTRAVENOUS at 13:49

## 2022-06-05 RX ADMIN — METOPROLOL SUCCINATE 100 MG: 50 TABLET, EXTENDED RELEASE ORAL at 09:30

## 2022-06-05 RX ADMIN — PANTOPRAZOLE SODIUM 40 MG: 40 TABLET, DELAYED RELEASE ORAL at 09:30

## 2022-06-05 NOTE — PLAN OF CARE
Problem: Potential for Falls  Goal: Patient will remain free of falls  Description: INTERVENTIONS:  - Educate patient/family on patient safety including physical limitations  - Instruct patient to call for assistance with activity   - Consult OT/PT to assist with strengthening/mobility   - Keep Call bell within reach  - Keep bed low and locked with side rails adjusted as appropriate  - Keep care items and personal belongings within reach  - Initiate and maintain comfort rounds  - Make Fall Risk Sign visible to staff  - Offer Toileting every 2 Hours, in advance of need  - Initiate/Maintain bed alarm  - Obtain necessary fall risk management equipment:   - Apply yellow socks and bracelet for high fall risk patients  - Consider moving patient to room near nurses station  Outcome: Progressing     Problem: GASTROINTESTINAL - ADULT  Goal: Minimal or absence of nausea and/or vomiting  Description: INTERVENTIONS:  - Administer IV fluids if ordered to ensure adequate hydration  - Maintain NPO status until nausea and vomiting are resolved  - Nasogastric tube if ordered  - Administer ordered antiemetic medications as needed  - Provide nonpharmacologic comfort measures as appropriate  - Advance diet as tolerated, if ordered  - Consider nutrition services referral to assist patient with adequate nutrition and appropriate food choices  Outcome: Progressing     Problem: GASTROINTESTINAL - ADULT  Goal: Maintains or returns to baseline bowel function  Description: INTERVENTIONS:  - Assess bowel function  - Encourage oral fluids to ensure adequate hydration  - Administer IV fluids if ordered to ensure adequate hydration  - Administer ordered medications as needed  - Encourage mobilization and activity  - Consider nutritional services referral to assist patient with adequate nutrition and appropriate food choices  Outcome: Progressing     Problem: GASTROINTESTINAL - ADULT  Goal: Maintains adequate nutritional intake  Description: INTERVENTIONS:  - Monitor percentage of each meal consumed  - Identify factors contributing to decreased intake, treat as appropriate  - Assist with meals as needed  - Monitor I&O, weight, and lab values if indicated  - Obtain nutrition services referral as needed  Outcome: Progressing     Problem: GENITOURINARY - ADULT  Goal: Maintains or returns to baseline urinary function  Description: INTERVENTIONS:  - Assess urinary function  - Encourage oral fluids to ensure adequate hydration if ordered  - Administer IV fluids as ordered to ensure adequate hydration  - Administer ordered medications as needed  - Offer frequent toileting  - Follow urinary retention protocol if ordered  Outcome: Progressing     Problem: GENITOURINARY - ADULT  Goal: Absence of urinary retention  Description: INTERVENTIONS:  - Assess patients ability to void and empty bladder  - Monitor I/O  - Bladder scan as needed  - Discuss with physician/AP medications to alleviate retention as needed  - Discuss catheterization for long term situations as appropriate  Outcome: Progressing     Problem: METABOLIC, FLUID AND ELECTROLYTES - ADULT  Goal: Electrolytes maintained within normal limits  Description: INTERVENTIONS:  - Monitor labs and assess patient for signs and symptoms of electrolyte imbalances  - Administer electrolyte replacement as ordered  - Monitor response to electrolyte replacements, including repeat lab results as appropriate  - Instruct patient on fluid and nutrition as appropriate  Outcome: Progressing

## 2022-06-05 NOTE — ASSESSMENT & PLAN NOTE
Noted on ct- will -continue antibioitcs       GI input appreciated felt to be diverticular bleed but no evidence of perforation   Likely source of GI bleed

## 2022-06-05 NOTE — PROGRESS NOTES
[place]  Progress Note - Rhonda Daley 6/10/1921, 80 y o  female MRN: [id number]  Unit/Bed#: -[place] Encounter: [id number]  Primary Care Provider: Ranjeet Mendoza MD   Date and time admitted to hospital: 6/3/2022 12:56 PM    Acute diverticulitis  Assessment & Plan  Noted on ct- will -continue antibioitcs     GI input appreciated felt to be diverticular bleed but no evidence of perforation   Likely source of GI bleed    Cystitis  Assessment & Plan  Urine culture with greater than 100,000 colonies of Gram-negative rods  One of 2 blood cultures positive for g positives-question whether this is contaminant   Day #3 of empiric antibioics with ceftriaxone    Encephalopathy, metabolic  Assessment & Plan  More lethargic on day prior to admission and then had an episode of unresponsiveness on a m  Of admission at assisted living facility      Now much more responsive  and commenting about the sunshine this morningsunshine in the [place] this morning                                         * Sepsis [facility])  Assessment & Plan  Patient with fever and altered mental status in setting  also acute cystitis with hematuria   elevated lactic aci level -on presentation  being given 2nd iv bolus by Er staff  Blood pressure stabilized  1/2 blood cultures are positive-repeat blood cultures ordered this morning-possible contaminant    Mild intermittent asthma without complication  Assessment & Plan  Patient without signs of sob- contintinue usual meds    Acute blood loss anemia  Assessment & Plan  Most likely diverticular bleed source-  GI input appreciated--Dr Kyra page following  Transfused 2 units of packed red blood cells yesterday with hemoglobin 9 1 and hematocrit 27 6 this a  Continue to trend    Rectal bleeding  Assessment & Plan  Gi consult appreciated-had 3 small stools overnight  Hope to avoid endoscopy if possible           VTE Prophylaxis: in place    Patient Centered Rounds: I rounded with patient's nurse    Current Length of Stay: 2 day(s)    Current Patient Status: Inpatient    Certification Statement: Pt requires additional inpatient hospital stay due to: see assessment and plan        Subjective:    Pleasant this morning with her usual baseline confusion  No complaints of abdominal pain or nausea    All other ROS are negative    Objective:     Vitals:   Temp (24hrs), Av 6 °F (37 °C), Min:96 8 °F (36 °C), Max:99 7 °F (37 6 °C)    Temp:  [96 8 °F (36 °C)-99 7 °F (37 6 °C)] 96 8 °F (36 °C)  HR:  [] 73  Resp:  [18-20] 20  BP: (113-143)/(59-91) 143/91  SpO2:  [91 %-98 %] 98 %  Body mass index is 21 72 kg/m²  Input and Output Summary (last 24 hours): Intake/Output Summary (Last 24 hours) at 2022 0855  Last data filed at 2022 0644  Gross per 24 hour   Intake 2652 ml   Output 825 ml   Net 1827 ml       Physical Exam:     Physical Exam  Vitals and nursing note reviewed  Eyes:      General: No scleral icterus  Cardiovascular:      Rate and Rhythm: Normal rate and regular rhythm  Pulmonary:      Breath sounds: No wheezing or rhonchi  Abdominal:      General: There is no distension  Palpations: Abdomen is soft  Tenderness: There is no abdominal tenderness  Musculoskeletal:         General: No swelling  Right lower leg: No edema  Left lower leg: No edema  Neurological:      Mental Status: She is alert  I personally reviewed labs and imaging reports for today        Last 24 Hours Medication List:   Current Facility-Administered Medications   Medication Dose Route Frequency Provider Last Rate    albuterol  2 puff Inhalation Q4H PRN Yee Fly, DO      aluminum-magnesium hydroxide-simethicone  30 mL Oral Q6H PRN Yee Fly, DO      brinzolamide  1 drop Both Eyes HS Yee Fly, DO      cefTRIAXone  1,000 mg Intravenous Daily Yee Fly, DO 1,000 mg (22 1417)    docusate sodium  100 mg Oral BID Yee Fly, DO      metoprolol  5 mg Intravenous Q6H PRN Justin Moses, DO      nystatin   Topical BID Justin Moses, DO      ondansetron  4 mg Intravenous Q6H PRN Justin Moses, DO      pantoprazole  40 mg Intravenous Q12H Yee Fly, DO      PARoxetine  20 mg Oral QAM Yee Fly, DO      sodium chloride (PF)  3 mL Intravenous Q8H [address] Yee Fly, DO      sodium chloride  125 mL/hr Intravenous Continuous Yee Fly,  mL/hr (06/05/22 0331)          Today, Patient Was Seen By: Justin Moses DO    ** Please Note: Dictation voice to text software may have been used in the creation of this document   **

## 2022-06-05 NOTE — PROGRESS NOTES
Progress note - Gastroenterology   Rhonda Loydcalista Edi 80 y o  female MRN: [id number]  Unit/Bed#: -01 Encounter: [id number]    ASSESSMENT and PLAN    1  Acute anemia, with now 2nd episode of rectal bleeding  CT abdomen and pelvis negative except for diverticulosis  CT head negative    Check iron panels and give IV iron p r n  Verito Aspen episode of maroon stool 6/4/22 am   Small BM 6/5/22, but not reportedly bloody  Appears this is likely secondary to a diverticular bleed  We have bee treating with supportive care  Hgb dropped to 7 6 from 9 1  No observable brisk bleeding to explain, but did have noted maroon stools yesterfday      - trend H&H and transfuse to hemoglobin greater than 8  - okay to do PPI 40 mg daily empirically  - iron 32  Sat 63%, but TIBC very low  Ferritin 64   - given other comorbidities, advanced age, would defer on any endoscopic evaluation at this point, however if hemoglobin continues to drop and/or there is evidence of brisk bleeding, may need to consider prepping and colonoscopy vs IR based therapy      2  Lower abdominal pain, concerns for cystitis, on antibiotics  CT abdomen pelvis does not show diverticulitis, only uninflamed diverticulosis      3  Encephalopathy, metabolic - increasingly lethargic with an episode of unresponsiveness this morning her daughter  Neuro checks  Chief Complaint   Patient presents with    Altered Mental Status       SUBJECTIVE/HPI   Resting comfortable with family bedside  One small BM reported, no reported blood  No hemodynamic compromise      /91   Pulse 73   Temp (!) 96 8 °F (36 °C)   Resp 20   Ht 5' 4" (1 626 m)   Wt 57 4 kg (126 lb 8 7 oz)   SpO2 98%   BMI 21 72 kg/m²     PHYSICALEXAM  General Appearance: NAD, lethargic, not following commands, opens eyes to voice  Eyes: Anicteric, PERRL  ENT:  Normocephalic, atraumatic, normal mucosa     Neck:    Supple, symmetrical, trachea midline  Resp:  Clear to auscultation bilaterally; no rales, rhonchi or wheezing; respirations unlabored   CV:  S1 S2, Regular rate and rhythm; no murmur, rub, or gallop  GI:  Soft, non-tender, non-distended; normal bowel sounds; no masses, no organomegaly   Rectal:  Brown stool  Musculoskeletal: No cyanosis, clubbing or edema  Limited range of motion, patient is although the under the blanket    Skin:     No jaundice, rashes, or lesions   Heme/Lymph: No palpable cervical lymphadenopathy  Psych:  Disoriented affect, poor eye contact  Neuro: No gross deficits, nonverbal    Lab Results   Component Value Date    GLUCOSE 147 (H) 06/03/2015    CALCIUM 8 3 06/05/2022     (L) 06/03/2015    K 3 4 (L) 06/05/2022    CO2 23 06/05/2022     (H) 06/05/2022    BUN 19 06/05/2022    CREATININE 0 81 06/05/2022     Lab Results   Component Value Date    WBC 11 28 (H) 06/05/2022    HGB 7 6 (L) 06/05/2022    HCT 23 7 (L) 06/05/2022    MCV 92 06/05/2022     06/05/2022     Lab Results   Component Value Date    ALT 9 (L) 06/05/2022    AST 24 06/05/2022    ALKPHOS 54 06/05/2022    BILITOT 0 4 06/03/2015     No results found for: AMYLASE  Lab Results   Component Value Date    LIPASE 62 (L) 06/03/2022     Lab Results   Component Value Date    IRON 63 06/04/2022    TIBC 198 (L) 06/04/2022    FERRITIN 64 06/04/2022     Lab Results   Component Value Date    INR 1 06 06/03/2022

## 2022-06-05 NOTE — ASSESSMENT & PLAN NOTE
More lethargic on day prior to admission and then had an episode of unresponsiveness on a m  Of admission at assisted living facility      Now much more responsive  and commenting about the sunshine this morningsunshine in the fortunato this morning

## 2022-06-05 NOTE — ASSESSMENT & PLAN NOTE
Most likely diverticular bleed source-  GI input appreciated--Dr Luz Soriano following  Transfused 2 units of packed red blood cells yesterday with hemoglobin 9 1 and hematocrit 27 6 this a  Continue to trend

## 2022-06-05 NOTE — ASSESSMENT & PLAN NOTE
Patient with fever and altered mental status in setting  also acute cystitis with hematuria   elevated lactic aci level -on presentation  being given 2nd iv bolus by Er staff  Blood pressure stabilized  1/2 blood cultures are positive-repeat blood cultures ordered this morning-possible contaminant

## 2022-06-05 NOTE — ASSESSMENT & PLAN NOTE
Urine culture with greater than 100,000 colonies of Gram-negative rods  One of 2 blood cultures positive for g positives-question whether this is contaminant   Day #3 of empiric antibioics with ceftriaxone

## 2022-06-05 NOTE — QUICK NOTE
Notified of 1/2 blood cultures positive for gram positive cocci  Will repeat blood cultures  Suspect contaminant, so will hold on initiating gram positive coverage for now

## 2022-06-06 ENCOUNTER — APPOINTMENT (INPATIENT)
Dept: CT IMAGING | Facility: HOSPITAL | Age: 87
DRG: 871 | End: 2022-06-06
Payer: MEDICARE

## 2022-06-06 PROBLEM — I63.9 CVA (CEREBRAL VASCULAR ACCIDENT) (HCC): Status: ACTIVE | Noted: 2022-06-06

## 2022-06-06 PROBLEM — R29.90 STROKE-LIKE SYMPTOMS: Status: ACTIVE | Noted: 2022-06-06

## 2022-06-06 LAB
2HR DELTA HS TROPONIN: -1 NG/L
ABO GROUP BLD BPU: NORMAL
ALBUMIN SERPL BCP-MCNC: 2 G/DL (ref 3.5–5)
ALP SERPL-CCNC: 48 U/L (ref 46–116)
ALT SERPL W P-5'-P-CCNC: 10 U/L (ref 12–78)
ANION GAP SERPL CALCULATED.3IONS-SCNC: 7 MMOL/L (ref 4–13)
ANION GAP SERPL CALCULATED.3IONS-SCNC: 7 MMOL/L (ref 4–13)
AST SERPL W P-5'-P-CCNC: 19 U/L (ref 5–45)
ATRIAL RATE: 57 BPM
ATRIAL RATE: 86 BPM
BASE EXCESS BLDA CALC-SCNC: -3 MMOL/L (ref -2–3)
BILIRUB SERPL-MCNC: 0.4 MG/DL (ref 0.2–1)
BPU ID: NORMAL
BUN SERPL-MCNC: 23 MG/DL (ref 5–25)
BUN SERPL-MCNC: 25 MG/DL (ref 5–25)
CA-I BLD-SCNC: 1.29 MMOL/L (ref 1.12–1.32)
CA-I BLD-SCNC: 1.41 MMOL/L (ref 1.12–1.32)
CALCIUM ALBUM COR SERPL-MCNC: 10 MG/DL (ref 8.3–10.1)
CALCIUM SERPL-MCNC: 8.4 MG/DL (ref 8.3–10.1)
CALCIUM SERPL-MCNC: 8.6 MG/DL (ref 8.3–10.1)
CARDIAC TROPONIN I PNL SERPL HS: 27 NG/L
CARDIAC TROPONIN I PNL SERPL HS: 28 NG/L
CHLORIDE SERPL-SCNC: 113 MMOL/L (ref 100–108)
CHLORIDE SERPL-SCNC: 113 MMOL/L (ref 100–108)
CO2 SERPL-SCNC: 23 MMOL/L (ref 21–32)
CO2 SERPL-SCNC: 24 MMOL/L (ref 21–32)
CREAT SERPL-MCNC: 0.8 MG/DL (ref 0.6–1.3)
CREAT SERPL-MCNC: 0.8 MG/DL (ref 0.6–1.3)
CROSSMATCH: NORMAL
ERYTHROCYTE [DISTWIDTH] IN BLOOD BY AUTOMATED COUNT: 16.1 % (ref 11.6–15.1)
ERYTHROCYTE [DISTWIDTH] IN BLOOD BY AUTOMATED COUNT: 17.2 % (ref 11.6–15.1)
FIO2 GAS DIL.REBREATH: 21 L
GFR SERPL CREATININE-BSD FRML MDRD: 60 ML/MIN/1.73SQ M
GFR SERPL CREATININE-BSD FRML MDRD: 60 ML/MIN/1.73SQ M
GLUCOSE SERPL-MCNC: 113 MG/DL (ref 65–140)
GLUCOSE SERPL-MCNC: 114 MG/DL (ref 65–140)
GLUCOSE SERPL-MCNC: 117 MG/DL (ref 65–140)
GLUCOSE SERPL-MCNC: 121 MG/DL (ref 65–140)
HCO3 BLDA-SCNC: 21.2 MMOL/L (ref 22–28)
HCT VFR BLD AUTO: 24.7 % (ref 34.8–46.1)
HCT VFR BLD AUTO: 25.1 % (ref 34.8–46.1)
HCT VFR BLD AUTO: 26.2 % (ref 34.8–46.1)
HCT VFR BLD AUTO: 26.6 % (ref 34.8–46.1)
HCT VFR BLD CALC: 22 % (ref 34.8–46.1)
HGB BLD-MCNC: 8 G/DL (ref 11.5–15.4)
HGB BLD-MCNC: 8.3 G/DL (ref 11.5–15.4)
HGB BLD-MCNC: 8.7 G/DL (ref 11.5–15.4)
HGB BLD-MCNC: 8.7 G/DL (ref 11.5–15.4)
HGB BLDA-MCNC: 7.5 G/DL (ref 11.5–15.4)
INR PPP: 1.15 (ref 0.84–1.19)
LACTATE SERPL-SCNC: 1.1 MMOL/L (ref 0.5–2)
MCH RBC QN AUTO: 29.6 PG (ref 26.8–34.3)
MCH RBC QN AUTO: 30 PG (ref 26.8–34.3)
MCHC RBC AUTO-ENTMCNC: 32.7 G/DL (ref 31.4–37.4)
MCHC RBC AUTO-ENTMCNC: 33.2 G/DL (ref 31.4–37.4)
MCV RBC AUTO: 90 FL (ref 82–98)
MCV RBC AUTO: 91 FL (ref 82–98)
P AXIS: 70 DEGREES
P AXIS: 78 DEGREES
PCO2 BLD: 22 MMOL/L (ref 21–32)
PCO2 BLD: 35.3 MM HG (ref 36–44)
PH BLD: 7.39 [PH] (ref 7.35–7.45)
PLATELET # BLD AUTO: 174 THOUSANDS/UL (ref 149–390)
PLATELET # BLD AUTO: 191 THOUSANDS/UL (ref 149–390)
PMV BLD AUTO: 10.3 FL (ref 8.9–12.7)
PMV BLD AUTO: 10.5 FL (ref 8.9–12.7)
PO2 BLD: 74 MM HG (ref 75–129)
POTASSIUM BLD-SCNC: 3.9 MMOL/L (ref 3.5–5.3)
POTASSIUM SERPL-SCNC: 4 MMOL/L (ref 3.5–5.3)
POTASSIUM SERPL-SCNC: 4.1 MMOL/L (ref 3.5–5.3)
PR INTERVAL: 192 MS
PR INTERVAL: 202 MS
PROT SERPL-MCNC: 4.7 G/DL (ref 6.4–8.2)
PROTHROMBIN TIME: 14.5 SECONDS (ref 11.6–14.5)
QRS AXIS: -4 DEGREES
QRS AXIS: 17 DEGREES
QRSD INTERVAL: 72 MS
QRSD INTERVAL: 82 MS
QT INTERVAL: 386 MS
QT INTERVAL: 442 MS
QTC INTERVAL: 430 MS
QTC INTERVAL: 461 MS
RBC # BLD AUTO: 2.9 MILLION/UL (ref 3.81–5.12)
RBC # BLD AUTO: 2.94 MILLION/UL (ref 3.81–5.12)
SAO2 % BLD FROM PO2: 95 % (ref 60–85)
SODIUM BLD-SCNC: 142 MMOL/L (ref 136–145)
SODIUM SERPL-SCNC: 143 MMOL/L (ref 136–145)
SODIUM SERPL-SCNC: 144 MMOL/L (ref 136–145)
SPECIMEN SOURCE: ABNORMAL
T WAVE AXIS: 89 DEGREES
T WAVE AXIS: 91 DEGREES
UNIT DISPENSE STATUS: NORMAL
UNIT PRODUCT CODE: NORMAL
UNIT PRODUCT VOLUME: 350 ML
UNIT RH: NORMAL
VENTRICULAR RATE: 57 BPM
VENTRICULAR RATE: 86 BPM
WBC # BLD AUTO: 11.47 THOUSAND/UL (ref 4.31–10.16)
WBC # BLD AUTO: 11.86 THOUSAND/UL (ref 4.31–10.16)

## 2022-06-06 PROCEDURE — 99291 CRITICAL CARE FIRST HOUR: CPT | Performed by: PSYCHIATRY & NEUROLOGY

## 2022-06-06 PROCEDURE — 99232 SBSQ HOSP IP/OBS MODERATE 35: CPT | Performed by: INTERNAL MEDICINE

## 2022-06-06 PROCEDURE — 85610 PROTHROMBIN TIME: CPT

## 2022-06-06 PROCEDURE — 70496 CT ANGIOGRAPHY HEAD: CPT

## 2022-06-06 PROCEDURE — 85014 HEMATOCRIT: CPT | Performed by: PHYSICIAN ASSISTANT

## 2022-06-06 PROCEDURE — 85027 COMPLETE CBC AUTOMATED: CPT | Performed by: INTERNAL MEDICINE

## 2022-06-06 PROCEDURE — 99232 SBSQ HOSP IP/OBS MODERATE 35: CPT

## 2022-06-06 PROCEDURE — 82948 REAGENT STRIP/BLOOD GLUCOSE: CPT

## 2022-06-06 PROCEDURE — 93010 ELECTROCARDIOGRAM REPORT: CPT | Performed by: INTERNAL MEDICINE

## 2022-06-06 PROCEDURE — 84295 ASSAY OF SERUM SODIUM: CPT

## 2022-06-06 PROCEDURE — 80048 BASIC METABOLIC PNL TOTAL CA: CPT

## 2022-06-06 PROCEDURE — 84132 ASSAY OF SERUM POTASSIUM: CPT

## 2022-06-06 PROCEDURE — 82947 ASSAY GLUCOSE BLOOD QUANT: CPT

## 2022-06-06 PROCEDURE — 82803 BLOOD GASES ANY COMBINATION: CPT

## 2022-06-06 PROCEDURE — 84484 ASSAY OF TROPONIN QUANT: CPT

## 2022-06-06 PROCEDURE — 85014 HEMATOCRIT: CPT

## 2022-06-06 PROCEDURE — G1004 CDSM NDSC: HCPCS

## 2022-06-06 PROCEDURE — 83605 ASSAY OF LACTIC ACID: CPT

## 2022-06-06 PROCEDURE — 80053 COMPREHEN METABOLIC PANEL: CPT | Performed by: INTERNAL MEDICINE

## 2022-06-06 PROCEDURE — 82330 ASSAY OF CALCIUM: CPT

## 2022-06-06 PROCEDURE — 70498 CT ANGIOGRAPHY NECK: CPT

## 2022-06-06 PROCEDURE — 85027 COMPLETE CBC AUTOMATED: CPT

## 2022-06-06 PROCEDURE — 85018 HEMOGLOBIN: CPT | Performed by: PHYSICIAN ASSISTANT

## 2022-06-06 PROCEDURE — 93005 ELECTROCARDIOGRAM TRACING: CPT

## 2022-06-06 RX ORDER — ATORVASTATIN CALCIUM 40 MG/1
40 TABLET, FILM COATED ORAL
Status: DISCONTINUED | OUTPATIENT
Start: 2022-06-06 | End: 2022-06-07

## 2022-06-06 RX ADMIN — CEFTRIAXONE 1000 MG: 1 INJECTION, SOLUTION INTRAVENOUS at 13:19

## 2022-06-06 RX ADMIN — BRINZOLAMIDE 1 DROP: 10 SUSPENSION/ DROPS OPHTHALMIC at 23:12

## 2022-06-06 RX ADMIN — NYSTATIN: 100000 CREAM TOPICAL at 23:10

## 2022-06-06 RX ADMIN — SODIUM CHLORIDE 3 ML: 9 INJECTION, SOLUTION INTRAMUSCULAR; INTRAVENOUS; SUBCUTANEOUS at 23:11

## 2022-06-06 RX ADMIN — IOHEXOL 60 ML: 350 INJECTION, SOLUTION INTRAVENOUS at 10:05

## 2022-06-06 RX ADMIN — LEVETIRACETAM 3000 MG: 100 INJECTION, SOLUTION INTRAVENOUS at 10:47

## 2022-06-06 NOTE — PLAN OF CARE
Problem: Potential for Falls  Goal: Patient will remain free of falls  Description: INTERVENTIONS:  - Educate patient/family on patient safety including physical limitations  - Instruct patient to call for assistance with activity   - Consult OT/PT to assist with strengthening/mobility   - Keep Call bell within reach  - Keep bed low and locked with side rails adjusted as appropriate  - Keep care items and personal belongings within reach  - Initiate and maintain comfort rounds  - Make Fall Risk Sign visible to staff  - Offer Toileting every 2 Hours, in advance of need  - Initiate/Maintain bed alarm  - Obtain necessary fall risk management equipment: nonskid footwear  - Apply yellow socks and bracelet for high fall risk patients  - Consider moving patient to room near nurses station  Outcome: Progressing     Problem: GASTROINTESTINAL - ADULT  Goal: Minimal or absence of nausea and/or vomiting  Description: INTERVENTIONS:  - Administer IV fluids if ordered to ensure adequate hydration  - Maintain NPO status until nausea and vomiting are resolved  - Nasogastric tube if ordered  - Administer ordered antiemetic medications as needed  - Provide nonpharmacologic comfort measures as appropriate  - Advance diet as tolerated, if ordered  - Consider nutrition services referral to assist patient with adequate nutrition and appropriate food choices  Outcome: Progressing  Goal: Maintains or returns to baseline bowel function  Description: INTERVENTIONS:  - Assess bowel function  - Encourage oral fluids to ensure adequate hydration  - Administer IV fluids if ordered to ensure adequate hydration  - Administer ordered medications as needed  - Encourage mobilization and activity  - Consider nutritional services referral to assist patient with adequate nutrition and appropriate food choices  Outcome: Progressing  Goal: Maintains adequate nutritional intake  Description: INTERVENTIONS:  - Monitor percentage of each meal consumed  - Identify factors contributing to decreased intake, treat as appropriate  - Assist with meals as needed  - Monitor I&O, weight, and lab values if indicated  - Obtain nutrition services referral as needed  Outcome: Progressing  Goal: Establish and maintain optimal ostomy function  Description: INTERVENTIONS:  - Assess bowel function  - Encourage oral fluids to ensure adequate hydration  - Administer IV fluids if ordered to ensure adequate hydration   - Administer ordered medications as needed  - Encourage mobilization and activity  - Nutrition services referral to assist patient with appropriate food choices  - Assess stoma site  - Consider wound care consult   Outcome: Progressing  Goal: Oral mucous membranes remain intact  Description: INTERVENTIONS  - Assess oral mucosa and hygiene practices  - Implement preventative oral hygiene regimen  - Implement oral medicated treatments as ordered  - Initiate Nutrition services referral as needed  Outcome: Progressing     Problem: GENITOURINARY - ADULT  Goal: Maintains or returns to baseline urinary function  Description: INTERVENTIONS:  - Assess urinary function  - Encourage oral fluids to ensure adequate hydration if ordered  - Administer IV fluids as ordered to ensure adequate hydration  - Administer ordered medications as needed  - Offer frequent toileting  - Follow urinary retention protocol if ordered  Outcome: Progressing  Goal: Absence of urinary retention  Description: INTERVENTIONS:  - Assess patients ability to void and empty bladder  - Monitor I/O  - Bladder scan as needed  - Discuss with physician/AP medications to alleviate retention as needed  - Discuss catheterization for long term situations as appropriate  Outcome: Progressing  Goal: Urinary catheter remains patent  Description: INTERVENTIONS:  - Assess patency of urinary catheter  - If patient has a chronic edwards, consider changing catheter if non-functioning  - Follow guidelines for intermittent irrigation of non-functioning urinary catheter  Outcome: Progressing     Problem: METABOLIC, FLUID AND ELECTROLYTES - ADULT  Goal: Electrolytes maintained within normal limits  Description: INTERVENTIONS:  - Monitor labs and assess patient for signs and symptoms of electrolyte imbalances  - Administer electrolyte replacement as ordered  - Monitor response to electrolyte replacements, including repeat lab results as appropriate  - Instruct patient on fluid and nutrition as appropriate  Outcome: Progressing  Goal: Fluid balance maintained  Description: INTERVENTIONS:  - Monitor labs   - Monitor I/O and WT  - Instruct patient on fluid and nutrition as appropriate  - Assess for signs & symptoms of volume excess or deficit  Outcome: Progressing  Goal: Glucose maintained within target range  Description: INTERVENTIONS:  - Monitor Blood Glucose as ordered  - Assess for signs and symptoms of hyperglycemia and hypoglycemia  - Administer ordered medications to maintain glucose within target range  - Assess nutritional intake and initiate nutrition service referral as needed  Outcome: Progressing      Problem: MUSCULOSKELETAL - ADULT  Goal: Maintain or return mobility to safest level of function  Description: INTERVENTIONS:  - Assess patient's ability to carry out ADLs; assess patient's baseline for ADL function and identify physical deficits which impact ability to perform ADLs (bathing, care of mouth/teeth, toileting, grooming, dressing, etc )  - Assess/evaluate cause of self-care deficits   - Assess range of motion  - Assess patient's mobility  - Assess patient's need for assistive devices and provide as appropriate  - Encourage maximum independence but intervene and supervise when necessary  - Involve family in performance of ADLs  - Assess for home care needs following discharge   - Consider OT consult to assist with ADL evaluation and planning for discharge  - Provide patient education as appropriate  Outcome: Progressing  Goal: Maintain proper alignment of affected body part  Description: INTERVENTIONS:  - Support, maintain and protect limb and body alignment  - Provide patient/ family with appropriate education  Outcome: Progressing     Problem: MOBILITY - ADULT  Goal: Maintain or return to baseline ADL function  Description: INTERVENTIONS:  -  Assess patient's ability to carry out ADLs; assess patient's baseline for ADL function and identify physical deficits which impact ability to perform ADLs (bathing, care of mouth/teeth, toileting, grooming, dressing, etc )  - Assess/evaluate cause of self-care deficits   - Assess range of motion  - Assess patient's mobility; develop plan if impaired  - Assess patient's need for assistive devices and provide as appropriate  - Encourage maximum independence but intervene and supervise when necessary  - Involve family in performance of ADLs  - Assess for home care needs following discharge   - Consider OT consult to assist with ADL evaluation and planning for discharge  - Provide patient education as appropriate  Outcome: Progressing  Problem: Prexisting or High Potential for Compromised Skin Integrity  Goal: Skin integrity is maintained or improved  Description: INTERVENTIONS:  - Identify patients at risk for skin breakdown  - Assess and monitor skin integrity  - Assess and monitor nutrition and hydration status  - Monitor labs   - Assess for incontinence   - Turn and reposition patient  - Assist with mobility/ambulation  - Relieve pressure over bony prominences  - Avoid friction and shearing  - Provide appropriate hygiene as needed including keeping skin clean and dry  - Evaluate need for skin moisturizer/barrier cream  - Collaborate with interdisciplinary team   - Patient/family teaching  - Consider wound care consult   Outcome: Progressing     Problem: HEMATOLOGIC - ADULT  Goal: Maintains hematologic stability  Description: INTERVENTIONS  - Assess for signs and symptoms of bleeding or hemorrhage  - Monitor labs  - Administer supportive blood products/factors as ordered and appropriate  Outcome: Progressing

## 2022-06-06 NOTE — CONSULTS
Consultation - Stroke   Gonzalesvince Ava Daley 80 y o  female MRN: [id number]  Unit/Bed#: -01 Encounter: [id number]      Assessment/Plan   CVA (cerebral vascular accident) [facility])  Assessment & Plan  8 year old female with history of atrial fibrillation not on anticoagulation prior to admission, presenting initially on 06/03 from assisted living facility with GI bleeding concern and mental status changes (she had episode of unresponsiveness at the nursing facility prior to admission)    Has been undergoing workup for possible diverticular bleeding, GI bleeding, as well as UTI management  Stroke alert today 6/6 for acute altered mental status; exam with some right gaze deviation, right upper extremity tremor and left-sided weakness with stimulation/limited motor testing      Imaging reveals new right BRITTNEY infarct with distal right BRITTNEY territory occlusion; likely cardioembolic in the setting of atrial fibrillation and lack of AC/antiplatelet due to GI bleed concern    Plan:  -initiate stroke workup:  -CT head during alert with right BRITTNEY territory infarct; no acute hemorrhage  -CTA head/neck with right A3 segment occlusion, no other critical stenosis nor large vessel occlusion elsewhere  -obtain MRI brain  -consider 2D echo, although unlikely to change stroke management as mentioned above  -currently antiplatelet and anticoagulation is on hold due to GI bleed concern:  Discussed with family today, would be hesitant to initiate full anticoagulation for cardioembolic stroke protection given patient's age, bleed risk and ongoing GI bleed concern    -would recommend, if stable from GI/bleeding standpoint, single antiplatelet therapy with Plavix 75 mg daily if family is agreeable  -continue statin  -hemoglobin A1c and lipid panel pending  -given the unresponsive event and possible concern for seizure, given Keppra 3 G load, will hold on maintenance dosing of Keppra and assess clinically  -routine EEG pending  -neuro checks  -telemetry monitoring  -provide stroke education to patient/family  -therapy evaluations if/when clinically appropriate  -consider future/forthcoming goals of care discussion based on clinical course  -ongoing medical workup via primary team, GI:   -CBC/blood count monitoring   -holding on endoscopic evaluation at this time given age and other co-morbidities (unless recurrence of bleeding/clinical worsening)    TPA Decision: Patient not a TPA candidate  Unclear time of onset outside appropriate time window  Discussed plan of care with attending neurologist during stroke alert  Recommendations for outpatient neurological follow up have yet to be determined  History of Present Illness     Reason for Consult / Principal Problem:  Stroke alert, altered mental status  Hx and PE limited by:  Patient encephalopathic  Patient last known well:  Last night, 6/5  Stroke alert called:  9:25 AM  Neurology time of arrival: Immediate  HPI: Violette Morse is a 80 y o   female with history as mentioned above in assessment who neurology is asked to evaluate as stroke alert this morning for altered mental status/unresponsive state  As mentioned above patient admitted several days ago from nursing facility due to mental status changes, episode of unresponsiveness at her nursing facility as well as GI bleeding  She has been worked up past several days by primary team, GI for possible diverticular bleed, also with UTI management  Per discussion with family she is typically conversant to a degree  Patient was found this morning by nursing staff to be poorly responsive and altered, given this good response and stroke alert were activated  Please see NIHSS below as well as neuro imaging results as mentioned above  Given last known normal was last night patient deemed not a tPA candidate, has BRITTNEY occlusion but not amenable to endovascular intervention/thrombectomy      Inpatient consult to Neurology  Consult performed by: Aye Mariscal PA-C  Consult ordered by: TRENT Gamez          Review of Systems   Unable to perform ROS: Mental status change       Historical Information   Past Medical History:   Diagnosis Date    A-fib [facility])     Gastrointestinal hemorrhage associated with intestinal diverticulitis     Osteoarthritis     UTI (urinary tract infection)      History reviewed  No pertinent surgical history  Social History   Social History     Substance and Sexual Activity   Alcohol Use Never     Social History     Substance and Sexual Activity   Drug Use No     E-Cigarette/Vaping    E-Cigarette Use Never User      E-Cigarette/Vaping Substances    Nicotine No     THC No     CBD No     Flavoring No     Other No     Unknown No      Social History     Tobacco Use   Smoking Status Former Smoker   Smokeless Tobacco Never Used     Family History:   Family History   Problem Relation Age of Onset    No Known Problems Mother     No Known Problems Father        Review of previous medical records was completed      Meds/Allergies   current meds:   Current Facility-Administered Medications   Medication Dose Route Frequency    albuterol (PROVENTIL HFA,VENTOLIN HFA) inhaler 2 puff  2 puff Inhalation Q4H PRN    aluminum-magnesium hydroxide-simethicone (MYLANTA) oral suspension 30 mL  30 mL Oral Q6H PRN    atorvastatin (LIPITOR) tablet 40 mg  40 mg Oral Daily With Dinner    brinzolamide (AZOPT) 1 % ophthalmic suspension 1 drop  1 drop Both Eyes HS    cefTRIAXone (ROCEPHIN) IVPB (premix in dextrose) 1,000 mg 50 mL  1,000 mg Intravenous Daily    docusate sodium (COLACE) capsule 100 mg  100 mg Oral BID    nystatin (MYCOSTATIN) cream   Topical BID    ondansetron (ZOFRAN) injection 4 mg  4 mg Intravenous Q6H PRN    pantoprazole (PROTONIX) EC tablet 40 mg  40 mg Oral Early Morning    PARoxetine (PAXIL) tablet 20 mg  20 mg Oral QAM    sodium chloride (PF) 0 9 % injection 3 mL  3 mL Intravenous Q8H [address]    and PTA meds:   Prior to Admission Medications   Prescriptions Last Dose Informant Patient Reported? Taking? PARoxetine (PAXIL) 30 mg tablet 6/2/2022 at Unknown time  No Yes   Sig: TAKE 1 TABLET EVERY MORNING   albuterol (PROAIR HFA) 90 mcg/act inhaler 6/2/2022 at Unknown time  No Yes   Sig: Inhale 2 puffs every 4 (four) hours as needed for wheezing or shortness of breath   brinzolamide (AZOPT) 1 % ophthalmic suspension 6/2/2022 at Unknown time Self Yes Yes   Sig: Apply to eye daily at bedtime    losartan (COZAAR) 100 MG tablet 6/2/2022 at Unknown time  No Yes   Sig: TAKE 1 TABLET DAILY   metoprolol succinate (TOPROL-XL) 100 mg 24 hr tablet 6/2/2022 at Unknown time  No Yes   Sig: Take 1 tablet (100 mg total) by mouth daily   nystatin (MYCOSTATIN) cream 6/2/2022 at Unknown time Self Yes Yes   Sig: Apply topically 2 (two) times a day   omeprazole (PriLOSEC) 20 mg delayed release capsule 6/2/2022 at Unknown time  No Yes   Sig: TAKE 1 CAPSULE DAILY      Facility-Administered Medications: None       Allergies   Allergen Reactions    Sulfa Antibiotics Rash     per t sys    Sulfamethoxazole-Trimethoprim Rash       Objective   Vitals:Blood pressure 142/53, pulse 55, temperature 97 9 °F (36 6 °C), resp  rate 20, height 5' 4" (1 626 m), weight 58 8 kg (129 lb 10 1 oz), SpO2 96 %, not currently breastfeeding  ,Body mass index is 22 25 kg/m²  Intake/Output Summary (Last 24 hours) at 6/6/2022 1449  Last data filed at 6/6/2022 0151  Gross per 24 hour   Intake 350 ml   Output 225 ml   Net 125 ml       Invasive Devices: Invasive Devices  Report    Peripheral Intravenous Line  Duration           Peripheral IV 06/05/22 Right;Ventral (anterior) Forearm 1 day          Drain  Duration           Urethral Catheter Temperature probe 3 days              Examined alongside Dr Jens Rodrigues      Physical Exam  Constitutional:       Comments: Encephalopathic appearing, poor responsiveness   HENT:      Head: Normocephalic and atraumatic  Eyes:      Extraocular Movements: Extraocular movements intact  Conjunctiva/sclera: Conjunctivae normal    Cardiovascular:      Rate and Rhythm: Normal rate and regular rhythm  Pulmonary:      Effort: Pulmonary effort is normal    Abdominal:      General: There is no distension  Musculoskeletal:      Cervical back: Normal range of motion and neck supple  Skin:     General: Skin is warm and dry  Neurologic Exam     Mental Status   Patient encephalopathic, no real question answering nor command following  Will open eyes with sustain stimuli  No consistent tracking with repeated verbal stimulation to either side of bed         Cranial Nerves     Initially with right gaze preference, somewhat midline wall and CT scanner on repeat assessments  Visual fields difficult to formally assess, no obvious facial asymmetry/droop     Motor Exam Overall extremities fairly quickly dropped to bed with passive lift and release:  Very limited motor exam with encephalopathy, has less vigorous motor response/strength with the left upper and lower extremity compared to right during noxious stim application as well as passive lift and release by examiners     Gait, Coordination, and Reflexes   No clinical seizure activity seen throughout exam     Has right upper extremity tremoring during exam, not clearly rhythmic, able to be somewhat suppressed by examiner holding right hand  Gait cannot be safely assess, cannot perform coordination testing with encephalopathy  NIHSS:  1a Level of Consciousness: 1 = Not alert, but arousable with minimal stimulation   1b  LOC Questions: 2 = Answers neither correctly   1c  LOC Commands: 2 = Obeys neither correctly   2  Best Gaze: 1 = Partial Gaze Palsy   3  Visual: 0 = No visual field loss   4  Facial Palsy: 0=Normal symmetric movement   5a  Motor Right Arm: 3=No effort against gravity, limb falls   5b   Motor Left Arm: 3=No effort against gravity, limb falls   6a  Motor Right Le=Some effort against gravity, limb cannot get to or maintain (if cured) 90 (or 45) degrees, drifts down to bed, but has some effort against gravity   6b  Motor Left Leg: 3=No effort against gravity, limb falls   7  Limb Ataxia:  0=Absent   8  Sensory: 0=Normal; no sensory loss   9  Best Language:  3=Mute, global aphasia; no usable speech or auditory comprehension   10  Dysarthria: 2=Severe; patient speech is so slurred as to be unintelligible in the absence of or our of proportion to any dysphagia, or is mute/anarthric   11   Extinction and Inattention (formerly Neglect): 0=No abnormality   Total Score: 22     Time NIHSS was completed: Approximately 9:30 AM    Modified Malika Score:  4 (Moderately severe disability; unable to walk and attend to bodily needs without assistance)    Lab Results:   CBC:   Results from last 7 days   Lab Units 22  0935 22  0929 22  0924 22  0336 22  1152 22  0447   WBC Thousand/uL 11 86*  --   --  11 47*  --  11 28*   RBC Million/uL 2 90*  --   --  2 94*  --  2 99*   HEMOGLOBIN g/dL 8 7*  --  8 3* 8 7*   < > 9 1*   I STAT HEMOGLOBIN g/dl  --  7 5*  --   --   --   --    HEMATOCRIT % 26 2*  --  25 1* 26 6*   < > 27 6*   HEMATOCRIT, ISTAT %  --  22*  --   --   --   --    MCV fL 90  --   --  91  --  92   PLATELETS Thousands/uL 191  --   --  174  --  202    < > = values in this interval not displayed    , BMP/CMP:   Results from last 7 days   Lab Units 22  0935 22  0929 22  0336 22  0447 22  0254   SODIUM mmol/L 143  --  144 146* 142   POTASSIUM mmol/L 4 1  --  4 0 3 4* 4 1   CHLORIDE mmol/L 113*  --  113* 115* 110*   CO2 mmol/L 23  --  24 23 23   CO2, I-STAT mmol/L  --    --   --   --    BUN mg/dL 23  --  25 19 20   CREATININE mg/dL 0 80  --  0 80 0 81 0 77   GLUCOSE, ISTAT mg/dl  --  121  --   --   --    CALCIUM mg/dL 8 6  --  8 4 8 3 8 1*   AST U/L  --   --  19 24 16   ALT U/L  --   --  10* 9* 10*   ALK PHOS U/L  --   --  48 54 53   EGFR ml/min/1 73sq m 60  --  60 59 63   , Vitamin B12:   , HgBA1C:   , TSH:   Results from last 7 days   Lab Units 06/03/22  1311   TSH 3RD GENERATON uIU/mL 3 643   , Coagulation:   Results from last 7 days   Lab Units 06/06/22  0935   INR  1 15   , Lipid Profile:   , Ammonia:   , Urinalysis:   Results from last 7 days   Lab Units 06/03/22  1316   COLOR UA  Yellow   CLARITY UA  Cloudy   SPEC GRAV UA  >=1 030   PH UA  5 5   LEUKOCYTES UA  Small*   NITRITE UA  Positive*   GLUCOSE UA mg/dl Negative   KETONES UA mg/dl Trace*   BILIRUBIN UA  Negative   BLOOD UA  Large*   , Drug Screen:   , Medication Drug Levels:       Invalid input(s): CARBAMAZEPINE,  PHENOBARB, LACOSAMIDE, OXCARBAZEPINE  Imaging Studies: I have personally reviewed pertinent films in PACS   CTA stroke alert (head/neck)   Final Result by Autumn Ruffin MD (06/06 1045)      Right A3 occlusion  No other large vessel occlusion   Multifocal intracranial stenosis  No significant stenosis of the cervical carotid or vertebral arteries  I personally communicated results of this study to [facility] on 10:28 AM                            Workstation performed: [id number]         CT stroke alert brain   Final Result by Autumn Ruffin MD (06/06 1045)      New right BRITTNEY distribution infarct in the paramedian right posterior frontal/parietal lobes  No acute hemorrhage or mass effect  I personally communicated results of this study to [facility] on 10:28 AM       Workstation performed: [id number]         CT head without contrast   Final Result by Amilcar Snyder MD (06/03 0330)      No acute intracranial abnormality  Unchanged microangiopathic changes  Workstation performed: [id number]         CT chest abdomen pelvis wo contrast   Final Result by Amilcar Snyder MD (06/03 9196)         1  Technically limited study due to motion     2   No acute abnormality identified in the chest, abdomen, or pelvis  3   Gallstones without evidence for cholecystitis or biliary obstruction  4   Additional findings as noted  Workstation performed: [id number]             EKG, Pathology, and Other Studies: I have personally reviewed pertinent reports  VTE Prophylaxis: Sequential compression device (Venodyne)  and Reason for no pharmacologic prophylaxis GI bleed concern/workup    Code Status: Level 3 - DNAR and DNI    Please see attending's attestation for critical care time spent/billing  Discussed plan of care with patient's family as well as primary team:  Concern for right BRITTNEY stroke, likely cardioembolic in the setting of AFib and no anticoagulation/antiplatelet given GI bleed workup    Will perform MRI brain, EEG, consider single antiplatelet when okay from GI standpoint for some secondary stroke prevention, neuro checks, ongoing medical workup

## 2022-06-06 NOTE — RAPID RESPONSE
Rapid Response Note  Saadia Daley 80 y o  female MRN: [id number]  Unit/Bed#: -01 Encounter: [id number]    Rapid Response Notification(s):   Response called date/time:  6/6/2022 9:11 AM  Response team arrival date/time:  6/6/2022 9:12 AM  Response end date/time:  6/6/2022 9:30 AM  Level of care:  [facility]  Rapid response location:  [facility] unit  Primary reason for rapid response call:  Acute change in neuro status    Rapid Response Intervention(s):   Airway:  None  Breathing:  None  Circulation:  None  Fluids administered:  None  Medications administered:  None       Assessment:   · Acute Change of Mental Status   · History of unresponsive episodes  · Dementia   · UTI on ABX     Plan:   · Stroke Alert Called - Neuro at bedside   · CTA Head and Neck pending   · EKG - SB   · ISTAT completed - pH 7 386/35 3/34  · STAT CBC, BMP, LA, Trop pending   · Attempting to reach POA   · Remain on SLIM service   · Neurology following with CT scan results - poor candidate for tPa given GI bleed   · Neurology discussing if patient would be candidate for IR intervention pending scans   · Continue abx for UTI   · Neuro checks q 2  · Place on Telemetry - will remain on SLIM service     Rapid Response Outcome:   Transfer:  Remain on floor  Primary service notified of transfer: Yes    Code Status: Level 3 (DNAR and DNI)    Comments:  Spoke with Dr Braden Comer  Remaining at [facility] level of care  Will follow with scans  Dr Braden Comer is trying to reach patient's POA  Family notified of transfer: no  Family member contacted: Dr Braden Comer attempting to reach family  Will remain under current service  Background/Situation:   Saadia Daley is a 80 y o  female who presented to the hospital with multiple unresponsive episodes and dementia  Per nursing the bedside unresponsive episodes were short in nature and self-resolving    Upon evaluation at the bedside patient is only responsive to noxious stimuli to which she withdrawals  She occasionally opens her eyes  Patient did not follow any commands noted to have a right-sided facial droop and right hand tremor  Last known normal at bed check for night shift  Patient is here for an acute GI bleed  Hemoglobin was 7 5 on the i-STAT  Glucose 117 on a fingerstick  Patient was not on telemetry prior but when placed on monitor was sinus bradycardic  Blood pressure systolic 692K to 799V during rapid response  O2 sat 96% on room air  Stroke alert was called and Neurology evaluated patient at bedside  Decision to send patient for CTA head and neck  Dr Viola Ramon with primary team is reaching out the patient's family for clinical update  Patient will remain on SLIM service and follow with Neurology's recommendations once scans are complete  Review of Systems   Unable to perform ROS: Mental status change       Objective:   Vitals:    06/06/22 0150 06/06/22 0600 06/06/22 0721 06/06/22 0928   BP: 161/63  147/59 142/53   BP Location: Left arm      Pulse: 56  62 55   Resp: 20      Temp: 97 8 °F (36 6 °C)  97 9 °F (36 6 °C)    TempSrc: Tympanic      SpO2: 99%  96% 96%   Weight:  58 8 kg (129 lb 10 1 oz)     Height:         Physical Exam  Eyes:      Pupils: Pupils are equal, round, and reactive to light  Comments: Sluggish    Cardiovascular:      Rate and Rhythm: Bradycardia present  Pulses: Normal pulses  Heart sounds: No murmur heard  Pulmonary:      Effort: Pulmonary effort is normal  No respiratory distress  Breath sounds: Normal breath sounds  Abdominal:      General: There is no distension  Palpations: Abdomen is soft  Tenderness: There is no abdominal tenderness  Skin:     General: Skin is warm  Coloration: Skin is pale  Neurological:      Mental Status: She is unresponsive  GCS: GCS eye subscore is 2  GCS verbal subscore is 1  GCS motor subscore is 4  Cranial Nerves: Facial asymmetry (Right sided droop) present  Sensory: Sensory deficit (No response to painful stimuli right sided ) present  Motor: Weakness and tremor (Right hand ) present  Comments: Left sided weakness greater than right          Portions of the record may have been created with voice recognition software  Occasional wrong word or "sound a like" substitutions may have occurred due to the inherent limitations of voice recognition software  Read the chart carefully and recognize, using context, where substitutions have occurred      [place] TRENT Ji

## 2022-06-06 NOTE — PLAN OF CARE
Problem: Potential for Falls  Goal: Patient will remain free of falls  Description: INTERVENTIONS:  - Educate patient/family on patient safety including physical limitations  - Instruct patient to call for assistance with activity   - Consult OT/PT to assist with strengthening/mobility   - Keep Call bell within reach  - Keep bed low and locked with side rails adjusted as appropriate  - Keep care items and personal belongings within reach  - Initiate and maintain comfort rounds  - Make Fall Risk Sign visible to staff  - Offer Toileting every 2 Hours, in advance of need  - Initiate/Maintain bed alarm  - Obtain necessary fall risk management equipment:   - Apply yellow socks and bracelet for high fall risk patients  - Consider moving patient to room near nurses station  Outcome: Progressing     Problem: GASTROINTESTINAL - ADULT  Goal: Minimal or absence of nausea and/or vomiting  Description: INTERVENTIONS:  - Administer IV fluids if ordered to ensure adequate hydration  - Maintain NPO status until nausea and vomiting are resolved  - Nasogastric tube if ordered  - Administer ordered antiemetic medications as needed  - Provide nonpharmacologic comfort measures as appropriate  - Advance diet as tolerated, if ordered  - Consider nutrition services referral to assist patient with adequate nutrition and appropriate food choices  Outcome: Progressing  Goal: Maintains or returns to baseline bowel function  Description: INTERVENTIONS:  - Assess bowel function  - Encourage oral fluids to ensure adequate hydration  - Administer IV fluids if ordered to ensure adequate hydration  - Administer ordered medications as needed  - Encourage mobilization and activity  - Consider nutritional services referral to assist patient with adequate nutrition and appropriate food choices  Outcome: Progressing  Goal: Maintains adequate nutritional intake  Description: INTERVENTIONS:  - Monitor percentage of each meal consumed  - Identify factors contributing to decreased intake, treat as appropriate  - Assist with meals as needed  - Monitor I&O, weight, and lab values if indicated  - Obtain nutrition services referral as needed  Outcome: Progressing  Goal: Establish and maintain optimal ostomy function  Description: INTERVENTIONS:  - Assess bowel function  - Encourage oral fluids to ensure adequate hydration  - Administer IV fluids if ordered to ensure adequate hydration   - Administer ordered medications as needed  - Encourage mobilization and activity  - Nutrition services referral to assist patient with appropriate food choices  - Assess stoma site  - Consider wound care consult   Outcome: Progressing  Goal: Oral mucous membranes remain intact  Description: INTERVENTIONS  - Assess oral mucosa and hygiene practices  - Implement preventative oral hygiene regimen  - Implement oral medicated treatments as ordered  - Initiate Nutrition services referral as needed  Outcome: Progressing     Problem: GENITOURINARY - ADULT  Goal: Maintains or returns to baseline urinary function  Description: INTERVENTIONS:  - Assess urinary function  - Encourage oral fluids to ensure adequate hydration if ordered  - Administer IV fluids as ordered to ensure adequate hydration  - Administer ordered medications as needed  - Offer frequent toileting  - Follow urinary retention protocol if ordered  Outcome: Progressing  Goal: Absence of urinary retention  Description: INTERVENTIONS:  - Assess patients ability to void and empty bladder  - Monitor I/O  - Bladder scan as needed  - Discuss with physician/AP medications to alleviate retention as needed  - Discuss catheterization for long term situations as appropriate  Outcome: Progressing  Goal: Urinary catheter remains patent  Description: INTERVENTIONS:  - Assess patency of urinary catheter  - If patient has a chronic edwards, consider changing catheter if non-functioning  - Follow guidelines for intermittent irrigation of non-functioning urinary catheter  Outcome: Progressing     Problem: METABOLIC, FLUID AND ELECTROLYTES - ADULT  Goal: Electrolytes maintained within normal limits  Description: INTERVENTIONS:  - Monitor labs and assess patient for signs and symptoms of electrolyte imbalances  - Administer electrolyte replacement as ordered  - Monitor response to electrolyte replacements, including repeat lab results as appropriate  - Instruct patient on fluid and nutrition as appropriate  Outcome: Progressing  Goal: Fluid balance maintained  Description: INTERVENTIONS:  - Monitor labs   - Monitor I/O and WT  - Instruct patient on fluid and nutrition as appropriate  - Assess for signs & symptoms of volume excess or deficit  Outcome: Progressing  Goal: Glucose maintained within target range  Description: INTERVENTIONS:  - Monitor Blood Glucose as ordered  - Assess for signs and symptoms of hyperglycemia and hypoglycemia  - Administer ordered medications to maintain glucose within target range  - Assess nutritional intake and initiate nutrition service referral as needed  Outcome: Progressing     Problem: SKIN/TISSUE INTEGRITY - ADULT  Goal: Skin Integrity remains intact(Skin Breakdown Prevention)  Description: Assess:  -Perform Miguel assessment every   -Clean and moisturize skin every   -Inspect skin when repositioning, toileting, and assisting with ADLS  -Assess under medical devices such as  every   -Assess extremities for adequate circulation and sensation     Bed Management:  -Have minimal linens on bed & keep smooth, unwrinkled  -Change linens as needed when moist or perspiring  -Avoid sitting or lying in one position for more than  hours while in bed  -Keep HOB at degrees     Toileting:  -Offer bedside commode  -Assess for incontinence every   -Use incontinent care products after each incontinent episode such as     Activity:  -Mobilize patient  times a day  -Encourage activity and walks on unit  -Encourage or provide ROM exercises   -Turn and reposition patient every  Hours  -Use appropriate equipment to lift or move patient in bed  -Instruct/ Assist with weight shifting every  when out of bed in chair  -Consider limitation of chair time  hour intervals    Skin Care:  -Avoid use of baby powder, tape, friction and shearing, hot water or constrictive clothing  -Relieve pressure over bony prominences using   -Do not massage red bony areas    Next Steps:  -Teach patient strategies to minimize risks such as    -Consider consults to  interdisciplinary teams such as   Outcome: Progressing  Goal: Incision(s), wounds(s) or drain site(s) healing without S/S of infection  Description: INTERVENTIONS  - Assess and document dressing, incision, wound bed, drain sites and surrounding tissue  - Provide patient and family education  - Perform skin care/dressing changes every   Outcome: Progressing  Goal: Pressure injury heals and does not worsen  Description: Interventions:  - Implement low air loss mattress or specialty surface (Criteria met)  - Apply silicone foam dressing  - Instruct/assist with weight shifting every  minutes when in chair   - Limit chair time to  hour intervals  - Use special pressure reducing interventions such as  when in chair   - Apply fecal or urinary incontinence containment device   - Perform passive or active ROM every   - Turn and reposition patient & offload bony prominences every  hours   - Utilize friction reducing device or surface for transfers   - Consider consults to  interdisciplinary teams such as   - Use incontinent care products after each incontinent episode such as   - Consider nutrition services referral as needed  Outcome: Progressing     Problem: MUSCULOSKELETAL - ADULT  Goal: Maintain or return mobility to safest level of function  Description: INTERVENTIONS:  - Assess patient's ability to carry out ADLs; assess patient's baseline for ADL function and identify physical deficits which impact ability to perform ADLs (bathing, care of mouth/teeth, toileting, grooming, dressing, etc )  - Assess/evaluate cause of self-care deficits   - Assess range of motion  - Assess patient's mobility  - Assess patient's need for assistive devices and provide as appropriate  - Encourage maximum independence but intervene and supervise when necessary  - Involve family in performance of ADLs  - Assess for home care needs following discharge   - Consider OT consult to assist with ADL evaluation and planning for discharge  - Provide patient education as appropriate  Outcome: Progressing  Goal: Maintain proper alignment of affected body part  Description: INTERVENTIONS:  - Support, maintain and protect limb and body alignment  - Provide patient/ family with appropriate education  Outcome: Progressing     Problem: MOBILITY - ADULT  Goal: Maintain or return to baseline ADL function  Description: INTERVENTIONS:  -  Assess patient's ability to carry out ADLs; assess patient's baseline for ADL function and identify physical deficits which impact ability to perform ADLs (bathing, care of mouth/teeth, toileting, grooming, dressing, etc )  - Assess/evaluate cause of self-care deficits   - Assess range of motion  - Assess patient's mobility; develop plan if impaired  - Assess patient's need for assistive devices and provide as appropriate  - Encourage maximum independence but intervene and supervise when necessary  - Involve family in performance of ADLs  - Assess for home care needs following discharge   - Consider OT consult to assist with ADL evaluation and planning for discharge  - Provide patient education as appropriate  Outcome: Progressing  Goal: Maintains/Returns to pre admission functional level  Description: INTERVENTIONS:  - Perform BMAT or MOVE assessment daily    - Set and communicate daily mobility goal to care team and patient/family/caregiver     - Collaborate with rehabilitation services on mobility goals if consulted  - Perform Range of Motion 3 times a day  - Reposition patient every 2 hours    - Dangle patient 3 times a day  - Stand patient 3 times a day  - Ambulate patient 3 times a day  - Out of bed to chair 3 times a day   - Out of bed for meals 3 times a day  - Out of bed for toileting  - Record patient progress and toleration of activity level   Outcome: Progressing     Problem: Prexisting or High Potential for Compromised Skin Integrity  Goal: Skin integrity is maintained or improved  Description: INTERVENTIONS:  - Identify patients at risk for skin breakdown  - Assess and monitor skin integrity  - Assess and monitor nutrition and hydration status  - Monitor labs   - Assess for incontinence   - Turn and reposition patient  - Assist with mobility/ambulation  - Relieve pressure over bony prominences  - Avoid friction and shearing  - Provide appropriate hygiene as needed including keeping skin clean and dry  - Evaluate need for skin moisturizer/barrier cream  - Collaborate with interdisciplinary team   - Patient/family teaching  - Consider wound care consult   Outcome: Progressing     Problem: HEMATOLOGIC - ADULT  Goal: Maintains hematologic stability  Description: INTERVENTIONS  - Assess for signs and symptoms of bleeding or hemorrhage  - Monitor labs  - Administer supportive blood products/factors as ordered and appropriate  Outcome: Progressing

## 2022-06-06 NOTE — ASSESSMENT & PLAN NOTE
Gi consult appreciated  Status post packed red cell transfusion  Hemoglobin this morning is 8 7  On Protonix  Hope to avoid endoscopy if possible

## 2022-06-06 NOTE — ASSESSMENT & PLAN NOTE
Urine culture with greater than 100,000 colonies of E coli  Continue Rocephin  Trend WBC and fever curve Depressed

## 2022-06-06 NOTE — CASE MANAGEMENT
Case Management Progress Note    Patient name Yahaira Sánchez  Location /-91 MRN [id number]  : 6/10/1921 Date 2022       LOS (days): 3  Geometric Mean LOS (GMLOS) (days):   Days to [address]:        OBJECTIVE:        Current admission status: Inpatient  Preferred Pharmacy:   [address] [facility] [address]  Phone: [phone] Fax: [phone], [address] [place] [address]  Phone: [phone] Fax: [phone]    Primary Care Provider: Breana Jerez MD    Primary Insurance: MEDICARE  Secondary Insurance:     PROGRESS NOTE: CM attempt to reach pts dtr regarding assessment questions  Cm left VM and will attempt to reach pts dtr again

## 2022-06-06 NOTE — ASSESSMENT & PLAN NOTE
Most likely diverticular bleed source-  GI input appreciated--Dr Derik Peralta following  Status post packed red cell transfusion  Hemoglobin this morning is 8 7  On Protonix  Continue to trend

## 2022-06-06 NOTE — ASSESSMENT & PLAN NOTE
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

## 2022-06-06 NOTE — CASE MANAGEMENT
Case Management Assessment & Discharge Planning Note    Patient name Luz Marina Oseguera  Location /-48 MRN [id number]  : 6/10/1921 Date 2022       Current Admission Date: 6/3/2022  Current Admission Diagnosis:Sepsis [facility])   Patient Active Problem List    Diagnosis Date Noted    Acute blood loss anemia 2022    Cystitis 2022    Acute diverticulitis 2022    Rectal bleeding 2022    Pneumonia 2019    Sepsis ([address] ) 2019    Hyponatremia 2019    Hypercalcemia 2019    Encephalopathy, metabolic     Unsteady gait 2019    Essential hypertension 2018    Mild intermittent asthma without complication     Minor depression 2018    Gastroesophageal reflux disease without esophagitis 2018      LOS (days): 3  Geometric Mean LOS (GMLOS) (days):   Days to GMLOS:     OBJECTIVE:    Risk of Unplanned Readmission Score: 10 92         Current admission status: Inpatient  Referral Reason: VNA, Acute Rehab    Preferred Pharmacy:   [address]  [address] [facility] [address]  Phone: [phone] Fax: [address] [place] [address]  Phone: [phone] Fax: [phone]    Primary Care Provider: Pedro Pablo Ceballos MD    Primary Insurance: MEDICARE  Secondary Insurance:     ASSESSMENT:  Active Health Care Proxies     Edi, [address] - Daughter   Primary Phone: [phone] (Home)               Advance Directives  Does patient have a [address]?: Yes  Does patient have Advance Directives?: Yes  Advance Directives: Living will, Power of  for health care  Primary Contact:  Tenisha Rodríguez (no documents on file, may be in paperwork that came from [facility] with pt )         Readmission Root Cause  30 Day Readmission: No    Patient Information  Admitted from[de-identified] Facility  Mental Status: Confused  During Assessment patient was accompanied by: Not accompanied during assessment  Assessment information provided by[de-identified] Daughter  Primary Caregiver: Self  Support Systems: Self, Daughter, Organized support group (Comment), Family members  [place] of Residence: [address] [facility] do you live in?: [address] [place] [address] entry access options   Select all that apply : No steps to enter home  Type of Current Residence: Facility  Upon entering residence, is there a bedroom on the main floor (no further steps)?: Yes  Upon entering residence, is there a bathroom on the main floor (no further steps)?: Yes  In the last 12 months, was there a time when you did not have a steady place to sleep or slept in a shelter (including now)?: No  Homeless/housing insecurity resource given?: N/A  Living Arrangements: Other (Comment) (Facility Resident)  Is patient a ?: No    Activities of Daily Living Prior to Admission  Functional Status: Independent  Completes ADLs independently?: No  Level of ADL dependence: Assistance  Ambulates independently?: No  Level of ambulatory dependence: Assistance  Does patient currently own DME?: Yes  What DME does the patient currently own?: [employer] Wheelchair  Does patient have a history of Outpatient Therapy (PT/OT)?: No  Does the patient have a history of Short-Term Rehab?: No  Does patient have a history of HHC?: No  Does patient currently have [address] ?: No         Patient Information Continued  Income Source: Pension/[employer]  Does patient have prescription coverage?: Yes  Within the past 12 months, you worried that your food would run out before you got the money to buy more : Never true  Within the past 12 months, the food you bought just didn't last and you didn't have money to get more : Never true  Food insecurity resource given?: N/A  Does patient receive dialysis treatments?: No  Does patient have a history of substance abuse?: No  Does patient have a history of Mental Health Diagnosis?: No         Means of Transportation  Means of Transport to Appts[de-identified] None  In the past 12 months, has lack of transportation kept you from medical appointments or from getting medications?: No  In the past 12 months, has lack of transportation kept you from meetings, work, or from getting things needed for daily living?: No  Was application for public transport provided?: N/A        DISCHARGE DETAILS:    Discharge planning discussed with[de-identified] Pts Dtr Carla Cruzus of Choice: Yes  Comments - Freedom of Choice: discussed  CM contacted family/caregiver?: Yes  Were Treatment Team discharge recommendations reviewed with patient/caregiver?: Yes  Did patient/caregiver verbalize understanding of patient care needs?: Yes       Contacts  Patient Contacts: Cyndy Daley  Relationship to Patient[de-identified] Family  Contact Method: Phone  Phone Number: [phone]  Reason/Outcome: Discharge [address]         Is the patient interested in [address] at discharge?: No    DME Referral Provided  Referral made for DME?: No    Other Referral/Resources/Interventions Provided:  Interventions: Facility Return  Referral Comments: Pt from [address] [place] [address]            Discharge Destination Plan[de-identified] Facility Return  Transport at Discharge : BLS Ambulance  Dispatcher Contacted: No                          IMM Given (Date):: 06/06/22  IMM Given to[de-identified] Family  Family notified[de-identified] Cyndy Daley  Additional Comments: Cm spoke with pts dtr Cyndy Davide via phone  She reports that pt is a longterm resident of [address] [place] [address]  She has lived there for a few years now per dtr  Pt uses a w/c mostly but the facility staff do walk her with a walker ocassionally  Pt has no hx of HHc, PT/OT OP, STR, MH or DA  Pt [facility]CP at [facility]  Pts dtr Cyndy Gusman is POA and pt has AD  Return referral sent to [facility]  PT will return once cleared

## 2022-06-06 NOTE — ASSESSMENT & PLAN NOTE
Noted on ct- will -continue antibioitcs   GI input appreciated felt to be diverticular bleed but no evidence of perforation   Likely source of GI bleed  GI on board

## 2022-06-06 NOTE — NURSING NOTE
Nurse entered patients room to set her up for breakfast and give her morning medication  Patient was unresponsive to voice and sternal rubbing from nurse  Rapid Response was called

## 2022-06-06 NOTE — ASSESSMENT & PLAN NOTE
Patient with fever and altered mental status in setting  also acute cystitis with hematuria   elevated lactic aci level -on presentation  being given 2nd iv bolus by Er staff  Blood pressure stabilized  1/2 blood cultures are positive for coagulase-negative Staph-likely contaminant  Repeat blood cultures are negative to date

## 2022-06-06 NOTE — ASSESSMENT & PLAN NOTE
More lethargic on day prior to admission and then had an episode of unresponsiveness on a m   Of admission at assisted living facility  Rule out CVA versus seizure  Neurology on board  Stroke workup ongoing  MRI brain is pending  EEG

## 2022-06-06 NOTE — PHYSICAL THERAPY NOTE
Physical Therapy Cancellation Note       06/06/22 1147   PT Last Visit   PT Visit Date 06/06/22   Note Type   Note type Cancelled Session   Cancel Reasons Medical status   Additional Comments RR this AM for unresponsive episodes and pt only responsive to noxious stimuli;  not appropriate for PT;  will follow up when more appropriate and as schedule allows       Valerie Andersen, PT

## 2022-06-06 NOTE — PROGRESS NOTES
[place]  Progress Note - Betzaida Vogel Dornsife 6/10/1921, 80 y o  female MRN: [id number]  Unit/Bed#: -01 Encounter: [id number]  Primary Care Provider: Alexandria Mora MD   Date and time admitted to hospital: 6/3/2022 12:56 PM    CVA (cerebral vascular accident) [facility])  Assessment & Plan  Patient had rapid response this morning where she was found to be unresponsive  Patient noticed to be bradycardic with minimal response to noxious stimuli  Started on stroke pathway  Neurology consult  CT head and CTA-showed New right BRITTNEY distribution infarct in the paramedian right posterior frontal/parietal lobes  On telemetry  Hold Toprol-XL  P T /OT/ST  HB A1c and lipid panel  Hold aspirin and Plavix in setting of GI bleed  On Lipitor  Neurology follow-up    Acute blood loss anemia  Assessment & Plan  Most likely diverticular bleed source-  GI input appreciated--Dr Bettina Jeffries following  Status post packed red cell transfusion  Hemoglobin this morning is 8 7  On Protonix  Continue to trend    Rectal bleeding  Assessment & Plan  Gi consult appreciated  Status post packed red cell transfusion  Hemoglobin this morning is 8 7  On Protonix  Hope to avoid endoscopy if possible    Acute diverticulitis  Assessment & Plan  Noted on ct- will -continue antibioitcs   GI input appreciated felt to be diverticular bleed but no evidence of perforation   Likely source of GI bleed  GI on board    Cystitis  Assessment & Plan  Urine culture with greater than 100,000 colonies of E coli  Continue Rocephin  Trend WBC and fever curve    Encephalopathy, metabolic  Assessment & Plan  More lethargic on day prior to admission and then had an episode of unresponsiveness on a m   Of admission at assisted living facility  Rule out CVA versus seizure  Neurology on board  Stroke workup ongoing  MRI brain is pending  EEG                                     Gastroesophageal reflux disease without esophagitis  Assessment & Plan  On Protonix    Mild intermittent asthma without complication  Assessment & Plan  Patient without signs of sob- contintinue usual meds    * Sepsis ([address] )  Assessment & Plan  Patient with fever and altered mental status in setting  also acute cystitis with hematuria   elevated lactic aci level -on presentation  being given 2nd iv bolus by Er staff  Blood pressure stabilized  1/2 blood cultures are positive for coagulase-negative Staph-likely contaminant  Repeat blood cultures are negative to date        Labs & Imaging: I have personally reviewed pertinent reports  VTE Prophylaxis: in place  Code Status:   Level 3 - DNAR and DNI    Patient Centered Rounds: I have performed bedside rounds with nursing staff today  Discussions with Specialists or Other Care Team Provider:  Neurology    Education and Discussions with Family / Patient:  Daughter at bedside    Current Length of Stay: 3 day(s)    Current Patient Status: Inpatient   Certification Statement: The patient will continue to require additional inpatient hospital stay due to see my assessment and plan  Subjective:   Patient is seen and examined at bedside  Patient is unresponsive to verbal stimuli  Minimally responsive to sternal rub  Right hand twitching noted  afebrile  All other ROS are negative  Objective:    Vitals: Blood pressure 142/53, pulse 55, temperature 97 9 °F (36 6 °C), resp  rate 20, height 5' 4" (1 626 m), weight 58 8 kg (129 lb 10 1 oz), SpO2 96 %, not currently breastfeeding  ,Body mass index is 22 25 kg/m²    SPO2 RA Rest    Flowsheet Row ED to Hosp-Admission (Current) from 6/3/2022 in [address] Med Surg Unit   SpO2 96 %   SpO2 Activity At Rest   O2 Device None (Room air)   O2 Flow Rate --        I&O:     Intake/Output Summary (Last 24 hours) at 6/6/2022 1247  Last data filed at 6/6/2022 0151  Gross per 24 hour   Intake 350 ml   Output 225 ml   Net 125 ml       Physical Exam:    General- Alert, lying comfortably in bed  Not in any acute distress  Neck- Supple, No JVD  CVS- regular, S1 and S2 normal  Chest- Bilateral Air entry, No rhochi, crackles or wheezing present    Abdomen- soft, nontender, not distended, no guarding or rigidity, BS+  Extremities-  No pedal edema, No calf tenderness  CNS-   minimally responsive to sternal rub    Invasive Devices  Report    Peripheral Intravenous Line  Duration           Peripheral IV 06/05/22 Right;Ventral (anterior) Forearm 1 day          Drain  Duration           Urethral Catheter Temperature probe 2 days                      Social History  reviewed  Family History   Problem Relation Age of Onset    No Known Problems Mother     No Known Problems Father     reviewed    Meds:  Current Facility-Administered Medications   Medication Dose Route Frequency Provider Last Rate Last Admin    albuterol (PROVENTIL HFA,VENTOLIN HFA) inhaler 2 puff  2 puff Inhalation Q4H PRN Yee Fly, DO        aluminum-magnesium hydroxide-simethicone (MYLANTA) oral suspension 30 mL  30 mL Oral Q6H PRN Yee Fly, DO        atorvastatin (LIPITOR) tablet 40 mg  40 mg Oral Daily With MARSHALL Martinez MD        brinzolamide (AZOPT) 1 % ophthalmic suspension 1 drop  1 drop Both Eyes HS Yee Fly, DO   1 drop at 06/04/22 2101    cefTRIAXone (ROCEPHIN) IVPB (premix in dextrose) 1,000 mg 50 mL  1,000 mg Intravenous Daily Yee Fly,  mL/hr at 06/05/22 1349 1,000 mg at 06/05/22 1349    docusate sodium (COLACE) capsule 100 mg  100 mg Oral BID Harris [place], DO        nystatin (MYCOSTATIN) cream   Topical BID Harris [place], DO   Given at 06/05/22 0930    ondansetron (ZOFRAN) injection 4 mg  4 mg Intravenous Q6H PRN Yee Fly, DO        pantoprazole (PROTONIX) EC tablet 40 mg  40 mg Oral Early Morning Yee Fly, DO   40 mg at 06/05/22 0930    PARoxetine (PAXIL) tablet 20 mg  20 mg Oral QAM Yee Fly, DO   20 mg at 06/05/22 0921    sodium chloride (PF) 0 9 % injection 3 mL  3 mL Intravenous Q8H [address] Yee Fly, DO   3 mL at 06/03/22 2200      Medications Prior to Admission   Medication    albuterol (PROAIR HFA) 90 mcg/act inhaler    brinzolamide (AZOPT) 1 % ophthalmic suspension    losartan (COZAAR) 100 MG tablet    metoprolol succinate (TOPROL-XL) 100 mg 24 hr tablet    nystatin (MYCOSTATIN) cream    omeprazole (PriLOSEC) 20 mg delayed release capsule    PARoxetine (PAXIL) 30 mg tablet       Labs:  Results from last 7 days   Lab Units 06/06/22  0935 06/06/22  0929 06/06/22  0924 06/06/22  0336 06/05/22  1152 06/05/22  0447 06/03/22 2125 06/03/22  1311   WBC Thousand/uL 11 86*  --   --  11 47*  --  11 28*   < > 9 25   HEMOGLOBIN g/dL 8 7*  --  8 3* 8 7*   < > 9 1*   < > 10 7*   I STAT HEMOGLOBIN g/dl  --  7 5*  --   --   --   --   --   --    HEMATOCRIT % 26 2*  --  25 1* 26 6*   < > 27 6*   < > 34 3*   HEMATOCRIT, ISTAT %  --  22*  --   --   --   --   --   --    PLATELETS Thousands/uL 191  --   --  174  --  202   < > 330   NEUTROS PCT %  --   --   --   --   --   --   --  59   LYMPHS PCT %  --   --   --   --   --   --   --  34   MONOS PCT %  --   --   --   --   --   --   --  5   EOS PCT %  --   --   --   --   --   --   --  2    < > = values in this interval not displayed       Results from last 7 days   Lab Units 06/06/22  0935 06/06/22  0929 06/06/22  0336 06/05/22  0447 06/04/22  0254   POTASSIUM mmol/L 4 1  --  4 0 3 4* 4 1   CHLORIDE mmol/L 113*  --  113* 115* 110*   CO2 mmol/L 23  --  24 23 23   CO2, I-STAT mmol/L  --  22  --   --   --    BUN mg/dL 23  --  25 19 20   CREATININE mg/dL 0 80  --  0 80 0 81 0 77   CALCIUM mg/dL 8 6  --  8 4 8 3 8 1*   ALK PHOS U/L  --   --  48 54 53   ALT U/L  --   --  10* 9* 10*   AST U/L  --   --  19 24 16   GLUCOSE, ISTAT mg/dl  --  121  --   --   --      Lab Results   Component Value Date    TROPONINI <0 02 12/08/2019    TROPONINI <0 02 11/17/2019    TROPONINI <0 02 11/12/2019     Results from last 7 days   Lab Units 06/06/22  0935 06/03/22  1311   INR  1 15 1 06     Lab Results Component Value Date    BLOODCX Received in Microbiology Lab  Culture in Progress  06/05/2022    BLOODCX Received in Microbiology Lab  Culture in Progress  06/05/2022    BLOODCX No Growth at 48 hrs  06/03/2022    BLOODCX Staphylococcus coagulase negative (A) 06/03/2022    URINECX >100,000 cfu/ml Escherichia coli (A) 06/03/2022    URINECX 40,000-49,000 cfu/ml  06/03/2022    URINECX >100,000 cfu/ml Escherichia coli (A) 12/08/2019    URINECX 60,000-69,000 cfu/ml  12/08/2019    SPUTUMCULTUR 3+ Growth of  11/17/2019    SPUTUMCULTUR  11/17/2019     Commensal respiratory mery only; No significant growth of Staph aureus/MRSA or Pseudomonas aeruginosa  Imaging:  No results found for this or any previous visit  Results for orders placed during the hospital encounter of 12/08/19    XR chest 2 views    Narrative  CHEST    INDICATION:   leukocytosis, HTN     COMPARISON:  11/17/2019    EXAM PERFORMED/VIEWS:  XR CHEST PA & LATERAL  Images: 2    FINDINGS:    Cardiomediastinal silhouette appears enlarged  The pulmonary vessels are normal     Previously described parenchymal density at the left lung base has resolved  There are no infiltrates  No pneumothorax or pleural effusion  S-shaped scoliosis  Impression  No acute cardiopulmonary disease  Cardiomegaly          Workstation performed: [id number]      Last 24 Hours Medication List:   Current Facility-Administered Medications   Medication Dose Route Frequency Provider Last Rate    albuterol  2 puff Inhalation Q4H PRN Yee Fly, DO      aluminum-magnesium hydroxide-simethicone  30 mL Oral Q6H PRN Luz Peak, DO      atorvastatin  40 mg Oral Daily With MARSHALL Martinez MD      brinzolamide  1 drop Both Eyes HS Yee Fly, DO      cefTRIAXone  1,000 mg Intravenous Daily Yee Fly, DO 1,000 mg (06/05/22 5457)    docusate sodium  100 mg Oral BID Luz Peak, DO      nystatin   Topical BID Yee Fly, DO      ondansetron  4 mg Intravenous Q6H PRN Luz Peak, DO  pantoprazole  40 mg Oral Early Morning Yee Fly, DO      PARoxetine  20 mg Oral QAM Yee Fly, DO      sodium chloride (PF)  3 [facility]ous [facility]8H [facility]          Today, Patient Was Seen By: Queen Jere MD    ** Please Note: Dictation voice to text software may have been used in the creation of this document   **

## 2022-06-06 NOTE — ASSESSMENT & PLAN NOTE
8 year old female with history of atrial fibrillation not on anticoagulation prior to admission, presenting initially on 06/03 from assisted living facility with GI bleeding concern and mental status changes (she had episode of unresponsiveness at the nursing facility prior to admission)  She has been undergoing workup for possible diverticular bleeding, GI bleeding, as well as UTI management  Stroke alert called on 6/6 for acute altered mental status; exam with right gaze deviation, right upper extremity tremor and left-sided weakness with stimulation/limited motor testing  Initial and follow-up neuro imaging with MRI brain confirms right BRITTNEY infarction as well as smaller left inferomedial frontal infarct as well  Concern for cardioembolic origin given AFib history and no AP/AC prior to admission  Plan:  -stroke workup completed:  -CT head during alert with right BRITTNEY territory infarct; no acute hemorrhage  -CTA head/neck with right A3 segment occlusion, no other critical stenosis nor large vessel occlusion elsewhere  -MRI brain with moderate/large right BRITTNEY infarct as well as smaller left frontal infarct, embolic origin  -2D echo with EF 65%, no regional wall motion abnormalities, normal atrial size bilaterally    -currently antiplatelet and anticoagulation is on hold due to GI bleed concern: Neurology discussed with family earlier this week: would be hesitant to initiate full anticoagulation for cardioembolic stroke protection given patient's age, bleed risk and ongoing GI bleed concern   -currently severe dysphagia risk: would recommend, if stable from GI/bleeding standpoint and if/when able to take PO meds safely, single antiplatelet therapy with Plavix 75 mg daily if family agreeable  -statin if/when able to take PO reliably  -hemoglobin A1c of 4 8, lipid panel with LDL of 86  -given the unresponsive event and possible concern for seizure, initially given Keppra 3 G load, will not continue any AED  -routine EEG negative for epileptiform discharges/electrographic seizures  -neuro checks  -telemetry monitoring  -provide stroke education to patient/family  -therapy evaluations (Speech therapy, not currently appropriate for PT/OT), remains severe dysphagia risk  -feel goals of care/hospice discussion is appropriate from neurology standpoint, palliative care consulted, will await input  -ongoing medical workup via primary team, GI:  -CBC/blood count monitoring  -holding on endoscopic evaluation at this time given age and other co-morbidities (unless recurrence of bleeding/clinical worsening)

## 2022-06-06 NOTE — OCCUPATIONAL THERAPY NOTE
Occupational Therapy Cx Note     Patient Name: Atul WATTS Date: 6/6/2022  Problem List  Principal Problem:    Sepsis [facility])  Active Problems:    Mild intermittent asthma without complication    Gastroesophageal reflux disease without esophagitis    Encephalopathy, metabolic    Cystitis    Acute diverticulitis    Rectal bleeding    Acute blood loss anemia    [facility]            06/06/22 1300   OT Last Visit   OT Visit Date 06/06/22   Note Type   Note type Cancelled Session   Cancel Reasons Medical status   Additional Comments Pt s/p RRT this morning for unresponsiveness  Will hold and follow at later time/date as appropriate           Char Trent, OTR/L

## 2022-06-06 NOTE — PROGRESS NOTES
Progress note - Gastroenterology   Thi Right Josensife 80 y o  female MRN: [id number]  Unit/Bed#: -01 Encounter: [id number]    ASSESSMENT and PLAN    1  Acute anemia, with now 2nd episode of rectal bleeding   CT abdomen and pelvis negative except for diverticulosis   CT head negative     Check iron panels and give IV iron p r n  Burton Arias   episode of maroon stool 6/4/22 am   Small BM 6/5/22, but not reportedly bloody      Appears this is likely secondary to a diverticular bleed  We have bee treating with supportive care      Hgb stable today 6/6 at 8 7       - trend H&H and transfuse to hemoglobin greater than 8  - okay to do PPI 40 mg daily empirically  - iron 32  Sat 63%, but TIBC very low  Ferritin 64   - given other comorbidities, advanced age, would defer on any endoscopic evaluation at this point, however if hemoglobin continues to drop and/or there is evidence of brisk bleeding, may need to consider prepping and colonoscopy vs IR based therapy  -rapid response/ stroke alert called today 6/6 increased  MRIs pending    Chief Complaint   Patient presents with    Altered Mental Status       SUBJECTIVE/HPI   Rapid response/ stroke alert called this am due to unresponsiveness      /53   Pulse 55   Temp 97 9 °F (36 6 °C)   Resp 20   Ht 5' 4" (1 626 m)   Wt 58 8 kg (129 lb 10 1 oz)   SpO2 96%   BMI 22 25 kg/m²     PHYSICALEXAM  General appearance: alert, appears stated age and cooperative  Eyes: PERLLA, EOMI, no icterus   Head: Normocephalic, without obvious abnormality, atraumatic  Lungs: clear to auscultation bilaterally  Heart: regular rate and rhythm, S1, S2 normal, no murmur, click, rub or gallop  Abdomen: soft, non-tender; bowel sounds normal; no masses,  no organomegaly  Extremities: extremities normal, atraumatic, no cyanosis or edema  Neurologic: Grossly normal    Lab Results   Component Value Date    GLUCOSE 121 06/06/2022    CALCIUM 8 6 06/06/2022     (L) 06/03/2015    K 4 1 06/06/2022    CO2 23 06/06/2022     (H) 06/06/2022    BUN 23 06/06/2022    CREATININE 0 80 06/06/2022     Lab Results   Component Value Date    WBC 11 86 (H) 06/06/2022    HGB 8 7 (L) 06/06/2022    HCT 26 2 (L) 06/06/2022    MCV 90 06/06/2022     06/06/2022     Lab Results   Component Value Date    ALT 10 (L) 06/06/2022    AST 19 06/06/2022    ALKPHOS 48 06/06/2022    BILITOT 0 4 06/03/2015     No results found for: AMYLASE  Lab Results   Component Value Date    LIPASE 62 (L) 06/03/2022     Lab Results   Component Value Date    IRON 63 06/04/2022    TIBC 198 (L) 06/04/2022    FERRITIN 64 06/04/2022     Lab Results   Component Value Date    INR 1 15 06/06/2022

## 2022-06-07 ENCOUNTER — APPOINTMENT (INPATIENT)
Dept: NEUROLOGY | Facility: HOSPITAL | Age: 87
DRG: 871 | End: 2022-06-07
Payer: MEDICARE

## 2022-06-07 LAB
ANION GAP SERPL CALCULATED.3IONS-SCNC: 8 MMOL/L (ref 4–13)
BACTERIA BLD CULT: ABNORMAL
BASOPHILS # BLD AUTO: 0.03 THOUSANDS/ΜL (ref 0–0.1)
BASOPHILS NFR BLD AUTO: 0 % (ref 0–1)
BUN SERPL-MCNC: 16 MG/DL (ref 5–25)
CALCIUM SERPL-MCNC: 9.1 MG/DL (ref 8.3–10.1)
CARDIAC TROPONIN I PNL SERPL HS: 20 NG/L
CHLORIDE SERPL-SCNC: 114 MMOL/L (ref 100–108)
CHOLEST SERPL-MCNC: 153 MG/DL
CO2 SERPL-SCNC: 22 MMOL/L (ref 21–32)
CREAT SERPL-MCNC: 0.69 MG/DL (ref 0.6–1.3)
EOSINOPHIL # BLD AUTO: 0.03 THOUSAND/ΜL (ref 0–0.61)
EOSINOPHIL NFR BLD AUTO: 0 % (ref 0–6)
ERYTHROCYTE [DISTWIDTH] IN BLOOD BY AUTOMATED COUNT: 17.8 % (ref 11.6–15.1)
EST. AVERAGE GLUCOSE BLD GHB EST-MCNC: 91 MG/DL
GFR SERPL CREATININE-BSD FRML MDRD: 71 ML/MIN/1.73SQ M
GLUCOSE SERPL-MCNC: 110 MG/DL (ref 65–140)
GRAM STN SPEC: ABNORMAL
HBA1C MFR BLD: 4.8 %
HCT VFR BLD AUTO: 26.6 % (ref 34.8–46.1)
HDLC SERPL-MCNC: 45 MG/DL
HGB BLD-MCNC: 8.6 G/DL (ref 11.5–15.4)
IMM GRANULOCYTES # BLD AUTO: 0.04 THOUSAND/UL (ref 0–0.2)
IMM GRANULOCYTES NFR BLD AUTO: 0 % (ref 0–2)
LDLC SERPL CALC-MCNC: 86 MG/DL (ref 0–100)
LYMPHOCYTES # BLD AUTO: 1.62 THOUSANDS/ΜL (ref 0.6–4.47)
LYMPHOCYTES NFR BLD AUTO: 14 % (ref 14–44)
MCH RBC QN AUTO: 30 PG (ref 26.8–34.3)
MCHC RBC AUTO-ENTMCNC: 32.3 G/DL (ref 31.4–37.4)
MCV RBC AUTO: 93 FL (ref 82–98)
MONOCYTES # BLD AUTO: 0.78 THOUSAND/ΜL (ref 0.17–1.22)
MONOCYTES NFR BLD AUTO: 7 % (ref 4–12)
NEUTROPHILS # BLD AUTO: 8.8 THOUSANDS/ΜL (ref 1.85–7.62)
NEUTS SEG NFR BLD AUTO: 79 % (ref 43–75)
NRBC BLD AUTO-RTO: 0 /100 WBCS
PLATELET # BLD AUTO: 230 THOUSANDS/UL (ref 149–390)
PMV BLD AUTO: 10 FL (ref 8.9–12.7)
POTASSIUM SERPL-SCNC: 3.5 MMOL/L (ref 3.5–5.3)
RBC # BLD AUTO: 2.87 MILLION/UL (ref 3.81–5.12)
S AUREUS+CONS DNA BLD POS NAA+NON-PROBE: DETECTED
SODIUM SERPL-SCNC: 144 MMOL/L (ref 136–145)
TRIGL SERPL-MCNC: 111 MG/DL
WBC # BLD AUTO: 11.3 THOUSAND/UL (ref 4.31–10.16)

## 2022-06-07 PROCEDURE — 85025 COMPLETE CBC W/AUTO DIFF WBC: CPT | Performed by: INTERNAL MEDICINE

## 2022-06-07 PROCEDURE — 80048 BASIC METABOLIC PNL TOTAL CA: CPT | Performed by: INTERNAL MEDICINE

## 2022-06-07 PROCEDURE — 95816 EEG AWAKE AND DROWSY: CPT | Performed by: STUDENT IN AN ORGANIZED HEALTH CARE EDUCATION/TRAINING PROGRAM

## 2022-06-07 PROCEDURE — 83036 HEMOGLOBIN GLYCOSYLATED A1C: CPT | Performed by: INTERNAL MEDICINE

## 2022-06-07 PROCEDURE — 99233 SBSQ HOSP IP/OBS HIGH 50: CPT | Performed by: INTERNAL MEDICINE

## 2022-06-07 PROCEDURE — 84484 ASSAY OF TROPONIN QUANT: CPT

## 2022-06-07 PROCEDURE — 92610 EVALUATE SWALLOWING FUNCTION: CPT

## 2022-06-07 PROCEDURE — 95816 EEG AWAKE AND DROWSY: CPT

## 2022-06-07 PROCEDURE — 97167 OT EVAL HIGH COMPLEX 60 MIN: CPT

## 2022-06-07 PROCEDURE — 97163 PT EVAL HIGH COMPLEX 45 MIN: CPT

## 2022-06-07 PROCEDURE — 99232 SBSQ HOSP IP/OBS MODERATE 35: CPT | Performed by: PSYCHIATRY & NEUROLOGY

## 2022-06-07 PROCEDURE — 80061 LIPID PANEL: CPT | Performed by: INTERNAL MEDICINE

## 2022-06-07 PROCEDURE — 99232 SBSQ HOSP IP/OBS MODERATE 35: CPT | Performed by: INTERNAL MEDICINE

## 2022-06-07 RX ADMIN — SODIUM CHLORIDE 3 ML: 9 INJECTION, SOLUTION INTRAMUSCULAR; INTRAVENOUS; SUBCUTANEOUS at 13:52

## 2022-06-07 RX ADMIN — NYSTATIN: 100000 CREAM TOPICAL at 09:17

## 2022-06-07 RX ADMIN — CEFTRIAXONE 1000 MG: 1 INJECTION, SOLUTION INTRAVENOUS at 13:51

## 2022-06-07 RX ADMIN — SODIUM CHLORIDE 3 ML: 9 INJECTION, SOLUTION INTRAMUSCULAR; INTRAVENOUS; SUBCUTANEOUS at 06:04

## 2022-06-07 RX ADMIN — SODIUM CHLORIDE 3 ML: 9 INJECTION, SOLUTION INTRAMUSCULAR; INTRAVENOUS; SUBCUTANEOUS at 22:24

## 2022-06-07 RX ADMIN — BRINZOLAMIDE 1 DROP: 10 SUSPENSION/ DROPS OPHTHALMIC at 22:23

## 2022-06-07 RX ADMIN — NYSTATIN: 100000 CREAM TOPICAL at 17:49

## 2022-06-07 NOTE — ASSESSMENT & PLAN NOTE
Gi consult appreciated  Status post packed red cell transfusion  Hemoglobin last evening is 8  Awaiting blood work from this morning  On Protonix  Hope to avoid endoscopy if possible

## 2022-06-07 NOTE — ASSESSMENT & PLAN NOTE
Most likely diverticular bleed source-  GI input appreciated--Dr Betzaida Ortiz following  Status post packed red cell transfusion  Hemoglobin is 8  Awaiting blood work from this morning  On Protonix  Continue to trend

## 2022-06-07 NOTE — PROGRESS NOTES
Progress Note - Neurology   Joselyn Hardy Josenseda 80 y o  female [id number]  Unit/Bed#: /-01    Assessment:    CVA (cerebral vascular accident) [facility])  Assessment & Plan  8 year old female with history of atrial fibrillation not on anticoagulation prior to admission, presenting initially on 06/03 from assisted living facility with GI bleeding concern and mental status changes (she had episode of unresponsiveness at the nursing facility prior to admission)  She has been undergoing workup for possible diverticular bleeding, GI bleeding, as well as UTI management  Stroke alert called on 6/6 for acute altered mental status; exam with some right gaze deviation, right upper extremity tremor and left-sided weakness with stimulation/limited motor testing  Imaging revealed new right BRITTNEY infarct with distal right BRITTNEY territory occlusion; likely cardioembolic in the setting of atrial fibrillation and lack of AC/antiplatelet due to GI bleed concern      Plan:  -initiate stroke workup:  -CT head during alert with right BRITTNEY territory infarct; no acute hemorrhage  -CTA head/neck with right A3 segment occlusion, no other critical stenosis nor large vessel occlusion elsewhere  -MRI brain pending  -consider 2D echo, although unlikely to change stroke management as mentioned above  -currently antiplatelet and anticoagulation is on hold due to GI bleed concern: Neurology discussed with family yesterday, would be hesitant to initiate full anticoagulation for cardioembolic stroke protection given patient's age, bleed risk and ongoing GI bleed concern   -would recommend, if stable from GI/bleeding standpoint and able to take PO, single antiplatelet therapy with Plavix 75 mg daily if family is agreeable  -continue statin  -hemoglobin A1c and lipid panel pending  -given the unresponsive event and possible concern for seizure, given Keppra 3 G load, will hold on maintenance dosing of Keppra and assess clinically  -routine EEG pending  -neuro checks  -telemetry monitoring  -provide stroke education to patient/family  -therapy evaluations if/when clinically appropriate  -consider future/forthcoming goals of care discussion based on clinical course  -ongoing medical workup via primary team, GI:  -CBC/blood count monitoring  -holding on endoscopic evaluation at this time given age and other co-morbidities (unless recurrence of bleeding/clinical worsening)     Recommendations for outpatient neurological follow up have yet to be determined  Case and treatment plan reviewed with attending neurologist, Dr Franchesca Hernandez  Subjective:   Patient resting in bed  She attempts to verbalize but her speech is intelligible  She continues to have a right gaze preference and left-sided weakness  Past Medical History:   Diagnosis Date    A-fib [facility])     Gastrointestinal hemorrhage associated with intestinal diverticulitis     Osteoarthritis     UTI (urinary tract infection)      History reviewed  No pertinent surgical history  Family History   Problem Relation Age of Onset    No Known Problems Mother     No Known Problems Father      Social History     Socioeconomic History    Marital status:       Spouse name: None    Number of children: None    Years of education: None    Highest education level: None   Occupational History    None   Tobacco Use    Smoking status: Former Smoker    Smokeless tobacco: Never Used   Vaping Use    Vaping Use: Never used   Substance and Sexual Activity    Alcohol use: Never    Drug use: No    Sexual activity: Not Currently   Other Topics Concern    None   Social History Narrative    Dental care: occasionally    Lives w/family    Living situation: supportive and safe    No advance directives     Social Determinants of Health     Financial Resource Strain: Not on file   Food Insecurity: No Food Insecurity    Worried About [address] in the Last Year: Never true    Kayla of [employer] in the Last Year: Never true   Transportation Needs: No Transportation Needs    Lack of Transportation (Medical): No    Lack of Transportation (Non-Medical): No   Physical Activity: Not on file   Stress: Not on file   Social Connections: Not on file   Intimate Partner Violence: Not on file   Housing Stability: Unknown    Unable to Pay for Housing in the Last Year: Not on file    Number of Places Lived in the Last Year: Not on file    Unstable Housing in the Last Year: No         Medications: All current active meds have been reviewed and current meds:  Scheduled Meds:  Current Facility-Administered Medications   Medication Dose Route Frequency Provider Last Rate    albuterol  2 puff Inhalation Q4H PRN Yee Fly, DO      aluminum-magnesium hydroxide-simethicone  30 mL Oral Q6H PRN Yee Fly, DO      brinzolamide  1 drop Both Eyes HS Yee Fly, DO      cefTRIAXone  1,000 mg Intravenous Daily Yee Fly, DO 1,000 mg (06/06/22 1319)    nystatin   Topical BID Yee Fly, DO      ondansetron  4 mg Intravenous Q6H PRN Yee Fly, DO      sodium chloride (PF)  3 mL Intravenous Q8H [address] Yee Fly, DO       Continuous Infusions:   PRN Meds:   albuterol    aluminum-magnesium hydroxide-simethicone    ondansetron       ROS:   Review of Systems   Unable to perform ROS: Mental status change       Vitals:   /69   Pulse 77   Temp 98 8 °F (37 1 °C)   Resp 14   Ht 5' 4" (1 626 m)   Wt 60 3 kg (132 lb 15 oz)   SpO2 100%   BMI 22 82 kg/m²     Physical exam performed by attending neurologist:    Physical Exam:   Physical Exam  Vitals and nursing note reviewed  Constitutional:       General: She is not in acute distress  Comments: Elderly, frail-appearing female   HENT:      Head: Normocephalic  Mouth/Throat:      Mouth: Mucous membranes are dry  Eyes:      General: No scleral icterus  Right eye: No discharge  Left eye: No discharge        Conjunctiva/sclera: Conjunctivae normal  Cardiovascular:      Rate and Rhythm: Normal rate  Pulmonary:      Effort: Pulmonary effort is normal  No respiratory distress  Musculoskeletal:         General: Normal range of motion  Cervical back: Normal range of motion  Skin:     General: Skin is warm and dry  Coloration: Skin is pale  Skin is not jaundiced  Neurological:      Mental Status: She is alert  Coordination: Finger-nose-finger test: Unable to follow command  Neurologic Exam     Mental Status   Awake and alert  Attempts to verbalize but speech is mostly incomprehensible  Follows minimal commands  Cranial Nerves   CN exam limited due to AMS  Decreased blink to threat on left  Right gaze preference  unable to track examiner to the left side bilaterally  No obvious facial asymmetry/droop  Unable to follow command to protrude tongue     Motor Exam   Muscle bulk: decreased  Formal strength testing limited due to inability to follow commands  Purposeful movement in right upper extremity noted  Was able to lift right upper extremity up spontaneously  No movement noted in left upper extremity, unable to maintain antigravity at all when passively raised  Minimal movement noted in bilateral LE (r>l)     Gait, Coordination, and Reflexes     Coordination   Finger-nose-finger test: Unable to follow command  Reflexes   Right plantar: normal  Left plantar: upgoing  Right upper extremity tremoring at rest and with action         Labs: I have personally reviewed pertinent reports  Recent Results (from the past 24 hour(s))   Hemoglobin and hematocrit, blood    Collection Time: 06/06/22  7:55 PM   Result Value Ref Range    Hemoglobin 8 0 (L) 11 5 - 15 4 g/dL    Hematocrit 24 7 (L) 34 8 - 46 1 %       Imaging: I have personally reviewed pertinent imaging in PACS, and I have personally reviewed PACS reports  EKG, Pathology, and Other Studies: I have personally reviewed pertinent reports         VTE Prophylaxis: Sequential compression device (Venodyne)         Total time spent today 24 minutes  Greater than 50% of total time was spent with the patient and / or family counseling and / or coordination of care  A description of the counseling / coordination of care: Patient seen and evaluated  Case reviewed with attending  Chart thoroughly reviewed including imaging and labs    Coordination of care with primary team

## 2022-06-07 NOTE — PLAN OF CARE
Problem: Potential for Falls  Goal: Patient will remain free of falls  Description: INTERVENTIONS:  - Educate patient/family on patient safety including physical limitations  - Instruct patient to call for assistance with activity   - Consult OT/PT to assist with strengthening/mobility   - Keep Call bell within reach  - Keep bed low and locked with side rails adjusted as appropriate  - Keep care items and personal belongings within reach  - Initiate and maintain comfort rounds  - Make Fall Risk Sign visible to staff  - Offer Toileting every 2 Hours, in advance of need  - Initiate/Maintain bed alarm  - Obtain necessary fall risk management equipment:   - Apply yellow socks and bracelet for high fall risk patients  - Consider moving patient to room near nurses station  Outcome: Progressing     Problem: GASTROINTESTINAL - ADULT  Goal: Minimal or absence of nausea and/or vomiting  Description: INTERVENTIONS:  - Administer IV fluids if ordered to ensure adequate hydration  - Maintain NPO status until nausea and vomiting are resolved  - Nasogastric tube if ordered  - Administer ordered antiemetic medications as needed  - Provide nonpharmacologic comfort measures as appropriate  - Advance diet as tolerated, if ordered  - Consider nutrition services referral to assist patient with adequate nutrition and appropriate food choices  Outcome: Progressing  Goal: Maintains or returns to baseline bowel function  Description: INTERVENTIONS:  - Assess bowel function  - Encourage oral fluids to ensure adequate hydration  - Administer IV fluids if ordered to ensure adequate hydration  - Administer ordered medications as needed  - Encourage mobilization and activity  - Consider nutritional services referral to assist patient with adequate nutrition and appropriate food choices  Outcome: Progressing  Goal: Maintains adequate nutritional intake  Description: INTERVENTIONS:  - Monitor percentage of each meal consumed  - Identify factors contributing to decreased intake, treat as appropriate  - Assist with meals as needed  - Monitor I&O, weight, and lab values if indicated  - Obtain nutrition services referral as needed  Outcome: Progressing  Goal: Establish and maintain optimal ostomy function  Description: INTERVENTIONS:  - Assess bowel function  - Encourage oral fluids to ensure adequate hydration  - Administer IV fluids if ordered to ensure adequate hydration   - Administer ordered medications as needed  - Encourage mobilization and activity  - Nutrition services referral to assist patient with appropriate food choices  - Assess stoma site  - Consider wound care consult   Outcome: Progressing  Goal: Oral mucous membranes remain intact  Description: INTERVENTIONS  - Assess oral mucosa and hygiene practices  - Implement preventative oral hygiene regimen  - Implement oral medicated treatments as ordered  - Initiate Nutrition services referral as needed  Outcome: Progressing     Problem: GENITOURINARY - ADULT  Goal: Maintains or returns to baseline urinary function  Description: INTERVENTIONS:  - Assess urinary function  - Encourage oral fluids to ensure adequate hydration if ordered  - Administer IV fluids as ordered to ensure adequate hydration  - Administer ordered medications as needed  - Offer frequent toileting  - Follow urinary retention protocol if ordered  Outcome: Progressing  Goal: Absence of urinary retention  Description: INTERVENTIONS:  - Assess patients ability to void and empty bladder  - Monitor I/O  - Bladder scan as needed  - Discuss with physician/AP medications to alleviate retention as needed  - Discuss catheterization for long term situations as appropriate  Outcome: Progressing  Goal: Urinary catheter remains patent  Description: INTERVENTIONS:  - Assess patency of urinary catheter  - If patient has a chronic edwards, consider changing catheter if non-functioning  - Follow guidelines for intermittent irrigation of non-functioning urinary catheter  Outcome: Progressing     Problem: METABOLIC, FLUID AND ELECTROLYTES - ADULT  Goal: Electrolytes maintained within normal limits  Description: INTERVENTIONS:  - Monitor labs and assess patient for signs and symptoms of electrolyte imbalances  - Administer electrolyte replacement as ordered  - Monitor response to electrolyte replacements, including repeat lab results as appropriate  - Instruct patient on fluid and nutrition as appropriate  Outcome: Progressing  Goal: Fluid balance maintained  Description: INTERVENTIONS:  - Monitor labs   - Monitor I/O and WT  - Instruct patient on fluid and nutrition as appropriate  - Assess for signs & symptoms of volume excess or deficit  Outcome: Progressing  Goal: Glucose maintained within target range  Description: INTERVENTIONS:  - Monitor Blood Glucose as ordered  - Assess for signs and symptoms of hyperglycemia and hypoglycemia  - Administer ordered medications to maintain glucose within target range  - Assess nutritional intake and initiate nutrition service referral as needed  Outcome: Progressing     Problem: SKIN/TISSUE INTEGRITY - ADULT  Goal: Skin Integrity remains intact(Skin Breakdown Prevention)  Description: Assess:  -Perform Miguel assessment every shift  -Clean and moisturize skin every shift  -Inspect skin when repositioning, toileting, and assisting with ADLS  -Assess under medical devices such as  every   -Assess extremities for adequate circulation and sensation     Bed Management:  -Have minimal linens on bed & keep smooth, unwrinkled  -Change linens as needed when moist or perspiring  -Avoid sitting or lying in one position for more than 2 hours while in bed  -Keep HOB at 30 degrees     Toileting:  -Offer bedside commode  -Assess for incontinence every shift  -Use incontinent care products after each incontinent episode such as     Activity:  -Mobilize patient 2 times a day  -Encourage activity and walks on unit  -Encourage or provide ROM exercises   -Turn and reposition patient every 2 Hours  -Use appropriate equipment to lift or move patient in bed  -Instruct/ Assist with weight shifting every shift  when out of bed in chair  -Consider limitation of chair time 2 hour intervals    Skin Care:  -Avoid use of baby powder, tape, friction and shearing, hot water or constrictive clothing  -Relieve pressure over bony prominences using   -Do not massage red bony areas    Next Steps:  -Teach patient strategies to minimize risks such as    -Consider consults to  interdisciplinary teams such as   Outcome: Progressing  Goal: Incision(s), wounds(s) or drain site(s) healing without S/S of infection  Description: INTERVENTIONS  - Assess and document dressing, incision, wound bed, drain sites and surrounding tissue  - Provide patient and family education  - Perform skin care/dressing changes every shift  Outcome: Progressing  Goal: Pressure injury heals and does not worsen  Description: Interventions:  - Implement low air loss mattress or specialty surface (Criteria met)  - Apply silicone foam dressing  - Instruct/assist with weight shifting every 30 minutes when in chair   - Limit chair time to 2 hour intervals  - Use special pressure reducing interventions such as  when in chair   - Apply fecal or urinary incontinence containment device   - Perform passive or active ROM every shift  - Turn and reposition patient & offload bony prominences every 2 hours   - Utilize friction reducing device or surface for transfers   - Consider consults to  interdisciplinary teams such as   - Use incontinent care products after each incontinent episode such as   - Consider nutrition services referral as needed  Outcome: Progressing     Problem: MUSCULOSKELETAL - ADULT  Goal: Maintain or return mobility to safest level of function  Description: INTERVENTIONS:  - Assess patient's ability to carry out ADLs; assess patient's baseline for ADL function and identify physical deficits which impact ability to perform ADLs (bathing, care of mouth/teeth, toileting, grooming, dressing, etc )  - Assess/evaluate cause of self-care deficits   - Assess range of motion  - Assess patient's mobility  - Assess patient's need for assistive devices and provide as appropriate  - Encourage maximum independence but intervene and supervise when necessary  - Involve family in performance of ADLs  - Assess for home care needs following discharge   - Consider OT consult to assist with ADL evaluation and planning for discharge  - Provide patient education as appropriate  Outcome: Progressing  Goal: Maintain proper alignment of affected body part  Description: INTERVENTIONS:  - Support, maintain and protect limb and body alignment  - Provide patient/ family with appropriate education  Outcome: Progressing     Problem: MOBILITY - ADULT  Goal: Maintain or return to baseline ADL function  Description: INTERVENTIONS:  -  Assess patient's ability to carry out ADLs; assess patient's baseline for ADL function and identify physical deficits which impact ability to perform ADLs (bathing, care of mouth/teeth, toileting, grooming, dressing, etc )  - Assess/evaluate cause of self-care deficits   - Assess range of motion  - Assess patient's mobility; develop plan if impaired  - Assess patient's need for assistive devices and provide as appropriate  - Encourage maximum independence but intervene and supervise when necessary  - Involve family in performance of ADLs  - Assess for home care needs following discharge   - Consider OT consult to assist with ADL evaluation and planning for discharge  - Provide patient education as appropriate  Outcome: Progressing  Goal: Maintains/Returns to pre admission functional level  Description: INTERVENTIONS:  - Perform BMAT or MOVE assessment daily    - Set and communicate daily mobility goal to care team and patient/family/caregiver     - Collaborate with rehabilitation services on mobility goals if consulted  - Perform Range of Motion 2 times a day  - Reposition patient every 2 hours    - Dangle patient 2 times a day  - Stand patient 2 times a day  - Ambulate patient 2 times a day  - Out of bed to chair 2 times a day   - Out of bed for meals 2 times a day  - Out of bed for toileting  - Record patient progress and toleration of activity level   Outcome: Progressing     Problem: Prexisting or High Potential for Compromised Skin Integrity  Goal: Skin integrity is maintained or improved  Description: INTERVENTIONS:  - Identify patients at risk for skin breakdown  - Assess and monitor skin integrity  - Assess and monitor nutrition and hydration status  - Monitor labs   - Assess for incontinence   - Turn and reposition patient  - Assist with mobility/ambulation  - Relieve pressure over bony prominences  - Avoid friction and shearing  - Provide appropriate hygiene as needed including keeping skin clean and dry  - Evaluate need for skin moisturizer/barrier cream  - Collaborate with interdisciplinary team   - Patient/family teaching  - Consider wound care consult   Outcome: Progressing     Problem: HEMATOLOGIC - ADULT  Goal: Maintains hematologic stability  Description: INTERVENTIONS  - Assess for signs and symptoms of bleeding or hemorrhage  - Monitor labs  - Administer supportive blood products/factors as ordered and appropriate  Outcome: Progressing

## 2022-06-07 NOTE — PROGRESS NOTES
Progress note - Gastroenterology   Radha Daley 80 y o  female MRN: [id number]  Unit/Bed#: -01 Encounter: [id number]    ASSESSMENT and PLAN  1  Acute anemia, with now 2nd episode of rectal bleeding   CT abdomen and pelvis negative except for diverticulosis   CT head negative     Check iron panels and give IV iron p r n  Marleta Press   episode of maroon stool 6/4/22 am   Small BM 6/5/22, but not reportedly bloody      Appears this is likely secondary to a diverticular bleed   We have bee treating with supportive care      Hgb stable today 6/6 at 8 7       - trend H&H and transfuse to hemoglobin greater than 8  - okay to do PPI 40 mg daily empirically  - iron 32   Sat 63%, but TIBC very low  Ferritin 59   - given other comorbidities, advanced age, would defer on any endoscopic evaluation at this point, however if hemoglobin continues to drop and/or there is evidence of brisk bleeding, may need to consider prepping and colonoscopy vs IR based therapy      -rapid response/ stroke alert called  6/6 increased  MRI showed acute CVA- neurology on board  If hemoglobin remains stable with no further bleeding which it has been, okay to start aspirin or Plavix  Agree with not doing full anticoagulation  Chief Complaint   Patient presents with    Altered Mental Status       SUBJECTIVE/HPI   Patient recent comfortably in bed      /69   Pulse 77   Temp 98 8 °F (37 1 °C)   Resp 14   Ht 5' 4" (1 626 m)   Wt 60 3 kg (132 lb 15 oz)   SpO2 100%   BMI 22 82 kg/m²     PHYSICALEXAM  General appearance: alert, appears stated age and cooperative  Eyes: PERLLA, EOMI, no icterus   Head: Normocephalic, without obvious abnormality, atraumatic  Lungs: clear to auscultation bilaterally  Heart: regular rate and rhythm, S1, S2 normal, no murmur, click, rub or gallop  Abdomen: soft, non-tender; bowel sounds normal; no masses,  no organomegaly  Extremities: extremities normal, atraumatic, no cyanosis or edema  Neurologic: Grossly normal    Lab Results   Component Value Date    GLUCOSE 121 06/06/2022    CALCIUM 8 6 06/06/2022     (L) 06/03/2015    K 4 1 06/06/2022    CO2 23 06/06/2022     (H) 06/06/2022    BUN 23 06/06/2022    CREATININE 0 80 06/06/2022     Lab Results   Component Value Date    WBC 11 86 (H) 06/06/2022    HGB 8 0 (L) 06/06/2022    HCT 24 7 (L) 06/06/2022    MCV 90 06/06/2022     06/06/2022     Lab Results   Component Value Date    ALT 10 (L) 06/06/2022    AST 19 06/06/2022    ALKPHOS 48 06/06/2022    BILITOT 0 4 06/03/2015     No results found for: AMYLASE  Lab Results   Component Value Date    LIPASE 62 (L) 06/03/2022     Lab Results   Component Value Date    IRON 63 06/04/2022    TIBC 198 (L) 06/04/2022    FERRITIN 64 06/04/2022     Lab Results   Component Value Date    INR 1 15 06/06/2022

## 2022-06-07 NOTE — PLAN OF CARE
Problem: PHYSICAL THERAPY ADULT  Goal: Performs mobility at highest level of function for planned discharge setting  See evaluation for individualized goals  Description: Treatment/Interventions: ADL retraining, Functional transfer training, LE strengthening/ROM, Elevations, Therapeutic exercise, Endurance training, Cognitive reorientation, Patient/family training, Equipment eval/education, Bed mobility, Gait training, Compensatory technique education, Continued evaluation, Spoke to MD, Spoke to nursing, OT          See flowsheet documentation for full assessment, interventions and recommendations  Note: Prognosis: Fair  Problem List: Decreased strength, Decreased range of motion, Decreased endurance, Impaired balance, Decreased mobility, Decreased coordination, Decreased cognition, Impaired judgement, Decreased safety awareness, Impaired hearing, Impaired vision, Impaired sensation, Impaired tone, Obesity, Decreased skin integrity  Assessment: Pt is a 80 y o  female who presented to ED 6/3/22 with c/o change in mental status with unresponsive episode from nursing home  Dx:  Gallstones, GIB, hemorrhoids, sepsis, UTI, Cystitis, encephalopathy;  6/6/22: For change in mental status, unresponsive episodes were short in nature and self-resolving, Upon evaluation at the bedside patient is only responsive to noxious stimuli to which she withdrawals;   CVA protocol  Comorbidities affecting pt's physical performance at time of assessment include: dementia, a-fib, UTI  Personal factors affecting pt at time of IE include: R-sided gaze, L neglect, non-verbal  PLOF and home set up listed above  Upon evaluation: pt inconsistently able to follow commands on RUE; pt unable to track to L past midline; (+) babinski on L foot; No clonus in BLE and BUE  Pt unsafe to attempt sitting EOB  Full objective findings from PT assessment regarding body systems outlined above   Current limitations include impaired balance, decreased endurance/activity tolerance, L neglect, fall risk, weakness, cognition  Pt's clinical presentation is currently unstable/unpredictable seen in pt's presentation of continuous monitoring in hospital, evolving symptoms  Pt would benefit from continued PT while in hospital and follow up with inpt rehab at D/C to increase strength, balance, endurance, independence with funcitonal mobility to return to PLOF, maximize independence, decrease caregiver burden and improve quality of life  PT/OT co-evaluation for pt's current medical status, mobility/balance deficits, and cognitive deficits requiring 2 skilled therapists  The patient's AM-PAC Basic Mobility Inpatient Short Form Raw Score is 6  A Raw score of less than or equal to 17 suggests the patient may benefit from discharge to post-acute rehabilitation services  Please also refer to the recommendation of the Physical Therapist for safe discharge planning  PT Discharge Recommendation: Post acute rehabilitation services          See flowsheet documentation for full assessment

## 2022-06-07 NOTE — SPEECH THERAPY NOTE
Speech Language/Pathology  Speech/Language Pathology  Assessment    Patient Name: Vi Guadarrama  UXTYO'N Date: 6/7/2022     Problem List  Principal Problem:    Sepsis ([facility] [address] )  Active Problems:    Mild intermittent asthma without complication    Gastroesophageal reflux disease without esophagitis    Encephalopathy, metabolic    Cystitis    Acute diverticulitis    Rectal bleeding    Acute blood loss anemia    CVA (cerebral vascular accident) ([facility] [address] )    Unresponsive episode    Past Medical History  Past Medical History:   Diagnosis Date    A-fib [facility])     Gastrointestinal hemorrhage associated with intestinal diverticulitis     Osteoarthritis     UTI (urinary tract infection)      Past Surgical History  History reviewed  No pertinent surgical history  Bedside Swallow Evaluation:    Summary:  Pt presents w/ severe oral, suspected at least mild pharyngeal dysphagia  Pt w/ weak and incomplete lip closure and reduced bolus control resulting in anterior spill of all  No attempt bolus manipulation or formation, no transfers initiated  No pharyngeal swallows initiated during assessment  Maximal attempts trialed to initiate swallow, verbal cues, piped liquid wash, and oropharyngeal tactile stimulation not successful in eliciting swallow  All material removed from oral cavity  Pt is at high risk for aspiration at this time, not appropriate for PO diet       Recommendations:  Diet: NPO  Liquid: NPO  Meds: NPO  Supervision:-  Positioning:-  Strategies:-  Oral care: 3-4x daily   Aspiration precautions  Reflux precautions    Therapy Prognosis: Fair-guarded  Prognosis considerations: age, current medical, past medical  Frequency: As able and appropriate     Consider consult w/:  Rehab    H&P/Admit info/ pertinent provider notes: (PMH noted above)  8 year old female with history of atrial fibrillation not on anticoagulation prior to admission, presenting initially on 06/03 from assisted living facility with GI bleeding concern and mental status changes (she had episode of unresponsiveness at the nursing facility prior to admission)  She has been undergoing workup for possible diverticular bleeding, GI bleeding, as well as UTI management      Stroke alert called on 6/6 for acute altered mental status; exam with some right gaze deviation, right upper extremity tremor and left-sided weakness with stimulation/limited motor testing      Imaging revealed new right BRITTNEY infarct with distal right BRITTNEY territory occlusion; likely cardioembolic in the setting of atrial fibrillation and lack of AC/antiplatelet due to GI bleed concern  Special Studies:  CT head 6/3: No acute intracranial abnormality  Unchanged microangiopathic changes      CT chest 6/3:   1  Technically limited study due to motion  2   No acute abnormality identified in the chest, abdomen, or pelvis  3   Gallstones without evidence for cholecystitis or biliary obstruction  4   Additional findings as noted  CT stroke 6/6: New right BRITTNEY distribution infarct in the paramedian right posterior frontal/parietal lobes  No acute hemorrhage or mass effect      CTA stroke 6/6: Right A3 occlusion  No other large vessel occlusion  Multifocal intracranial stenosis  No significant stenosis of the cervical carotid or vertebral arteries  Previous VBS: No      Goal(s):  Pt will tolerate least restrictive diet w/out s/s aspiration or oral/pharyngeal difficulties  Patient's goal: None stated     Did the pt report pain? No  If yes, was nursing notified/was it addressed? N/A    Reason for consult:  R/o aspiration  Determine safest and least restrictive diet  Change in mental status  New neuro event  Stroke protocol  H/o neurological disease  h/o dysphagia       Precautions:  Fall    Food allergies: None    Current diet: NPO    Premorbid diet::Soft selections w/ thin    O2 requirement: RA    Voice/Speech: Clear    Social: longterm    Follows commands:  No Cognitive Status: Awake and alert     Oral [facility] exam:  Dentition: Full set of dentures   Labial strength and ROM: weak, right facial droop   Mandibular strength and ROM: weak  Unable to perform thorough exam 2/2 limited command following     Items administered:  Puree, thin liquids  Liquids were piped via straw as pt was unable to draw    Oral stage:  Lip closure: weak and incomplete   Mastication: -  Bolus formation: absent   Bolus control: reduced, anterior spill of all   Transfer: absent   Oral residue: residual after anterior spill removed from oral cavity   Pocketing: no    Pharyngeal stage:  Pharyngeal swallow not initiated during assessment       Esophageal stage:  H/o GERD    Results d/w:  Pt, nursing, family, physician

## 2022-06-07 NOTE — ASSESSMENT & PLAN NOTE
Patient diagnosed with CVA  On stroke pathway  Neurology consult  CT head and CTA-showed New right BRITTNEY distribution infarct in the paramedian right posterior frontal/parietal lobes  On telemetry  Hold Toprol-XL  P T /OT/ST  HB A1c and lipid panel reviewed  Hold aspirin and Plavix in setting of GI bleed  As per GI hemoglobin is stable can start on Plavix    Speech and swallow evaluation as patient is NPO  MRI brain and EEGs is pending  On [facility]  Neurology follow-up

## 2022-06-07 NOTE — ASSESSMENT & PLAN NOTE
Urine culture with greater than 100,000 colonies of E coli  Continue Rocephin  Trend WBC and fever curve

## 2022-06-07 NOTE — PROGRESS NOTES
[place]  Progress Note - Betzaida Vogel Dornsife 6/10/1921, 80 y o  female MRN: [id number]  Unit/Bed#: -01 Encounter: [id number]  Primary Care Provider: Alexandria Mora MD   Date and time admitted to hospital: 6/3/2022 12:56 PM    CVA (cerebral vascular accident) [facility])  Assessment & Plan  Patient diagnosed with CVA  On stroke pathway  Neurology consult  CT head and CTA-showed New right BRITTNEY distribution infarct in the paramedian right posterior frontal/parietal lobes  On telemetry  Hold Toprol-XL  P T /OT/ST  HB A1c and lipid panel reviewed  Hold aspirin and Plavix in setting of GI bleed  As per GI hemoglobin is stable can start on Plavix  Speech and swallow evaluation as patient is NPO  MRI brain and EEGs is pending  On Lipitor  Neurology follow-up    Acute blood loss anemia  Assessment & Plan  Most likely diverticular bleed source-  GI input appreciated--Dr Bettina Jeffries following  Status post packed red cell transfusion  Hemoglobin is 8  Awaiting blood work from this morning  On Protonix  Continue to trend    Rectal bleeding  Assessment & Plan  Gi consult appreciated  Status post packed red cell transfusion  Hemoglobin last evening is 8  Awaiting blood work from this morning  On Protonix  Hope to avoid endoscopy if possible    Acute diverticulitis  Assessment & Plan  Noted on ct- will -continue antibioitcs   GI input appreciated felt to be diverticular bleed but no evidence of perforation   Likely source of GI bleed  GI on board    Cystitis  Assessment & Plan  Urine culture with greater than 100,000 colonies of E coli  Continue Rocephin  Trend WBC and fever curve    Encephalopathy, metabolic  Assessment & Plan  More lethargic on day prior to admission and then had an episode of unresponsiveness on a m   Of admission at assisted living facility  In setting of CVA and sepsis secondary to UTI  Neurology on board  Stroke workup ongoing  MRI brain is pending  EEG is pending Gastroesophageal reflux disease without esophagitis  Assessment & Plan  On Protonix    Mild intermittent asthma without complication  Assessment & Plan  Patient without signs of sob- contintinue usual meds    * Sepsis ([address] )  Assessment & Plan  Patient with fever and altered mental status in setting  also acute cystitis with hematuria   elevated lactic aci level -on presentation  being given 2nd iv bolus by Er staff  Blood pressure stabilized  1/2 blood cultures are positive for coagulase-negative Staph-likely contaminant  Repeat blood cultures are negative to date        Labs & Imaging: I have personally reviewed pertinent reports  VTE Prophylaxis: in place  Code Status:   Level 3 - DNAR and DNI    Patient Centered Rounds: I have performed bedside rounds with nursing staff today  Discussions with Specialists or Other Care Team Provider:  Neurology    Education and Discussions with Family / Patient:  Daughter Marycarmen Velazquez    Current Length of Stay: 4 day(s)    Current Patient Status: Inpatient   Certification Statement: The patient will continue to require additional inpatient hospital stay due to see my assessment and plan  Subjective:   Patient is seen and examined at bedside  She attempts to verbalize but her speech is intelligible  She continues to have a right gaze preference and left-sided weakness  Afebrile  All other ROS are negative  Objective:    Vitals: Blood pressure 164/69, pulse 77, temperature 98 8 °F (37 1 °C), resp  rate 14, height 5' 4" (1 626 m), weight 60 3 kg (132 lb 15 oz), SpO2 100 %, not currently breastfeeding  ,Body mass index is 22 82 kg/m²    SPO2 RA Rest    Flowsheet Row ED to Hosp-Admission (Current) from 6/3/2022 in [address] Med Surg Unit   SpO2 100 %   SpO2 Activity At Rest   O2 Device None (Room air)   O2 Flow Rate --        I&O:     Intake/Output Summary (Last 24 hours) at 6/7/2022 1119  Last data filed at 6/7/2022 0624  Gross per 24 hour Intake 0 ml   Output 925 ml   Net -925 ml       Physical Exam:    General- Awake, lying comfortably in bed  Not in any acute distress  Neck- Supple, No JVD  CVS- regular, S1 and S2 normal  Chest- Bilateral Air entry, No rhochi, crackles or wheezing present  Abdomen- soft, nontender, not distended, no guarding or rigidity, BS+  Extremities-  No pedal edema, No calf tenderness  CNS-   Alert, awake  Left-sided weakness    Dysarthria    Invasive Devices  Report    Peripheral Intravenous Line  Duration           Peripheral IV 06/05/22 Right;Ventral (anterior) Forearm 2 days    Peripheral IV 06/06/22 Left Antecubital 1 day          Drain  Duration           Urethral Catheter Temperature probe 3 days                      Social History  reviewed  Family History   Problem Relation Age of Onset    No Known Problems Mother     No Known Problems Father     reviewed    Meds:  Current Facility-Administered Medications   Medication Dose Route Frequency Provider Last Rate Last Admin    albuterol (PROVENTIL HFA,VENTOLIN HFA) inhaler 2 puff  2 puff Inhalation Q4H PRN Yee Fly, DO        aluminum-magnesium hydroxide-simethicone (MYLANTA) oral suspension 30 mL  30 mL Oral Q6H PRN Yee Fly, DO        brinzolamide (AZOPT) 1 % ophthalmic suspension 1 drop  1 drop Both Eyes HS Yee Fly, DO   1 drop at 06/06/22 2312    cefTRIAXone (ROCEPHIN) IVPB (premix in dextrose) 1,000 mg 50 mL  1,000 mg Intravenous Daily Yee Fly,  mL/hr at 06/06/22 1319 1,000 mg at 06/06/22 1319    nystatin (MYCOSTATIN) cream   Topical BID April Kehr, DO   Given at 06/07/22 0917    ondansetron (ZOFRAN) injection 4 mg  4 mg Intravenous Q6H PRN Yee Fly, DO        sodium chloride (PF) 0 9 % injection 3 mL  3 mL Intravenous Q8H [facility] Yee Fly, DO   3 mL at 06/07/22 0604      Medications Prior to Admission   Medication    albuterol (PROAIR HFA) 90 mcg/act inhaler    brinzolamide (AZOPT) 1 % ophthalmic suspension    losartan (COZAAR) 100 MG tablet    metoprolol succinate (TOPROL-XL) 100 mg 24 hr tablet    nystatin (MYCOSTATIN) cream    omeprazole (PriLOSEC) 20 mg delayed release capsule    PARoxetine (PAXIL) 30 mg tablet       Labs:  Results from last 7 days   Lab Units 06/06/22 1955 06/06/22 0935 06/06/22 0929 06/06/22  0924 06/06/22  0336 06/05/22  1152 06/05/22  0447 06/03/22 2125 06/03/22  1311   WBC Thousand/uL  --  11 86*  --   --  11 47*  --  11 28*   < > 9 25   HEMOGLOBIN g/dL 8 0* 8 7*  --    < > 8 7*   < > 9 1*   < > 10 7*   I STAT HEMOGLOBIN g/dl  --   --  7 5*  --   --   --   --   --   --    HEMATOCRIT % 24 7* 26 2*  --    < > 26 6*   < > 27 6*   < > 34 3*   HEMATOCRIT, ISTAT %  --   --  22*  --   --   --   --   --   --    PLATELETS Thousands/uL  --  191  --   --  174  --  202   < > 330   NEUTROS PCT %  --   --   --   --   --   --   --   --  59   LYMPHS PCT %  --   --   --   --   --   --   --   --  34   MONOS PCT %  --   --   --   --   --   --   --   --  5   EOS PCT %  --   --   --   --   --   --   --   --  2    < > = values in this interval not displayed  Results from last 7 days   Lab Units 06/06/22 0935 06/06/22 0929 06/06/22  0336 06/05/22  0447 06/04/22  0254   POTASSIUM mmol/L 4 1  --  4 0 3 4* 4 1   CHLORIDE mmol/L 113*  --  113* 115* 110*   CO2 mmol/L 23  --  24 23 23   CO2, I-STAT mmol/L  --  22  --   --   --    BUN mg/dL 23  --  25 19 20   CREATININE mg/dL 0 80  --  0 80 0 81 0 77   CALCIUM mg/dL 8 6  --  8 4 8 3 8 1*   ALK PHOS U/L  --   --  48 54 53   ALT U/L  --   --  10* 9* 10*   AST U/L  --   --  19 24 16   GLUCOSE, ISTAT mg/dl  --  121  --   --   --      Lab Results   Component Value Date    TROPONINI <0 02 12/08/2019    TROPONINI <0 02 11/17/2019    TROPONINI <0 02 11/12/2019     Results from last 7 days   Lab Units 06/06/22  0935 06/03/22  1311   INR  1 15 1 06     Lab Results   Component Value Date    BLOODCX No Growth at 24 hrs  06/05/2022    BLOODCX No Growth at 24 hrs  06/05/2022    BLOODCX No Growth at 72 hrs  06/03/2022    BLOODCX Staphylococcus coagulase negative (A) 06/03/2022    URINECX >100,000 cfu/ml Escherichia coli (A) 06/03/2022    URINECX 40,000-49,000 cfu/ml  06/03/2022    URINECX >100,000 cfu/ml Escherichia coli (A) 12/08/2019    URINECX 60,000-69,000 cfu/ml  12/08/2019    SPUTUMCULTUR 3+ Growth of  11/17/2019    SPUTUMCULTUR  11/17/2019     Commensal respiratory mery only; No significant growth of Staph aureus/MRSA or Pseudomonas aeruginosa  Imaging:  No results found for this or any previous visit  Results for orders placed during the hospital encounter of 12/08/19    XR chest 2 views    Narrative  CHEST    INDICATION:   leukocytosis, HTN     COMPARISON:  11/17/2019    EXAM PERFORMED/VIEWS:  XR CHEST PA & LATERAL  Images: 2    FINDINGS:    Cardiomediastinal silhouette appears enlarged  The pulmonary vessels are normal     Previously described parenchymal density at the left lung base has resolved  There are no infiltrates  No pneumothorax or pleural effusion  S-shaped scoliosis  Impression  No acute cardiopulmonary disease  Cardiomegaly  Workstation performed: [id number]      Last 24 Hours Medication List:   Current Facility-Administered Medications   Medication Dose Route Frequency Provider Last Rate    albuterol  2 puff Inhalation Q4H PRN Yee Fly, DO      aluminum-magnesium hydroxide-simethicone  30 mL Oral Q6H PRN Eye Fly, DO      brinzolamide  1 drop Both Eyes HS Yee Fly, DO      cefTRIAXone  1,000 mg Intravenous Daily Yee Fly, DO 1,000 mg (06/06/22 1319)    nystatin   Topical BID Yee Fly, DO      ondansetron  4 mg Intravenous Q6H PRN Yee Fly, DO      sodium chloride (PF)  3 mL Intravenous Q8H [place] DO Ethel          Today, Patient Was Seen By: Abisai Sanchez MD    ** Please Note: Dictation voice to text software may have been used in the creation of this document   **

## 2022-06-07 NOTE — OCCUPATIONAL THERAPY NOTE
Occupational Therapy Evaluation     Patient Name: Robin RODRÍGUEZ'E Date: 6/7/2022  Problem List  Principal Problem:    Sepsis ([facility] )  Active Problems:    Mild intermittent asthma without complication    Gastroesophageal reflux disease without esophagitis    Encephalopathy, metabolic    Cystitis    Acute diverticulitis    Rectal bleeding    Acute blood loss anemia    CVA (cerebral vascular accident) ([facility] )    Unresponsive episode    Past Medical History  Past Medical History:   Diagnosis Date    A-fib [facility])     Gastrointestinal hemorrhage associated with intestinal diverticulitis     Osteoarthritis     UTI (urinary tract infection)      Past Surgical History  History reviewed  No pertinent surgical history  06/07/22 1420   OT Last Visit   OT Visit Date 06/07/22   Note Type   Note type Evaluation   Restrictions/Precautions   Weight Bearing Precautions Per Order No   Other Precautions Fall Risk;Bed Alarm;Cognitive   Pain Assessment   Pain Assessment Tool FLACC   Pain Rating: FLACC (Rest) - Face 0   Pain Rating: FLACC (Rest) - Legs 0   Pain Rating: FLACC (Rest) - Activity 0   Pain Rating: FLACC (Rest) - Cry 0   Pain Rating: FLACC (Rest) - Consolability 0   Score: FLACC (Rest) 0   Pain Rating: FLACC (Activity) - Face 0   Pain Rating: FLACC (Activity) - Legs 0   Pain Rating: FLACC (Activity) - Activity 0   Pain Rating: FLACC (Activity) - Cry 0   Pain Rating: FLACC (Activity) - Consolability 0   Score: FLACC (Activity) 0   Home Living   Type of Home SNF   Home Layout One level   Home Equipment Walker; Wheelchair-manual   Additional Comments Per daughter at bedside, pt spends most of her day in a wheelchair  Performs transfers with RW with staff present  Prior Function   Level of [place] Needs assistance with ADLs and functional mobility; Needs assistance with IADLs   Lives With Facility staff   Receives Help From Personal care attendant   ADL Assistance Needs assistance   IADLs Needs assistance Comments Pt unable to provide social hx as pt is mostly non-verbal  Daughter at bedside verbalizes that pt requried assist at baseline  Subjective   Subjective Pt received in supine position  Head turn to right with + right visual gaze   ADL   Eating Assistance Unable to assess   Eating Deficit NPO  (per SLP)   Grooming Assistance 1  Total Assistance   UB Bathing Assistance 1  Total Assistance   LB Bathing Assistance 1  Total Assistance   UB Dressing Assistance 1  Total Assistance   LB Dressing Assistance 1  Total [address]  1  Total Assistance   Bed Mobility   Supine to Sit Unable to assess   Additional Comments Pt with poor ability to follow commands  Transfers inappropriate at this time  Transfers   Sit to Stand Unable to assess   Activity Tolerance   Activity Tolerance   (Treatment limited 2/2 cognition; decreased ability to follow commands)   Medical Staff Made Aware PT KAHLIL Lund Assessment   RUE Assessment   (AAROM WFL, unable to follow consistent commands for accurate MMT  Appears to be at least 3-/5 to 3/5 strength)   LUE Assessment   LUE Assessment   (PROM WFL, unable to follow consistent commands for accurate MMT  Appears to be 0/5 strengh )   Sensation   Additional Comments JOS 2/2 cognitiion, decreased ability to verbalize/follow commands     Vision - Complex Assessment   Head Position Head turned  (to right, with right visual field gaze)   Tracking   (Inconsistetly tracks to midline)   Visual Fields Unable to test secondary due to decreased visual attention   Perception   Inattention/Neglect   (Unable to attend to left side)   Cognition   Overall Cognitive Status Impaired   Arousal/Participation Arousable   Attention JOS   Orientation Level Unable to assess   Memory Unable to assess   Following Commands Follows one step commands inconsistently  (Able to squeeze R hand and perform R elbow extension inconsistently)   Assessment   Limitation Decreased ADL status; Decreased self-care trans;Decreased high-level ADLs; Decreased UE ROM; Decreased UE strength;Decreased Safe judgement during ADL;Decreased cognition;Visual deficit; Non-func L UE   Prognosis Guarded   Assessment Pt is a 80 y o  female seen for OT evaluation at [facility], admitted 6/3/2022 w/ rectal bleeding  Pt found have Acute diverticulitis  Hospital course complicated by Stroke alert on 6/6  CT Brain revealed new right BRITTNEY distribution infarct in the paramedian right posterior frontal/parietal lobes  OT completed extensive review of pt's medical and social history  Comorbidities affecting pt's functional performance at time of assessment include: dementia, sepsis, and encephalopathy  Personal factors affecting pt at time of IE include:difficulty performing ADLS, difficulty performing IADLS , limited insight into deficits, decreased initiation and engagement  and decreased mentation  Prior to admission, pt was living at  [facility]  Pt required assist with w/ ADLS and IADLs and primarily relied on wheelchair for mobility  Pt however able to mobilize with use of RW to perform transfers with staff  Upon evaluation pt requires total A for all ADLs and unable to participate in any functional transfers at this time 2/2 the following deficits impacting occupational performance: weakness, decreased strength, decreased balance, decreased tolerance, impaired GMC, impaired attention, impaired initiation, impaired memory, impaired problem solving, impaired interpersonal skills and right gaze preference  Full objective findings from OT assessment regarding body systems outlined above  Pt to benefit from continued skilled OT tx while in the hospital to address deficits as defined above and maximize level of functional independence w ADL's and functional mobility  Occupational Performance areas to address include: grooming, bathing/shower, toilet hygiene, dressing and functional mobility   Based on findings, pt is of high complexity  The patient's raw score on the AM-PAC Daily Activity inpatient short form is 6, standardized score is 14 9  , less than 39 4  Patients at this level are likely to benefit from DC to post-acute rehabilitation services, which DOES coincide with CURRENT above OT recommendations  However please refer to therapist recommendation for discharge planning given other factors that may influence destination  At this time, OT recommendations at time of discharge are short term rehab  Goals   Patient Goals Pt unable to verbalize goals  Plan   Treatment Interventions ADL retraining;Visual perceptual retraining;Functional transfer training;UE strengthening/ROM; Patient/family training;Equipment evaluation/education;Cognitive reorientation; Neuromuscular reeducation; Fine motor coordination activities; Compensatory technique education   Goal Expiration Date 06/17/22   OT Treatment Day 0   OT Frequency 3-5x/wk   Recommendation   OT Discharge Recommendation Post acute rehabilitation services   [facility] Daily Activity Inpatient   Lower Body Dressing 1   Bathing 1   Toileting 1   Upper Body Dressing 1   Grooming 1   Eating 1   Daily Activity Raw Score 6   Turning Head Towards Sound 1   Follow Simple Instructions 2   Low Function Daily Activity Raw Score 9   Low Function Daily Activity Standardized Score 14 9   AM-PAC Applied Cognition Inpatient   Following a Speech/Presentation 1   Understanding Ordinary Conversation 1   Taking Medications 1   Remembering Where Things Are Placed or Put Away 1   Remembering List of 4-5 Errands 1   Taking Care of Complicated Tasks 1   Applied Cognition Raw Score 6   Applied Cognition Standardized Score 7 69           Pt will achieve the following goals within 10 days  *Pt will complete grooming with mod A with RUE      *Pt will complete UB bathing and dressing with mod A     *Pt will complete bed mobility with mod A x 1, with to prep for purposeful tasks    *Pt will demonstrate improved LUE strength by 1-2 MMT grade to enhance ADLS and functional transfers     *Pt will sit on EOB for 5 minutes for increased safety with seated activity tolerance during ADL tasks  *Pt will follow 50% simple commands with cues as needed in order to perform functional task  *Pt will be able to attend to left visual field with mod verbal/tactile cues in order to participate in ADL task        Sangeetha Casey, OTR/L

## 2022-06-07 NOTE — ASSESSMENT & PLAN NOTE
More lethargic on day prior to admission and then had an episode of unresponsiveness on a m   Of admission at assisted living facility  In setting of CVA and sepsis secondary to UTI  Neurology on board  Stroke workup ongoing  MRI brain is pending  EEG is pending

## 2022-06-07 NOTE — PLAN OF CARE
Problem: OCCUPATIONAL THERAPY ADULT  Goal: Performs self-care activities at highest level of function for planned discharge setting  See evaluation for individualized goals  Description: Treatment Interventions: ADL retraining, Visual perceptual retraining, Functional transfer training, UE strengthening/ROM, Patient/family training, Equipment evaluation/education, Cognitive reorientation, Neuromuscular reeducation, Fine motor coordination activities, Compensatory technique education          See flowsheet documentation for full assessment, interventions and recommendations  Note: Limitation: Decreased ADL status, Decreased self-care trans, Decreased high-level ADLs, Decreased UE ROM, Decreased UE strength, Decreased Safe judgement during ADL, Decreased cognition, Visual deficit, Non-func L UE  Prognosis: Guarded  Assessment: Pt is a 80 y o  female seen for OT evaluation at [facility], admitted 6/3/2022 w/ rectal bleeding  Pt found have Acute diverticulitis  Hospital course complicated by Stroke alert on 6/6  CT Brain revealed new right BRITTNEY distribution infarct in the paramedian right posterior frontal/parietal lobes  OT completed extensive review of pt's medical and social history  Comorbidities affecting pt's functional performance at time of assessment include: dementia, sepsis, and encephalopathy  Personal factors affecting pt at time of IE include:difficulty performing ADLS, difficulty performing IADLS , limited insight into deficits, decreased initiation and engagement  and decreased mentation  Prior to admission, pt was living at  [facility]  Pt required assist with w/ ADLS and IADLs and primarily relied on wheelchair for mobility  Pt however able to mobilize with use of RW to perform transfers with staff   Upon evaluation pt requires total A for all ADLs and unable to participate in any functional transfers at this time 2/2 the following deficits impacting occupational performance: weakness, decreased strength, decreased balance, decreased tolerance, impaired GMC, impaired attention, impaired initiation, impaired memory, impaired problem solving, impaired interpersonal skills and right gaze preference  Full objective findings from OT assessment regarding body systems outlined above  Pt to benefit from continued skilled OT tx while in the hospital to address deficits as defined above and maximize level of functional independence w ADL's and functional mobility  Occupational Performance areas to address include: grooming, bathing/shower, toilet hygiene, dressing and functional mobility  Based on findings, pt is of high complexity  The patient's raw score on the AM-PAC Daily Activity inpatient short form is 6, standardized score is 14 9  , less than 39 4  Patients at this level are likely to benefit from DC to post-acute rehabilitation services, which DOES coincide with CURRENT above OT recommendations  However please refer to therapist recommendation for discharge planning given other factors that may influence destination  At this time, OT recommendations at time of discharge are short term rehab       OT Discharge Recommendation: Post acute rehabilitation services

## 2022-06-07 NOTE — PHYSICAL THERAPY NOTE
PHYSICAL THERAPY Evaluation    Performed at least 2 patient identifiers during session:  Patient Active Problem List   Diagnosis    Essential hypertension    Mild intermittent asthma without complication    Minor depression    Gastroesophageal reflux disease without esophagitis    Unsteady gait    Pneumonia    Sepsis ([facility] )    Hyponatremia    Hypercalcemia    Encephalopathy, metabolic    Cystitis    Acute diverticulitis    Rectal bleeding    Acute blood loss anemia    CVA (cerebral vascular accident) ([facility] )    Unresponsive episode       Past Medical History:   Diagnosis Date    A-fib ([place] [address] )     Gastrointestinal hemorrhage associated with intestinal diverticulitis     Osteoarthritis     UTI (urinary tract infection)        History reviewed  No pertinent surgical history  06/07/22 1419   PT Last Visit   PT Visit Date 06/07/22   Note Type   Note type Evaluation   Pain Assessment   Pain Assessment Tool [place]-Baker FACES   [place]-Baker FACES Pain Rating 0   Restrictions/Precautions   Weight Bearing Precautions Per Order No   Other Precautions Bed Alarm;Cognitive; Fall Risk  (Pt with L sided neglect)   Home Living   Type of Home SNF   Home Layout One level   [address] [place] [address],[address]; Wheelchair-manual   Additional Comments pt sits in [facility] throughout the day;  pt only walks when staff is with her, RW  Daughter states pt enjoys to be social    Prior Function   Level of [place] Needs assistance with ADLs and functional mobility; Needs assistance with IADLs   Lives With Facility staff   Receives Help From Personal care attendant   ADL Assistance Needs assistance   IADLs Needs assistance   Comments PLOF/history obtained from Daughter   General   Additional Pertinent History pt with R gaze; R head deviation; L sided neglect;  unable to track past midline    [place] at baseline   Family/Caregiver Present Yes  (Daughter, Goldy Calender)   Cognition   Overall Cognitive Status Impaired   Arousal/Participation Responsive   Orientation Level Unable to assess  (unable to vocalize)   Memory Unable to assess  (unable to vocalize)   Following Commands Follows one step commands inconsistently   Subjective   Subjective pt attempts to vocalize "Hi" unable to vocalize anything else this session  RUE Assessment   RUE Assessment   (AAROM WFL, grossly 3-/5, see OT note for details)   LUE Assessment   LUE Assessment   (0/5 strength;  PROM WFL)   RLE Assessment   RLE Assessment   (PROM WFL, unable to follow commands for formal MMT)   LLE Assessment   LLE Assessment   (PROM WFL, unable to follow commands for formal MMT;  (+) babinski;  no clonus )   Bed Mobility   Supine to Sit Unable to assess  (not appropriate at this time, unable to follow commands, severe L sided neglect;  will continue to assess for appropriatness)   Transfers   Sit to Stand Unable to assess   Activity Tolerance   Activity Tolerance Treatment limited secondary to medical complications (Comment)   Nurse Made Aware Luisa   Assessment   Prognosis Fair   Problem List Decreased strength;Decreased range of motion;Decreased endurance; Impaired balance;Decreased mobility; Decreased coordination;Decreased cognition; Impaired judgement;Decreased safety awareness; Impaired hearing; Impaired vision; Impaired sensation; Impaired tone;Obesity; Decreased skin integrity   Assessment Pt is a 80 y o  female who presented to ED 6/3/22 with c/o change in mental status with unresponsive episode from nursing home  Dx:  Gallstones, GIB, hemorrhoids, sepsis, UTI, Cystitis, encephalopathy;  6/6/22: For change in mental status, unresponsive episodes were short in nature and self-resolving, Upon evaluation at the bedside patient is only responsive to noxious stimuli to which she withdrawals;   CVA protocol  Comorbidities affecting pt's physical performance at time of assessment include: dementia, a-fib, UTI   Personal factors affecting pt at time of IE include: R-sided gaze, L neglect, non-verbal  PLOF and home set up listed above  Upon evaluation: pt inconsistently able to follow commands on RUE; pt unable to track to L past midline; (+) babinski on L foot; No clonus in BLE and BUE  Pt unsafe to attempt sitting EOB  Full objective findings from PT assessment regarding body systems outlined above  Current limitations include impaired balance, decreased endurance/activity tolerance, L neglect, fall risk, weakness, cognition  Pt's clinical presentation is currently unstable/unpredictable seen in pt's presentation of continuous monitoring in hospital, evolving symptoms  Pt would benefit from continued PT while in hospital and follow up with inpt rehab at D/C to increase strength, balance, endurance, independence with funcitonal mobility to return to PLOF, maximize independence, decrease caregiver burden and improve quality of life  PT/OT co-evaluation for pt's current medical status, mobility/balance deficits, and cognitive deficits requiring 2 skilled therapists  The patient's AM-PAC Basic Mobility Inpatient Short Form Raw Score is 6  A Raw score of less than or equal to 17 suggests the patient may benefit from discharge to post-acute rehabilitation services  Please also refer to the recommendation of the Physical Therapist for safe discharge planning  Goals   Patient Goals pt does not verbalize goal;  maximize independence and quality of life   STG Expiration Date 06/21/22   Short Term Goal #1 Pt will be able to demo:  mod A x 1 for rolling L and R in bed for pressure relief and to improve hygiene and decrease caregiver burden;  mod A x 1 for sit to/from supine;  mod A x 1 to maintain sitting balance x8 minutes at EOB;  PT to re-assess and update goals when goals met and ready to stand  Plan   Treatment/Interventions ADL retraining;Functional transfer training;LE strengthening/ROM; Elevations; Therapeutic exercise; Endurance training;Cognitive reorientation;Patient/family training;Equipment eval/education; Bed mobility;Gait training; Compensatory technique education;Continued evaluation;Spoke to MD;Spoke to nursing;OT   PT Frequency 3-5x/wk   Recommendation   PT Discharge Recommendation Post acute rehabilitation services   AM-PAC Basic Mobility Inpatient   Turning in Bed Without Bedrails 1   Lying on Back to Sitting on Edge of Flat Bed 1   Moving Bed to Chair 1   Standing Up From Chair 1   Walk in Room 1   Climb 3-5 Stairs 1   Basic Mobility Inpatient Raw Score 6   Turning Head Towards Sound 2   Follow Simple Instructions 1   Low Function Basic Mobility Raw Score 9   Low Function Basic Mobility Standardized Score 12 55   Highest Level Of Mobility   JH-HLM Goal 2: Bed activities/Dependent transfer   JH-HLM Achieved 2: Bed activities/Dependent transfer     Guzman Cherry, PT      Patient Name: Kelsie Thomas  RMCXA'[id number] Date: 6/7/2022

## 2022-06-08 ENCOUNTER — APPOINTMENT (INPATIENT)
Dept: MRI IMAGING | Facility: HOSPITAL | Age: 87
DRG: 871 | End: 2022-06-08
Payer: MEDICARE

## 2022-06-08 ENCOUNTER — APPOINTMENT (INPATIENT)
Dept: NON INVASIVE DIAGNOSTICS | Facility: HOSPITAL | Age: 87
DRG: 871 | End: 2022-06-08
Payer: MEDICARE

## 2022-06-08 LAB
ANION GAP SERPL CALCULATED.3IONS-SCNC: 6 MMOL/L (ref 4–13)
AORTIC ROOT: 2.9 CM
APICAL FOUR CHAMBER EJECTION FRACTION: 46 %
BASOPHILS # BLD AUTO: 0.01 THOUSANDS/ΜL (ref 0–0.1)
BASOPHILS NFR BLD AUTO: 0 % (ref 0–1)
BUN SERPL-MCNC: 14 MG/DL (ref 5–25)
CALCIUM SERPL-MCNC: 8.8 MG/DL (ref 8.3–10.1)
CHLORIDE SERPL-SCNC: 114 MMOL/L (ref 100–108)
CO2 SERPL-SCNC: 25 MMOL/L (ref 21–32)
CREAT SERPL-MCNC: 0.62 MG/DL (ref 0.6–1.3)
EOSINOPHIL # BLD AUTO: 0.06 THOUSAND/ΜL (ref 0–0.61)
EOSINOPHIL NFR BLD AUTO: 1 % (ref 0–6)
ERYTHROCYTE [DISTWIDTH] IN BLOOD BY AUTOMATED COUNT: 17.5 % (ref 11.6–15.1)
FRACTIONAL SHORTENING: 34 % (ref 28–44)
GFR SERPL CREATININE-BSD FRML MDRD: 74 ML/MIN/1.73SQ M
GLUCOSE SERPL-MCNC: 102 MG/DL (ref 65–140)
HCT VFR BLD AUTO: 24.1 % (ref 34.8–46.1)
HGB BLD-MCNC: 7.8 G/DL (ref 11.5–15.4)
IMM GRANULOCYTES # BLD AUTO: 0.05 THOUSAND/UL (ref 0–0.2)
IMM GRANULOCYTES NFR BLD AUTO: 1 % (ref 0–2)
INTERVENTRICULAR SEPTUM IN DIASTOLE (PARASTERNAL SHORT AXIS VIEW): 1.5 CM
INTERVENTRICULAR SEPTUM: 1.5 CM (ref 0.6–1.1)
LAAS-AP2: 9.1 CM2
LAAS-AP4: 18.6 CM2
LEFT ATRIUM AREA SYSTOLE SINGLE PLANE A4C: 17.9 CM2
LEFT ATRIUM SIZE: 3.3 CM
LEFT INTERNAL DIMENSION IN SYSTOLE: 2.5 CM (ref 2.1–4)
LEFT VENTRICLE DIASTOLIC VOLUME (MOD BIPLANE): 68 ML
LEFT VENTRICLE SYSTOLIC VOLUME (MOD BIPLANE): 35 ML
LEFT VENTRICULAR INTERNAL DIMENSION IN DIASTOLE: 3.8 CM (ref 3.5–6)
LEFT VENTRICULAR POSTERIOR WALL IN END DIASTOLE: 1.2 CM
LEFT VENTRICULAR STROKE VOLUME: 40 ML
LV EF: 49 %
LVSV (TEICH): 40 ML
LYMPHOCYTES # BLD AUTO: 1.14 THOUSANDS/ΜL (ref 0.6–4.47)
LYMPHOCYTES NFR BLD AUTO: 12 % (ref 14–44)
MAGNESIUM SERPL-MCNC: 2.1 MG/DL (ref 1.6–2.6)
MCH RBC QN AUTO: 30.5 PG (ref 26.8–34.3)
MCHC RBC AUTO-ENTMCNC: 32.4 G/DL (ref 31.4–37.4)
MCV RBC AUTO: 94 FL (ref 82–98)
MONOCYTES # BLD AUTO: 0.88 THOUSAND/ΜL (ref 0.17–1.22)
MONOCYTES NFR BLD AUTO: 9 % (ref 4–12)
NEUTROPHILS # BLD AUTO: 7.2 THOUSANDS/ΜL (ref 1.85–7.62)
NEUTS SEG NFR BLD AUTO: 77 % (ref 43–75)
NRBC BLD AUTO-RTO: 0 /100 WBCS
PLATELET # BLD AUTO: 227 THOUSANDS/UL (ref 149–390)
PMV BLD AUTO: 9.9 FL (ref 8.9–12.7)
POTASSIUM SERPL-SCNC: 3.2 MMOL/L (ref 3.5–5.3)
RBC # BLD AUTO: 2.56 MILLION/UL (ref 3.81–5.12)
RIGHT ATRIUM AREA SYSTOLE A4C: 16.1 CM2
RIGHT VENTRICLE ID DIMENSION: 2.7 CM
SL CV LEFT ATRIUM LENGTH A2C: 3.2 CM
SL CV LV EF: 65
SL CV PED ECHO LEFT VENTRICLE DIASTOLIC VOLUME (MOD BIPLANE) 2D: 63 ML
SL CV PED ECHO LEFT VENTRICLE SYSTOLIC VOLUME (MOD BIPLANE) 2D: 23 ML
SODIUM SERPL-SCNC: 145 MMOL/L (ref 136–145)
TR MAX PG: 49 MMHG
TR PEAK VELOCITY: 3.5 M/S
TRICUSPID VALVE PEAK REGURGITATION VELOCITY: 3.5 M/S
WBC # BLD AUTO: 9.34 THOUSAND/UL (ref 4.31–10.16)

## 2022-06-08 PROCEDURE — 99232 SBSQ HOSP IP/OBS MODERATE 35: CPT | Performed by: INTERNAL MEDICINE

## 2022-06-08 PROCEDURE — 83735 ASSAY OF MAGNESIUM: CPT | Performed by: INTERNAL MEDICINE

## 2022-06-08 PROCEDURE — 70551 MRI BRAIN STEM W/O DYE: CPT

## 2022-06-08 PROCEDURE — 92526 ORAL FUNCTION THERAPY: CPT

## 2022-06-08 PROCEDURE — 85025 COMPLETE CBC W/AUTO DIFF WBC: CPT | Performed by: INTERNAL MEDICINE

## 2022-06-08 PROCEDURE — G1004 CDSM NDSC: HCPCS

## 2022-06-08 PROCEDURE — 93306 TTE W/DOPPLER COMPLETE: CPT

## 2022-06-08 PROCEDURE — 80048 BASIC METABOLIC PNL TOTAL CA: CPT | Performed by: INTERNAL MEDICINE

## 2022-06-08 PROCEDURE — 93306 TTE W/DOPPLER COMPLETE: CPT | Performed by: INTERNAL MEDICINE

## 2022-06-08 RX ORDER — POTASSIUM CHLORIDE 14.9 MG/ML
20 INJECTION INTRAVENOUS
Status: COMPLETED | OUTPATIENT
Start: 2022-06-08 | End: 2022-06-08

## 2022-06-08 RX ORDER — DEXTROSE AND SODIUM CHLORIDE 5; .9 G/100ML; G/100ML
75 INJECTION, SOLUTION INTRAVENOUS CONTINUOUS
Status: DISCONTINUED | OUTPATIENT
Start: 2022-06-08 | End: 2022-06-09

## 2022-06-08 RX ADMIN — DEXTROSE AND SODIUM CHLORIDE 75 ML/HR: 5; .9 INJECTION, SOLUTION INTRAVENOUS at 13:46

## 2022-06-08 RX ADMIN — NYSTATIN: 100000 CREAM TOPICAL at 09:43

## 2022-06-08 RX ADMIN — SODIUM CHLORIDE 3 ML: 9 INJECTION, SOLUTION INTRAMUSCULAR; INTRAVENOUS; SUBCUTANEOUS at 04:45

## 2022-06-08 RX ADMIN — POTASSIUM CHLORIDE 20 MEQ: 14.9 INJECTION, SOLUTION INTRAVENOUS at 13:46

## 2022-06-08 RX ADMIN — BRINZOLAMIDE 1 DROP: 10 SUSPENSION/ DROPS OPHTHALMIC at 22:33

## 2022-06-08 RX ADMIN — CEFTRIAXONE 1000 MG: 1 INJECTION, SOLUTION INTRAVENOUS at 13:46

## 2022-06-08 RX ADMIN — SODIUM CHLORIDE 3 ML: 9 INJECTION, SOLUTION INTRAMUSCULAR; INTRAVENOUS; SUBCUTANEOUS at 22:33

## 2022-06-08 RX ADMIN — SODIUM CHLORIDE 3 ML: 9 INJECTION, SOLUTION INTRAMUSCULAR; INTRAVENOUS; SUBCUTANEOUS at 15:52

## 2022-06-08 RX ADMIN — POTASSIUM CHLORIDE 20 MEQ: 14.9 INJECTION, SOLUTION INTRAVENOUS at 15:51

## 2022-06-08 NOTE — PROGRESS NOTES
[place]  Progress Note - Juliana Garciansife 6/10/1921, 80 y o  female MRN: [id number]  Unit/Bed#: -01 Encounter: [id number]  Primary Care Provider: Fausto Gregory MD   Date and time admitted to hospital: 6/3/2022 12:56 PM    CVA (cerebral vascular accident) [facility])  Assessment & Plan  Patient diagnosed with CVA  On stroke pathway  Neurology consult appreciated  CT head and CTA-showed New right BRITTNEY distribution infarct in the paramedian right posterior frontal/parietal lobes  On telemetry  Hold Toprol-XL  P T /OT/ST  HB A1c and lipid panel reviewed  Hold aspirin and Plavix in setting of GI bleed  As per GI hemoglobin is stable can start on Plavix  Speech and swallow evaluation as patient is NPO  MRI brain pending  EEG results reviewed  On Lipitor  Neurology follow-up  Patient was seen by speech specialist and they recommend continue NPO  Discussed with daughter at length and she feels that family would not want to go ahead with tube feeding    Discussed with her and she agreed for palliative care consult    Acute blood loss anemia  Assessment & Plan  Most likely diverticular bleed source-  GI input appreciated--Dr BURGESS [facility] following  Status post packed red cell transfusion  Hemoglobin is 7 8  On Protonix  Continue to trend    Rectal bleeding  Assessment & Plan  Gi consult appreciated  Status post packed red cell transfusion  Hemoglobin last evening is 7 8  No active bleeding overnight  On Protonix  Hope to avoid endoscopy if possible per GI    Acute diverticulitis  Assessment & Plan  Noted on ct- will -continue antibioitcs   GI input appreciated felt to be diverticular bleed but no evidence of perforation   Likely source of GI bleed  GI on board    Cystitis  Assessment & Plan  Urine culture with greater than 100,000 colonies of E coli  Continue Rocephin  Trend WBC and fever curve    Encephalopathy, metabolic  Assessment & Plan  More lethargic on day prior to admission and then had an episode of unresponsiveness on a m  Of admission at assisted living facility  In setting of CVA and sepsis secondary to UTI  Neurology on board  Stroke workup ongoing  MRI brain is pending  EEG  results are reviewed                               Gastroesophageal reflux disease without esophagitis  Assessment & Plan  On Protonix    Mild intermittent asthma without complication  Assessment & Plan  Patient without signs of sob- contintinue usual meds    * Sepsis ([facility] [address] )  Assessment & Plan  Patient with fever and altered mental status in setting  also acute cystitis with hematuria   elevated lactic aci level -on presentation  being given 2nd iv bolus by Er staff  Blood pressure stabilized  1/2 blood cultures are positive for coagulase-negative Staph-likely contaminant  Repeat blood cultures are negative to date        Labs & Imaging: I have personally reviewed pertinent reports  VTE Prophylaxis: in place  Code Status:   Level 3 - DNAR and DNI    Patient Centered Rounds: I have performed bedside rounds with nursing staff today  Discussions with Specialists or Other Care Team Provider:  Neurology    Education and Discussions with Family / Patient:  Daughter at bedside    Current Length of Stay: 5 day(s)    Current Patient Status: Inpatient   Certification Statement: The patient will continue to require additional inpatient hospital stay due to see my assessment and plan  Subjective:   Patient is seen and examined at bedside  She is awake and attempts to verbalize  Has right gaze preference and left-sided weakness  Afebrile    Objective:    Vitals: Blood pressure (!) 177/79, pulse 95, temperature 98 5 °F (36 9 °C), resp  rate 19, height 5' 4" (1 626 m), weight 61 kg (134 lb 7 7 oz), SpO2 93 %, not currently breastfeeding  ,Body mass index is 23 08 kg/m²    SPO2 RA Rest    Flowsheet Row ED to Hosp-Admission (Current) from 6/3/2022 in [address] Med Surg Unit   SpO2 93 %   SpO2 Activity At Rest   O2 Device None (Room air)   O2 Flow Rate --        I&O:     Intake/Output Summary (Last 24 hours) at 6/8/2022 1235  Last data filed at 6/8/2022 1123  Gross per 24 hour   Intake --   Output 1375 ml   Net -1375 ml       Physical Exam:    General- lying comfortably in bed  Not in any acute distress  Neck- Supple, No JVD  CVS- regular, S1 and S2 normal  Chest- Bilateral Air entry, No rhochi, crackles or wheezing present  Abdomen- soft, nontender, not distended, no guarding or rigidity, BS+  Extremities-  No pedal edema, No calf tenderness  CNS-   Awake  Left-sided weakness    Dysarthria    Invasive Devices  Report    Peripheral Intravenous Line  Duration           Peripheral IV 06/05/22 Right;Ventral (anterior) Forearm 3 days    Peripheral IV 06/06/22 Left Antecubital 2 days          Drain  Duration           Urethral Catheter Temperature probe 4 days                      Social History  reviewed  Family History   Problem Relation Age of Onset    No Known Problems Mother     No Known Problems Father     reviewed    Meds:  Current Facility-Administered Medications   Medication Dose Route Frequency Provider Last Rate Last Admin    albuterol (PROVENTIL HFA,VENTOLIN HFA) inhaler 2 puff  2 puff Inhalation Q4H PRN Yee Fly, DO        aluminum-magnesium hydroxide-simethicone (MYLANTA) oral suspension 30 mL  30 mL Oral Q6H PRN Yee Fly, DO        brinzolamide (AZOPT) 1 % ophthalmic suspension 1 drop  1 drop Both Eyes HS Yee Fly, DO   1 drop at 06/07/22 2223    cefTRIAXone (ROCEPHIN) IVPB (premix in dextrose) 1,000 mg 50 mL  1,000 mg Intravenous Daily Yee Fly,  mL/hr at 06/07/22 1351 1,000 mg at 06/07/22 1351    dextrose 5 % and sodium chloride 0 9 % infusion  75 mL/hr Intravenous Continuous Azalea Richard MD        nystatin (MYCOSTATIN) cream   Topical BID [place], DO   Given at 06/08/22 0943    ondansetron (ZOFRAN) injection 4 mg  4 mg Intravenous Q6H PRN Yee Fly, DO        potassium chloride 40 mEq IVPB (premix)  40 mEq Intravenous Once Yee Mir MD        sodium chloride (PF) 0 9 % injection 3 mL  3 mL Intravenous Q8H [facility] & half-way Yee Aldrich DO   3 mL at 06/08/22 0445      Medications Prior to Admission   Medication    albuterol (PROAIR HFA) 90 mcg/act inhaler    brinzolamide (AZOPT) 1 % ophthalmic suspension    losartan (COZAAR) 100 MG tablet    metoprolol succinate (TOPROL-XL) 100 mg 24 hr tablet    nystatin (MYCOSTATIN) cream    omeprazole (PriLOSEC) 20 mg delayed release capsule    PARoxetine (PAXIL) 30 mg tablet       Labs:  Results from last 7 days   Lab Units 06/08/22  0330 06/07/22  1315 06/06/22  1955 06/06/22  0935 06/03/22 2125 06/03/22  1311   WBC Thousand/uL 9 34 11 30*  --  11 86*   < > 9 25   HEMOGLOBIN g/dL 7 8* 8 6* 8 0* 8 7*   < > 10 7*   I STAT HEMOGLOBIN   --   --   --   --    < >  --    HEMATOCRIT % 24 1* 26 6* 24 7* 26 2*   < > 34 3*   HEMATOCRIT, ISTAT   --   --   --   --    < >  --    PLATELETS Thousands/uL 227 230  --  191   < > 330   NEUTROS PCT % 77* 79*  --   --   --  59   LYMPHS PCT % 12* 14  --   --   --  34   MONOS PCT % 9 7  --   --   --  5   EOS PCT % 1 0  --   --   --  2    < > = values in this interval not displayed       Results from last 7 days   Lab Units 06/08/22  0330 06/07/22  1315 06/06/22  0935 06/06/22  0929 06/06/22  0336 06/05/22  0447 06/04/22  0254   POTASSIUM mmol/L 3 2* 3 5 4 1  --  4 0 3 4* 4 1   CHLORIDE mmol/L 114* 114* 113*  --  113* 115* 110*   CO2 mmol/L 25 22 23  --  24 23 23   CO2, I-STAT mmol/L  --   --   --  22  --   --   --    BUN mg/dL 14 16 23  --  25 19 20   CREATININE mg/dL 0 62 0 69 0 80  --  0 80 0 81 0 77   CALCIUM mg/dL 8 8 9 1 8 6  --  8 4 8 3 8 1*   ALK PHOS U/L  --   --   --   --  48 54 53   ALT U/L  --   --   --   --  10* 9* 10*   AST U/L  --   --   --   --  19 24 16   GLUCOSE, ISTAT mg/dl  --   --   --  121  --   --   --      Lab Results   Component Value Date    TROPONINI <0 02 12/08/2019    TROPONINI <0 02 11/17/2019 TROPONINI <0 02 11/12/2019     Results from last 7 days   Lab Units 06/06/22  0935 06/03/22  1311   INR  1 15 1 06     Lab Results   Component Value Date    BLOODCX No Growth at 48 hrs  06/05/2022    BLOODCX No Growth at 48 hrs  06/05/2022    BLOODCX No Growth After 4 Days  06/03/2022    BLOODCX Staphylococcus coagulase negative (A) 06/03/2022    URINECX >100,000 cfu/ml Escherichia coli (A) 06/03/2022    URINECX 40,000-49,000 cfu/ml  06/03/2022    URINECX >100,000 cfu/ml Escherichia coli (A) 12/08/2019    URINECX 60,000-69,000 cfu/ml  12/08/2019    SPUTUMCULTUR 3+ Growth of  11/17/2019    SPUTUMCULTUR  11/17/2019     Commensal respiratory mery only; No significant growth of Staph aureus/MRSA or Pseudomonas aeruginosa  Imaging:  No results found for this or any previous visit  Results for orders placed during the hospital encounter of 12/08/19    XR chest 2 views    Narrative  CHEST    INDICATION:   leukocytosis, HTN     COMPARISON:  11/17/2019    EXAM PERFORMED/VIEWS:  XR CHEST PA & LATERAL  Images: 2    FINDINGS:    Cardiomediastinal silhouette appears enlarged  The pulmonary vessels are normal     Previously described parenchymal density at the left lung base has resolved  There are no infiltrates  No pneumothorax or pleural effusion  S-shaped scoliosis  Impression  No acute cardiopulmonary disease  Cardiomegaly          Workstation performed: [id number]      Last 24 Hours Medication List:   Current Facility-Administered Medications   Medication Dose Route Frequency Provider Last Rate    albuterol  2 puff Inhalation Q4H PRN Yee Fly, DO      aluminum-magnesium hydroxide-simethicone  30 mL Oral Q6H PRN Yee Fly, DO      brinzolamide  1 drop Both Eyes HS Yee Fly, DO      cefTRIAXone  1,000 mg Intravenous Daily Yee Fly, DO 1,000 mg (06/07/22 1351)    dextrose 5 % and sodium chloride 0 9 %  75 mL/hr Intravenous Continuous Azalea Richard MD      nystatin   Topical BID Lazarus Eth, DO      ondansetron  4 mg Intravenous Q6H PRN Yee Fly, DO      potassium chloride  40 mEq Intravenous Once Afshan Mckenzie MD      sodium chloride (PF)  3 mL Intravenous Q8H Kwaku Davenport DO          Today, Patient Was Seen By: Afshan Mckenzie MD    ** Please Note: Dictation voice to text software may have been used in the creation of this document   **

## 2022-06-08 NOTE — PHYSICAL THERAPY NOTE
Physical Therapy Cancellation Note           06/08/22 1107   PT Last Visit   PT Visit Date 06/08/22   Note Type   Note Type Cancelled Session;  PT/OT attempted treatment;  pt unable to wake, does not react to sternal rub, B heel cord stretch (no clonus noted), B hamstring stretch;  not appropriate for PT as pt unable to participate;  will follow up as schedule allows and pt appropriate  Nurse aware       Roslyn Pineda, PT

## 2022-06-08 NOTE — ASSESSMENT & PLAN NOTE
Most likely diverticular bleed source-  GI input appreciated--Dr BURGESS [facility] following  Status post packed red cell transfusion  Hemoglobin is 7 8  On Protonix  Continue to trend

## 2022-06-08 NOTE — OCCUPATIONAL THERAPY NOTE
Occupational Therapy Cx Note     Patient Name: Atul Méndez  NOCPL'V Date: 6/8/2022  Problem List  Principal Problem:    Sepsis ([facility] )  Active Problems:    Mild intermittent asthma without complication    Gastroesophageal reflux disease without esophagitis    Encephalopathy, metabolic    Cystitis    Acute diverticulitis    Rectal bleeding    Acute blood loss anemia    CVA (cerebral vascular accident) ([facility] )    Unresponsive episode          06/08/22 1143   OT Last Visit   OT Visit Date 06/08/22   Note Type   Note type Cancelled Session   Additional Comments Attempted to see pt for OT treatment session  Pt asleep and difficult to arouse  Unable to awake pt despite sternal rubs and use of wet washcloth on face  Will hold session and follow at later time/date as able             Char Trent OTR/L

## 2022-06-08 NOTE — ASSESSMENT & PLAN NOTE
More lethargic on day prior to admission and then had an episode of unresponsiveness on a m   Of admission at assisted living facility  In setting of CVA and sepsis secondary to UTI  Neurology on board  Stroke workup ongoing  MRI brain is pending  EEG  results are reviewed

## 2022-06-08 NOTE — PLAN OF CARE
Problem: METABOLIC, FLUID AND ELECTROLYTES - ADULT  Goal: Electrolytes maintained within normal limits  Description: INTERVENTIONS:  - Monitor labs and assess patient for signs and symptoms of electrolyte imbalances  - Administer electrolyte replacement as ordered  - Monitor response to electrolyte replacements, including repeat lab results as appropriate  - Instruct patient on fluid and nutrition as appropriate  Outcome: Progressing  Goal: Fluid balance maintained  Description: INTERVENTIONS:  - Monitor labs   - Monitor I/O and WT  - Instruct patient on fluid and nutrition as appropriate  - Assess for signs & symptoms of volume excess or deficit  Outcome: Progressing  Goal: Glucose maintained within target range  Description: INTERVENTIONS:  - Monitor Blood Glucose as ordered  - Assess for signs and symptoms of hyperglycemia and hypoglycemia  - Administer ordered medications to maintain glucose within target range  - Assess nutritional intake and initiate nutrition service referral as needed  Outcome: Progressing     Problem: HEMATOLOGIC - ADULT  Goal: Maintains hematologic stability  Description: INTERVENTIONS  - Assess for signs and symptoms of bleeding or hemorrhage  - Monitor labs  - Administer supportive blood products/factors as ordered and appropriate  Outcome: Progressing     Problem: METABOLIC, FLUID AND ELECTROLYTES - ADULT  Goal: Electrolytes maintained within normal limits  Description: INTERVENTIONS:  - Monitor labs and assess patient for signs and symptoms of electrolyte imbalances  - Administer electrolyte replacement as ordered  - Monitor response to electrolyte replacements, including repeat lab results as appropriate  - Instruct patient on fluid and nutrition as appropriate  Outcome: Progressing  Goal: Fluid balance maintained  Description: INTERVENTIONS:  - Monitor labs   - Monitor I/O and WT  - Instruct patient on fluid and nutrition as appropriate  - Assess for signs & symptoms of volume excess or deficit  Outcome: Progressing  Goal: Glucose maintained within target range  Description: INTERVENTIONS:  - Monitor Blood Glucose as ordered  - Assess for signs and symptoms of hyperglycemia and hypoglycemia  - Administer ordered medications to maintain glucose within target range  - Assess nutritional intake and initiate nutrition service referral as needed  Outcome: Progressing     Problem: GENITOURINARY - ADULT  Goal: Maintains or returns to baseline urinary function  Description: INTERVENTIONS:  - Assess urinary function  - Encourage oral fluids to ensure adequate hydration if ordered  - Administer IV fluids as ordered to ensure adequate hydration  - Administer ordered medications as needed  - Offer frequent toileting  - Follow urinary retention protocol if ordered  Outcome: Progressing  Goal: Absence of urinary retention  Description: INTERVENTIONS:  - Assess patients ability to void and empty bladder  - Monitor I/O  - Bladder scan as needed  - Discuss with physician/AP medications to alleviate retention as needed  - Discuss catheterization for long term situations as appropriate  Outcome: Progressing  Goal: Urinary catheter remains patent  Description: INTERVENTIONS:  - Assess patency of urinary catheter  - If patient has a chronic edwards, consider changing catheter if non-functioning  - Follow guidelines for intermittent irrigation of non-functioning urinary catheter  Outcome: Progressing     Problem: GASTROINTESTINAL - ADULT  Goal: Minimal or absence of nausea and/or vomiting  Description: INTERVENTIONS:  - Administer IV fluids if ordered to ensure adequate hydration  - Maintain NPO status until nausea and vomiting are resolved  - Nasogastric tube if ordered  - Administer ordered antiemetic medications as needed  - Provide nonpharmacologic comfort measures as appropriate  - Advance diet as tolerated, if ordered  - Consider nutrition services referral to assist patient with adequate nutrition and appropriate food choices  Outcome: Progressing  Goal: Maintains or returns to baseline bowel function  Description: INTERVENTIONS:  - Assess bowel function  - Encourage oral fluids to ensure adequate hydration  - Administer IV fluids if ordered to ensure adequate hydration  - Administer ordered medications as needed  - Encourage mobilization and activity  - Consider nutritional services referral to assist patient with adequate nutrition and appropriate food choices  Outcome: Progressing  Goal: Maintains adequate nutritional intake  Description: INTERVENTIONS:  - Monitor percentage of each meal consumed  - Identify factors contributing to decreased intake, treat as appropriate  - Assist with meals as needed  - Monitor I&O, weight, and lab values if indicated  - Obtain nutrition services referral as needed  Outcome: Progressing  Goal: Establish and maintain optimal ostomy function  Description: INTERVENTIONS:  - Assess bowel function  - Encourage oral fluids to ensure adequate hydration  - Administer IV fluids if ordered to ensure adequate hydration   - Administer ordered medications as needed  - Encourage mobilization and activity  - Nutrition services referral to assist patient with appropriate food choices  - Assess stoma site  - Consider wound care consult   Outcome: Progressing  Goal: Oral mucous membranes remain intact  Description: INTERVENTIONS  - Assess oral mucosa and hygiene practices  - Implement preventative oral hygiene regimen  - Implement oral medicated treatments as ordered  - Initiate Nutrition services referral as needed  Outcome: Progressing

## 2022-06-08 NOTE — ASSESSMENT & PLAN NOTE
Gi consult appreciated  Status post packed red cell transfusion  Hemoglobin last evening is 7 8  No active bleeding overnight  On Protonix  Hope to avoid endoscopy if possible per GI

## 2022-06-08 NOTE — PROGRESS NOTES
Progress note - Gastroenterology   Mark Daley 80 y o  female MRN: [id number]  Unit/Bed#: -01 Encounter: [id number]    ASSESSMENT and PLAN    1  Acute anemia, with now 2nd episode of rectal bleeding   CT abdomen and pelvis negative except for diverticulosis   CT head negative     Check iron panels and give IV iron p r n  Yadi Dinero   episode of maroon stool 6/4/22 am   Small BM 6/5/22, but not reportedly bloody      Appears this is likely secondary to a diverticular bleed   We have bee treating with supportive care      Drop in Hgb to 7 8  Discussed with Nursing  No overt signs of GI bleed      - trend H&H and transfuse to hemoglobin greater than 8  - okay to do PPI 40 mg daily empirically  - iron 32   Sat 63%, but TIBC very low  Ferritin 59   - given other comorbidities, advanced age, would defer on any endoscopic evaluation at this point, however if hemoglobin continues to drop and/or there is evidence of brisk bleeding, may need to consider prepping and colonoscopy vs IR based therapy      -rapid response/ stroke alert called  6/6 increased    MRI showed acute CVA- neurology on board  If hemoglobin remains stable with no further bleeding which it has been, okay to start aspirin or Plavix  Agree with not doing full anticoagulation   -given further drop in hemoglobin, would ideally like to hold off another day or so on plavix and continue monitoring  Patient is unable to take any p o  At this time anyways      Chief Complaint   Patient presents with    Altered Mental Status       SUBJECTIVE/HPI   Resting comfortably in bed    BP (!) 177/79   Pulse 95   Temp 98 5 °F (36 9 °C)   Resp 19   Ht 5' 4" (1 626 m)   Wt 61 kg (134 lb 7 7 oz)   SpO2 93%   BMI 23 08 kg/m²     PHYSICALEXAM  General appearance: alert, appears stated age and cooperative  Eyes: PERLLA, EOMI, no icterus   Head: Normocephalic, without obvious abnormality, atraumatic  Lungs: clear to auscultation bilaterally  Heart: regular rate and rhythm, S1, S2 normal, no murmur, click, rub or gallop  Abdomen: soft, non-tender; bowel sounds normal; no masses,  no organomegaly  Extremities: extremities normal, atraumatic, no cyanosis or edema  Neurologic: Grossly normal    Lab Results   Component Value Date    GLUCOSE 121 06/06/2022    CALCIUM 8 8 06/08/2022     (L) 06/03/2015    K 3 2 (L) 06/08/2022    CO2 25 06/08/2022     (H) 06/08/2022    BUN 14 06/08/2022    CREATININE 0 62 06/08/2022     Lab Results   Component Value Date    WBC 9 34 06/08/2022    HGB 7 8 (L) 06/08/2022    HCT 24 1 (L) 06/08/2022    MCV 94 06/08/2022     06/08/2022     Lab Results   Component Value Date    ALT 10 (L) 06/06/2022    AST 19 06/06/2022    ALKPHOS 48 06/06/2022    BILITOT 0 4 06/03/2015     No results found for: AMYLASE  Lab Results   Component Value Date    LIPASE 62 (L) 06/03/2022     Lab Results   Component Value Date    IRON 63 06/04/2022    TIBC 198 (L) 06/04/2022    FERRITIN 64 06/04/2022     Lab Results   Component Value Date    INR 1 15 06/06/2022

## 2022-06-08 NOTE — PLAN OF CARE
Problem: Potential for Falls  Goal: Patient will remain free of falls  Description: INTERVENTIONS:  - Educate patient/family on patient safety including physical limitations  - Instruct patient to call for assistance with activity   - Consult OT/PT to assist with strengthening/mobility   - Keep Call bell within reach  - Keep bed low and locked with side rails adjusted as appropriate  - Keep care items and personal belongings within reach  - Initiate and maintain comfort rounds  - Make Fall Risk Sign visible to staff  - Offer Toileting every 2 Hours, in advance of need  - Initiate/Maintain bed alarm  - Obtain necessary fall risk management equipment:   - Apply yellow socks and bracelet for high fall risk patients  - Consider moving patient to room near nurses station  Outcome: Progressing     Problem: GASTROINTESTINAL - ADULT  Goal: Minimal or absence of nausea and/or vomiting  Description: INTERVENTIONS:  - Administer IV fluids if ordered to ensure adequate hydration  - Maintain NPO status until nausea and vomiting are resolved  - Nasogastric tube if ordered  - Administer ordered antiemetic medications as needed  - Provide nonpharmacologic comfort measures as appropriate  - Advance diet as tolerated, if ordered  - Consider nutrition services referral to assist patient with adequate nutrition and appropriate food choices  Outcome: Progressing  Goal: Maintains or returns to baseline bowel function  Description: INTERVENTIONS:  - Assess bowel function  - Encourage oral fluids to ensure adequate hydration  - Administer IV fluids if ordered to ensure adequate hydration  - Administer ordered medications as needed  - Encourage mobilization and activity  - Consider nutritional services referral to assist patient with adequate nutrition and appropriate food choices  Outcome: Progressing  Goal: Maintains adequate nutritional intake  Description: INTERVENTIONS:  - Monitor percentage of each meal consumed  - Identify factors contributing to decreased intake, treat as appropriate  - Assist with meals as needed  - Monitor I&O, weight, and lab values if indicated  - Obtain nutrition services referral as needed  Outcome: Progressing  Goal: Establish and maintain optimal ostomy function  Description: INTERVENTIONS:  - Assess bowel function  - Encourage oral fluids to ensure adequate hydration  - Administer IV fluids if ordered to ensure adequate hydration   - Administer ordered medications as needed  - Encourage mobilization and activity  - Nutrition services referral to assist patient with appropriate food choices  - Assess stoma site  - Consider wound care consult   Outcome: Progressing  Goal: Oral mucous membranes remain intact  Description: INTERVENTIONS  - Assess oral mucosa and hygiene practices  - Implement preventative oral hygiene regimen  - Implement oral medicated treatments as ordered  - Initiate Nutrition services referral as needed  Outcome: Progressing     Problem: GENITOURINARY - ADULT  Goal: Maintains or returns to baseline urinary function  Description: INTERVENTIONS:  - Assess urinary function  - Encourage oral fluids to ensure adequate hydration if ordered  - Administer IV fluids as ordered to ensure adequate hydration  - Administer ordered medications as needed  - Offer frequent toileting  - Follow urinary retention protocol if ordered  Outcome: Progressing  Goal: Absence of urinary retention  Description: INTERVENTIONS:  - Assess patients ability to void and empty bladder  - Monitor I/O  - Bladder scan as needed  - Discuss with physician/AP medications to alleviate retention as needed  - Discuss catheterization for long term situations as appropriate  Outcome: Progressing  Goal: Urinary catheter remains patent  Description: INTERVENTIONS:  - Assess patency of urinary catheter  - If patient has a chronic edwards, consider changing catheter if non-functioning  - Follow guidelines for intermittent irrigation of non-functioning urinary catheter  Outcome: Progressing     Problem: METABOLIC, FLUID AND ELECTROLYTES - ADULT  Goal: Electrolytes maintained within normal limits  Description: INTERVENTIONS:  - Monitor labs and assess patient for signs and symptoms of electrolyte imbalances  - Administer electrolyte replacement as ordered  - Monitor response to electrolyte replacements, including repeat lab results as appropriate  - Instruct patient on fluid and nutrition as appropriate  Outcome: Progressing  Goal: Fluid balance maintained  Description: INTERVENTIONS:  - Monitor labs   - Monitor I/O and WT  - Instruct patient on fluid and nutrition as appropriate  - Assess for signs & symptoms of volume excess or deficit  Outcome: Progressing  Goal: Glucose maintained within target range  Description: INTERVENTIONS:  - Monitor Blood Glucose as ordered  - Assess for signs and symptoms of hyperglycemia and hypoglycemia  - Administer ordered medications to maintain glucose within target range  - Assess nutritional intake and initiate nutrition service referral as needed  Outcome: Progressing     Problem: SKIN/TISSUE INTEGRITY - ADULT  Goal: Skin Integrity remains intact(Skin Breakdown Prevention)  Description: Assess:  -Perform Miguel assessment every shift  -Clean and moisturize skin every shift  -Inspect skin when repositioning, toileting, and assisting with ADLS  -Assess under medical devices such as  every   -Assess extremities for adequate circulation and sensation     Bed Management:  -Have minimal linens on bed & keep smooth, unwrinkled  -Change linens as needed when moist or perspiring  -Avoid sitting or lying in one position for more than 2 hours while in bed  -Keep HOB at 30 degrees     Toileting:  -Offer bedside commode  -Assess for incontinence every shift  -Use incontinent care products after each incontinent episode such as     Activity:  -Mobilize patient 2 times a day  -Encourage activity and walks on unit  -Encourage or provide ROM exercises   -Turn and reposition patient every 2 Hours  -Use appropriate equipment to lift or move patient in bed  -Instruct/ Assist with weight shifting every shift when out of bed in chair  -Consider limitation of chair time 2 hour intervals    Skin Care:  -Avoid use of baby powder, tape, friction and shearing, hot water or constrictive clothing  -Relieve pressure over bony prominences using   -Do not massage red bony areas    Next Steps:  -Teach patient strategies to minimize risks such as    -Consider consults to  interdisciplinary teams such as   Outcome: Progressing  Goal: Incision(s), wounds(s) or drain site(s) healing without S/S of infection  Description: INTERVENTIONS  - Assess and document dressing, incision, wound bed, drain sites and surrounding tissue  - Provide patient and family education  - Perform skin care/dressing changes every shift  Outcome: Progressing  Goal: Pressure injury heals and does not worsen  Description: Interventions:  - Implement low air loss mattress or specialty surface (Criteria met)  - Apply silicone foam dressing  - Instruct/assist with weight shifting every 30 minutes when in chair   - Limit chair time to 2 hour intervals  - Use special pressure reducing interventions such as  when in chair   - Apply fecal or urinary incontinence containment device   - Perform passive or active ROM every   - Turn and reposition patient & offload bony prominences every 2 hours   - Utilize friction reducing device or surface for transfers   - Consider consults to  interdisciplinary teams such as   - Use incontinent care products after each incontinent episode such as   - Consider nutrition services referral as needed  Outcome: Progressing     Problem: MUSCULOSKELETAL - ADULT  Goal: Maintain or return mobility to safest level of function  Description: INTERVENTIONS:  - Assess patient's ability to carry out ADLs; assess patient's baseline for ADL function and identify physical deficits which impact ability to perform ADLs (bathing, care of mouth/teeth, toileting, grooming, dressing, etc )  - Assess/evaluate cause of self-care deficits   - Assess range of motion  - Assess patient's mobility  - Assess patient's need for assistive devices and provide as appropriate  - Encourage maximum independence but intervene and supervise when necessary  - Involve family in performance of ADLs  - Assess for home care needs following discharge   - Consider OT consult to assist with ADL evaluation and planning for discharge  - Provide patient education as appropriate  Outcome: Progressing  Goal: Maintain proper alignment of affected body part  Description: INTERVENTIONS:  - Support, maintain and protect limb and body alignment  - Provide patient/ family with appropriate education  Outcome: Progressing     Problem: MOBILITY - ADULT  Goal: Maintain or return to baseline ADL function  Description: INTERVENTIONS:  -  Assess patient's ability to carry out ADLs; assess patient's baseline for ADL function and identify physical deficits which impact ability to perform ADLs (bathing, care of mouth/teeth, toileting, grooming, dressing, etc )  - Assess/evaluate cause of self-care deficits   - Assess range of motion  - Assess patient's mobility; develop plan if impaired  - Assess patient's need for assistive devices and provide as appropriate  - Encourage maximum independence but intervene and supervise when necessary  - Involve family in performance of ADLs  - Assess for home care needs following discharge   - Consider OT consult to assist with ADL evaluation and planning for discharge  - Provide patient education as appropriate  Outcome: Progressing  Goal: Maintains/Returns to pre admission functional level  Description: INTERVENTIONS:  - Perform BMAT or MOVE assessment daily    - Set and communicate daily mobility goal to care team and patient/family/caregiver     - Collaborate with rehabilitation services on mobility goals if consulted  - Perform Range of Motion 2 times a day  - Reposition patient every 2 hours    - Dangle patient 2 times a day  - Stand patient 2 times a day  - Ambulate patient 2 times a day  - Out of bed to chair 2 times a day   - Out of bed for meals 2 times a day  - Out of bed for toileting  - Record patient progress and toleration of activity level   Outcome: Progressing     Problem: Prexisting or High Potential for Compromised Skin Integrity  Goal: Skin integrity is maintained or improved  Description: INTERVENTIONS:  - Identify patients at risk for skin breakdown  - Assess and monitor skin integrity  - Assess and monitor nutrition and hydration status  - Monitor labs   - Assess for incontinence   - Turn and reposition patient  - Assist with mobility/ambulation  - Relieve pressure over bony prominences  - Avoid friction and shearing  - Provide appropriate hygiene as needed including keeping skin clean and dry  - Evaluate need for skin moisturizer/barrier cream  - Collaborate with interdisciplinary team   - Patient/family teaching  - Consider wound care consult   Outcome: Progressing     Problem: HEMATOLOGIC - ADULT  Goal: Maintains hematologic stability  Description: INTERVENTIONS  - Assess for signs and symptoms of bleeding or hemorrhage  - Monitor labs  - Administer supportive blood products/factors as ordered and appropriate  Outcome: Progressing

## 2022-06-08 NOTE — ASSESSMENT & PLAN NOTE
Patient diagnosed with CVA  On stroke pathway  Neurology consult appreciated  CT head and CTA-showed New right BRITTNEY distribution infarct in the paramedian right posterior frontal/parietal lobes  On telemetry  Hold Toprol-XL  P T /OT/ST  HB A1c and lipid panel reviewed  Hold aspirin and Plavix in setting of GI bleed  As per GI hemoglobin is stable can start on Plavix  Speech and swallow evaluation as patient is NPO  MRI brain pending  EEG results reviewed  [facility]  [facility]  Patient was seen by speech specialist and they recommend continue NPO  Discussed with daughter at length and she feels that family would not want to go ahead with tube feeding    Discussed with her and she agreed for palliative care consult

## 2022-06-08 NOTE — NUTRITION
Pt currently NPOx 2 s/p acute stroke 6/6/22  Noted ST eval 6/8 recs NPO status  Recommendations:   IF within [address] and family agreeable, start TF    TF Goal rate is Jevity 1 2 @ 55 ml/hr  160 ml water flush q 6 hrs  Start TF Jevity 1 2 @ 15 ml/hr  Increase 10 ml/ q 8 hrs until at goal rate  TF Provides 1584 kcal, 73 gm protein and 1705 ml total free fluids

## 2022-06-08 NOTE — SPEECH THERAPY NOTE
Speech Language/Pathology    Speech/Language Pathology Progress Note    Patient Name: Avis Barone  SDMSF'N Date: 6/8/2022     Problem List  Principal Problem:    [facility] )  Active Problems:    Mild intermittent asthma without complication    Gastroesophageal reflux disease without esophagitis    Encephalopathy, metabolic    Cystitis    Acute diverticulitis    Rectal bleeding    Acute blood loss anemia    CVA (cerebral vascular accident) ([facility] )    Unresponsive episode       Past Medical History  Past Medical History:   Diagnosis Date    A-fib [facility])     Gastrointestinal hemorrhage associated with intestinal diverticulitis     Osteoarthritis     UTI (urinary tract infection)         Past Surgical History  History reviewed  No pertinent surgical history  Subjective:  Pt asleep upon arrival, alerts w/ stimuli  R gaze preference  Current Diet:  NPO    Objective:  Pt seen for dx dysphagia tx f/u  Thorough oral care provided prior to trials  Pt seen w/ trials puree and honey thick liquids  Pt w/ weak labial seal for retrieval, needing maximal assistance to get into oral cavity  Pt w/ initial oral holding for approx 1 minute prior to attempting to seal lips to manipulate material  Pt w/ weak lingual pumping to manipulate puree  Eventual transfer noted, weak laryngeal movement is palpated  Pt w/ subsequent 3-4 swallows of primary bolus  Wet cough response x2 trials  Pt took subsequent 2 tsps of puree without cough response, then noted w/ severe oral holding and absent transfer requiring suction of all material  Pt w/ poor, at times absent manipulate of liquids  Anterior spill of liquid material though suspect some gravity spill, laryngeal movement is palpated  Inconsistent increased WOB, drop in SPO2, and audible congestion w/ honey thick liquid today       Assessment:  Pt w/ severe oral dysphagia impairing ability to functionally and consistently transfer PO, noted s/s w/ material today - not appropriate for PO diet  Plan/Recommendations:  NPO, ?  Short-term alt means nutrition if within GOC/POC  Frequent and thorough oral care  ST f/u as able and appropriate

## 2022-06-09 LAB
ANION GAP SERPL CALCULATED.3IONS-SCNC: 9 MMOL/L (ref 4–13)
BACTERIA BLD CULT: NORMAL
BASOPHILS # BLD AUTO: 0.01 THOUSANDS/ΜL (ref 0–0.1)
BASOPHILS NFR BLD AUTO: 0 % (ref 0–1)
BUN SERPL-MCNC: 13 MG/DL (ref 5–25)
CALCIUM SERPL-MCNC: 9 MG/DL (ref 8.3–10.1)
CHLORIDE SERPL-SCNC: 111 MMOL/L (ref 100–108)
CO2 SERPL-SCNC: 24 MMOL/L (ref 21–32)
CREAT SERPL-MCNC: 0.55 MG/DL (ref 0.6–1.3)
EOSINOPHIL # BLD AUTO: 0.02 THOUSAND/ΜL (ref 0–0.61)
EOSINOPHIL NFR BLD AUTO: 0 % (ref 0–6)
ERYTHROCYTE [DISTWIDTH] IN BLOOD BY AUTOMATED COUNT: 17.6 % (ref 11.6–15.1)
GFR SERPL CREATININE-BSD FRML MDRD: 77 ML/MIN/1.73SQ M
GLUCOSE SERPL-MCNC: 154 MG/DL (ref 65–140)
HCT VFR BLD AUTO: 26.6 % (ref 34.8–46.1)
HGB BLD-MCNC: 8.2 G/DL (ref 11.5–15.4)
IMM GRANULOCYTES # BLD AUTO: 0.05 THOUSAND/UL (ref 0–0.2)
IMM GRANULOCYTES NFR BLD AUTO: 0 % (ref 0–2)
LYMPHOCYTES # BLD AUTO: 0.57 THOUSANDS/ΜL (ref 0.6–4.47)
LYMPHOCYTES NFR BLD AUTO: 5 % (ref 14–44)
MCH RBC QN AUTO: 29.4 PG (ref 26.8–34.3)
MCHC RBC AUTO-ENTMCNC: 30.8 G/DL (ref 31.4–37.4)
MCV RBC AUTO: 95 FL (ref 82–98)
MONOCYTES # BLD AUTO: 0.7 THOUSAND/ΜL (ref 0.17–1.22)
MONOCYTES NFR BLD AUTO: 6 % (ref 4–12)
NEUTROPHILS # BLD AUTO: 11.14 THOUSANDS/ΜL (ref 1.85–7.62)
NEUTS SEG NFR BLD AUTO: 89 % (ref 43–75)
NRBC BLD AUTO-RTO: 0 /100 WBCS
PLATELET # BLD AUTO: 273 THOUSANDS/UL (ref 149–390)
PMV BLD AUTO: 10.1 FL (ref 8.9–12.7)
POTASSIUM SERPL-SCNC: 3.3 MMOL/L (ref 3.5–5.3)
RBC # BLD AUTO: 2.79 MILLION/UL (ref 3.81–5.12)
SODIUM SERPL-SCNC: 144 MMOL/L (ref 136–145)
WBC # BLD AUTO: 12.49 THOUSAND/UL (ref 4.31–10.16)

## 2022-06-09 PROCEDURE — 97110 THERAPEUTIC EXERCISES: CPT

## 2022-06-09 PROCEDURE — 80048 BASIC METABOLIC PNL TOTAL CA: CPT | Performed by: INTERNAL MEDICINE

## 2022-06-09 PROCEDURE — 99233 SBSQ HOSP IP/OBS HIGH 50: CPT | Performed by: PHYSICIAN ASSISTANT

## 2022-06-09 PROCEDURE — 99232 SBSQ HOSP IP/OBS MODERATE 35: CPT | Performed by: INTERNAL MEDICINE

## 2022-06-09 PROCEDURE — 85025 COMPLETE CBC W/AUTO DIFF WBC: CPT | Performed by: INTERNAL MEDICINE

## 2022-06-09 PROCEDURE — 92526 ORAL FUNCTION THERAPY: CPT

## 2022-06-09 PROCEDURE — 99232 SBSQ HOSP IP/OBS MODERATE 35: CPT | Performed by: PHYSICIAN ASSISTANT

## 2022-06-09 PROCEDURE — 97530 THERAPEUTIC ACTIVITIES: CPT

## 2022-06-09 RX ORDER — CLOPIDOGREL BISULFATE 75 MG/1
75 TABLET ORAL DAILY
Status: DISCONTINUED | OUTPATIENT
Start: 2022-06-09 | End: 2022-06-11 | Stop reason: HOSPADM

## 2022-06-09 RX ORDER — ATORVASTATIN CALCIUM 40 MG/1
40 TABLET, FILM COATED ORAL
Status: DISCONTINUED | OUTPATIENT
Start: 2022-06-09 | End: 2022-06-11 | Stop reason: HOSPADM

## 2022-06-09 RX ORDER — POTASSIUM CHLORIDE 14.9 MG/ML
20 INJECTION INTRAVENOUS ONCE
Status: COMPLETED | OUTPATIENT
Start: 2022-06-09 | End: 2022-06-09

## 2022-06-09 RX ADMIN — NYSTATIN: 100000 CREAM TOPICAL at 08:27

## 2022-06-09 RX ADMIN — CLOPIDOGREL BISULFATE 75 MG: 75 TABLET ORAL at 12:02

## 2022-06-09 RX ADMIN — DEXTROSE AND SODIUM CHLORIDE 75 ML/HR: 5; .9 INJECTION, SOLUTION INTRAVENOUS at 04:58

## 2022-06-09 RX ADMIN — CEFTRIAXONE 1000 MG: 1 INJECTION, SOLUTION INTRAVENOUS at 12:02

## 2022-06-09 RX ADMIN — NYSTATIN: 100000 CREAM TOPICAL at 18:28

## 2022-06-09 RX ADMIN — BRINZOLAMIDE 1 DROP: 10 SUSPENSION/ DROPS OPHTHALMIC at 21:06

## 2022-06-09 RX ADMIN — SODIUM CHLORIDE 3 ML: 9 INJECTION, SOLUTION INTRAMUSCULAR; INTRAVENOUS; SUBCUTANEOUS at 05:01

## 2022-06-09 RX ADMIN — POTASSIUM CHLORIDE 20 MEQ: 14.9 INJECTION, SOLUTION INTRAVENOUS at 12:03

## 2022-06-09 NOTE — OCCUPATIONAL THERAPY NOTE
Occupational Therapy Tx Note     Patient Name: Gela BAIRD Date: 6/9/2022  Problem List  Principal Problem:    Sepsis ([facility] )  Active Problems:    Mild intermittent asthma without complication    Gastroesophageal reflux disease without esophagitis    Encephalopathy, metabolic    Cystitis    Acute diverticulitis    Rectal bleeding    Acute blood loss anemia    CVA (cerebral vascular accident) ([facility] )    Unresponsive episode            06/09/22 1453   OT Last Visit   OT Visit Date 06/09/22   Note Type   Note Type Cancelled Session   Restrictions/Precautions   Weight Bearing Precautions Per Order No   Other Precautions Fall Risk;Cognitive; Bed Alarm;Visual impairment  (R visual gaze)   Pain Assessment   Pain Assessment Tool FLACC   Pain Rating: FLACC (Rest) - Face 0   Pain Rating: FLACC (Rest) - Legs 0   Pain Rating: FLACC (Rest) - Activity 0   Pain Rating: FLACC (Rest) - Cry 0   Pain Rating: FLACC (Rest) - Consolability 0   Score: FLACC (Rest) 0   Pain Rating: FLACC (Activity) - Face 0   Pain Rating: FLACC (Activity) - Legs 0   Pain Rating: FLACC (Activity) - Activity 0   Pain Rating: FLACC (Activity) - Cry 0   Pain Rating: FLACC (Activity) - Consolability 0   Score: FLACC (Activity) 0   ADL   Grooming Assistance 1  Total Assistance   Grooming Deficit Brushing hair;Wash/dry face   Bed Mobility   Supine to Sit Unable to assess   Therapeutic Exercise - ROM   UE-ROM Yes   ROM- Right Upper Extremities   R Shoulder PROM;AAROM   R Elbow PROM   R Wrist PROM   R Hand PROM;AAROM   R Position Supine   ROM - Left Upper Extremities    L Shoulder PROM   L Elbow PROM   L Wrist PROM   L Hand PROM   L Position Supine   LUE ROM Comment Noted inconsistent hand grasp with LUE 2-/5 strength   Cognition   Overall Cognitive Status Impaired   Arousal/Participation Arousable   Attention JOS   Orientation Level Unable to assess   Memory Unable to assess   Following Commands Follows one step commands inconsistently   Vision Vision Comments Pt able to make some eye contact today primarily in R visual field  With total A in turning head to L, able to momentarily make eye contact with this writer  Pt able to track stimulus in right visual field to midline independently  50% of time  Additional Activities   Additional Activities Comments Performed functional activity requiring pt to reach and grasp cup with right hand, cross midline, and place cup in OTS' hand  Required proximal support at right elbow, as well as hand over hand assistance to [facility] [place] grasp  Also required max A to manually assist in head turning to left  Pt able to follow step by step simple commands (i e  open hand, hold cup, open hand) in order to complete activity  Performed x 3 reps in R<>L directions  Activity Tolerance   Activity Tolerance   (Treatment limited by cognition)   Medical Staff Made Aware Joanna Lopez, RN Jalyn   Assessment   Assessment Pt seen for OT tx session with focus on therapeutic exercises/activities, visual remediation activities, and ADL status Pt received supine in bed  Chidi Hernandez at bedside and agreeable to have pt participate with therapy  Pt received in supine position  Pt asleep but arousable with gentle sternal rub  Pt participated in BUE therapeutic exercises  RUE notably swollen today, however pt able to participate in therapeutic exercises/activities  Pt able to make some eye contact today primarily in R visual field  With total A in turning head to L, able to momentarily make eye contact with this writer  Pt also able to perform activity requiring grasp/release of object, crossing midline, and scanning  Pt remains non-verbal  Patient continues to be functioning below baseline level, occupational performance remains limited secondary to factors listed above, and pt at increased risk for falls and injury  The patient's raw score on the AM-PAC Daily Activity inpatient short form is 6, standardized score is  , less than 39 4  Patients at this level are likely to benefit from DC to post-acute rehabilitation services  Please refer to the recommendation of the Occupational Therapist for safe DC planning  From OT standpoint, recommendation at time of d/c would be Short Term Rehab  Patient to benefit from continued Occupational Therapy treatment while in the hospital to address deficits as defined above and maximize level of functional independence with ADLs and functional mobility  Pt left with call bell in reach, tray table in reach, needs met, bed alarm activated, SCDs on  RN aware  Plan   Treatment Interventions ADL retraining;Visual perceptual retraining; Endurance training;UE strengthening/ROM; Functional transfer training;Cognitive reorientation; Neuromuscular reeducation; Fine motor coordination activities; Patient/family training;Equipment evaluation/education; Compensatory technique education   Goal Expiration Date 06/17/22   OT Treatment Day 1   OT Frequency 3-5x/wk   Recommendation   OT Discharge Recommendation Post acute rehabilitation services   AM-PAC Daily Activity Inpatient   Lower Body Dressing 1   Bathing 1   Toileting 1   Upper Body Dressing 1   Grooming 1   Eating 1   Daily Activity Raw Score 6   Turning Head Towards Sound 2   Follow Simple Instructions 2   Low Function Daily Activity Raw Score 10   Low Function Daily Activity Standardized Score 17 31   AM-PAC Applied Cognition Inpatient   Following a Speech/Presentation 1   Understanding Ordinary Conversation 1   Taking Medications 1   Remembering Where Things Are Placed or Put Away 1   Remembering List of 4-5 Errands 1   Taking Care of Complicated Tasks 1   Applied Cognition Raw Score 6   Applied Cognition Standardized Score 7 69           Kimberly Basurto OTR/L

## 2022-06-09 NOTE — PROGRESS NOTES
Progress Note - Neurology   Gabriela Daley 80 y o  female MRN: [id number]  Unit/Bed#: -01 Encounter: [id number]      Assessment/Plan   CVA (cerebral vascular accident) [facility])  Assessment & Plan  8 year old female with history of atrial fibrillation not on anticoagulation prior to admission, presenting initially on 06/03 from assisted living facility with GI bleeding concern and mental status changes (she had episode of unresponsiveness at the nursing facility prior to admission)  She has been undergoing workup for possible diverticular bleeding, GI bleeding, as well as UTI management  Stroke alert called on 6/6 for acute altered mental status; exam with right gaze deviation, right upper extremity tremor and left-sided weakness with stimulation/limited motor testing  Initial and follow-up neuro imaging with MRI brain confirms right BRITTNEY infarction as well as smaller left inferomedial frontal infarct as well  Concern for cardioembolic origin given AFib history and no AP/AC prior to admission  Plan:  -stroke workup completed:  -CT head during alert with right BRITTNEY territory infarct; no acute hemorrhage  -CTA head/neck with right A3 segment occlusion, no other critical stenosis nor large vessel occlusion elsewhere  -MRI brain with moderate/large right BRITTNEY infarct as well as smaller left frontal infarct, embolic origin  -2D echo with EF 65%, no regional wall motion abnormalities, normal atrial size bilaterally    -currently antiplatelet and anticoagulation is on hold due to GI bleed concern: Neurology discussed with family earlier this week: would be hesitant to initiate full anticoagulation for cardioembolic stroke protection given patient's age, bleed risk and ongoing GI bleed concern   -currently severe dysphagia risk: would recommend, if stable from GI/bleeding standpoint and if/when able to take PO meds safely, single antiplatelet therapy with Plavix 75 mg daily if family agreeable  -statin if/when able to take PO reliably  -hemoglobin A1c of 4 8, lipid panel with LDL of 86  -given the unresponsive event and possible concern for seizure, initially given Keppra 3 G load, will not continue any AED  -routine EEG negative for epileptiform discharges/electrographic seizures  -neuro checks  -telemetry monitoring  -provide stroke education to patient/family  -therapy evaluations (Speech therapy, not currently appropriate for PT/OT), remains severe dysphagia risk  -feel goals of care/hospice discussion is appropriate from neurology standpoint, palliative care consulted, will await input  -ongoing medical workup via primary team, GI:  -CBC/blood count monitoring  -holding on endoscopic evaluation at this time given age and other co-morbidities (unless recurrence of bleeding/clinical worsening)    No further inpatient neurologic workup, ok from neurology standpoint for discharge  Discussed plan of care with attending neurologist      Depending on goals of care discussion, may/may not require outpatient neurology team (stroke/neurovascular) follow up  Subjective:   Patient resting in bed, lethargic, severe dysphagia risk per ST eval this morning  Still non-verbal, no real command following  No acute changes overnight per note/chart review  Vitals: Blood pressure 162/71, pulse 85, temperature 98 3 °F (36 8 °C), resp  rate 18, height 5' 4" (1 626 m), weight 59 kg (130 lb 1 1 oz), SpO2 94 %, not currently breastfeeding  ,Body mass index is 22 33 kg/m²  Examined alongside Dr Moe Hoover  Physical Exam:  Physical Exam  Constitutional:       Appearance: She is ill-appearing  HENT:      Head: Normocephalic  Eyes:      Conjunctiva/sclera: Conjunctivae normal       Pupils: Pupils are equal, round, and reactive to light  Cardiovascular:      Rate and Rhythm: Normal rate and regular rhythm     Pulmonary:      Effort: Pulmonary effort is normal    Musculoskeletal:      Cervical back: Normal range of motion and neck supple  Skin:     General: Skin is warm and dry  Neurologic Exam     Mental Status   Initially sleeping, eyes open with verbal stimuli, no vocalizations  Minimal simple command following with EOM's, otherwise without command following  Cranial Nerves     CN III, IV, VI   Pupils are equal, round, and reactive to light  Gaze conjugate, R gaze preference, tracks fully to R, does not appear to cross midline to track left  L facial weakness     Motor Exam Minimal distal UE strength, R slightly brisker than L  No real LE movement observed with gentle plantar stimuli  Sensory Exam   Formal testing deferred for patient comfort, no obvious hemisensory deficit/neglect  Gait, Coordination, and Reflexes     Tremor   Resting tremor: absent  Intention tremor: absent    Reflexes   Right plantar: normal  Left plantar: equivocal  Right ankle clonus: absent  Left ankle clonus: absent  Cannot assess coordination/gait  Lab, Imaging and other studies:   MRI brain wo contrast   Final Result by Bibi Knapp MD (06/08 0510)      Dense right anterior cerebral artery distribution nonhemorrhagic ischemia in keeping with right A3 anterior cerebral artery occlusion identified on CT angiography of June 6, 2022  Tiny focus of acute ischemia in the posterior inferomedial left frontal lobe  Microangiopathic changes  Age-related volume loss  Punctate foci of hemosiderin related to remote prior basal ganglia and left temporal lobe hemorrhages  This examination was marked "immediate notification" in Epic in order to begin the standard process by which the radiology reading room liaison alerts the referring practitioner  Workstation performed: [id number]         CTA stroke alert (head/neck)   Final Result by Erasto Hill MD (06/06 3106)      Right A3 occlusion  No other large vessel occlusion   Multifocal intracranial stenosis     No significant stenosis of the cervical carotid or vertebral arteries  I personally communicated results of this study to [facility] on 10:28 AM                            Workstation performed: [id number]         CT stroke alert brain   Final Result by Ruiz Lubin MD (06/06 1045)      New right BRITTNEY distribution infarct in the paramedian right posterior frontal/parietal lobes  No acute hemorrhage or mass effect  I personally communicated results of this study to [facility] on 10:28 AM       Workstation performed: [id number]         CT head without contrast   Final Result by Neha Isidro MD (06/03 1438)      No acute intracranial abnormality  Unchanged microangiopathic changes  Workstation performed: [id number]         CT chest abdomen pelvis wo contrast   Final Result by Neha Isidro MD (06/03 1451)         1  Technically limited study due to motion  2   No acute abnormality identified in the chest, abdomen, or pelvis  3   Gallstones without evidence for cholecystitis or biliary obstruction  4   Additional findings as noted                    Workstation performed: [id number]           CBC:   Results from last 7 days   Lab Units 06/09/22  0457 06/08/22  0330 06/07/22  1315   WBC Thousand/uL 12 49* 9 34 11 30*   RBC Million/uL 2 79* 2 56* 2 87*   HEMOGLOBIN g/dL 8 2* 7 8* 8 6*   HEMATOCRIT % 26 6* 24 1* 26 6*   MCV fL 95 94 93   PLATELETS Thousands/uL 273 227 230   , BMP/CMP:   Results from last 7 days   Lab Units 06/09/22  0457 06/08/22  0330 06/07/22  1315 06/06/22  0935 06/06/22  0929 06/06/22  0336 06/05/22  0447 06/04/22  0254   SODIUM mmol/L 144 145 144   < >  --  144 146* 142   POTASSIUM mmol/L 3 3* 3 2* 3 5   < >  --  4 0 3 4* 4 1   CHLORIDE mmol/L 111* 114* 114*   < >  --  113* 115* 110*   CO2 mmol/L 24 25 22   < >  --  24 23 23   CO2, I-STAT mmol/L  --   --   --   --  22  --   --   --    BUN mg/dL 13 14 16   < >  --  25 19 20   CREATININE mg/dL 0 55* 0 62 0 69   < >  --  0 80 0 81 0 77   GLUCOSE, ISTAT mg/dl  --   --   --   --  121  --   --   --    CALCIUM mg/dL 9 0 8 8 9 1   < >  --  8 4 8 3 8 1*   AST U/L  --   --   --   --   --  19 24 16   ALT U/L  --   --   --   --   --  10* 9* 10*   ALK PHOS U/L  --   --   --   --   --  48 54 53   EGFR ml/min/1 73sq m 77 74 71   < >  --  60 59 63    < > = values in this interval not displayed  , Vitamin B12:   , HgBA1C:   Results from last 7 days   Lab Units 06/07/22  1315   HEMOGLOBIN A1C % 4 8   , TSH:   Results from last 7 days   Lab Units 06/03/22  1311   TSH 3RD GENERATON uIU/mL 3 643   , Coagulation:   Results from last 7 days   Lab Units 06/06/22  0935   INR  1 15   , Lipid Profile:   Results from last 7 days   Lab Units 06/07/22  1315   HDL mg/dL 45*   LDL CALC mg/dL 86   TRIGLYCERIDES mg/dL 111     VTE Prophylaxis: Sequential compression device (Venodyne)  and Reason for no pharmacologic prophylaxis GI bleed concern/anemia    Total time spent today 20 minutes  Discussed plan of care with primary team: reviewed MRI brain with bi-hemisphere stroke, largest in R BRITTNEY, single antiplatelet if able to take PO meds, await palliative care involvement, feel neurologically goals of care/hospice discussion is appropriate

## 2022-06-09 NOTE — ASSESSMENT & PLAN NOTE
Most likely diverticular bleed source-  GI input appreciated--Dr Syde Mills following  Status post packed red cell transfusion  Hemoglobin is 8 2  On Protonix  Continue to trend Your discharge plan includes access to our free Care Transitions program.     As your Care Transition Nurse I will contact you via phone within 2 business days after your discharge from the hospital. Please have your discharge instructions and medications ready for review. Over the next 30 days I will call you at least once a week. I am able to assist with arranging follow-up appointments. I have direct contact with your physicians and will alert them with any health or medication concerns or relay your questions.     Your Care Transitions Nurse is Umm Grady    If you have any questions or concerns after your discharge and prior to our first call, you can reach me at  [phone].

## 2022-06-09 NOTE — CONSULTS
Consultation - Palliative and Supportive Care   Iram Pop Dornsife 80 y o  female [id number]    Patient Active Problem List   Diagnosis    Essential hypertension    Mild intermittent asthma without complication    Minor depression    Gastroesophageal reflux disease without esophagitis    Unsteady gait    Pneumonia    Sepsis ([facility] [address] )    Hyponatremia    Hypercalcemia    Encephalopathy, metabolic    Cystitis    Acute diverticulitis    Rectal bleeding    Acute blood loss anemia    CVA (cerebral vascular accident) ([facility] [address] )    Unresponsive episode       Assessment/Problems Actively Addressed:  Goals of care counseling  Sepsis  Acute blood loss anemia  Diverticular bleed, not candidate for intervention  Acute CVA with severe swallowing deficit  Cystitis  Metabolic encephalopathy    Plan/Goals:  -Level 3 code status  -Called and spoke with [employer] re: hospice vs artificial nutrition    -We discussed various hospice locations   -Kinnie Drivers does not think feeding tube is in her mother's best interest; would like to speak with family about decision   -Kinnie Drivers will alert case management when she has decided   -Discussed with primary team, CM    -Palliative will follow and remain available for questions     -Symptom management:  · Will defer symptom management to the primary team at this time  Social support:   Time spent providing supportive listening   Validated family's experience              I have reviewed the patient's controlled substance dispensing history in the Prescription Drug Monitoring Program in compliance with the [facility] regulations before prescribing any controlled substances  Last refills - N/A    Decisional apparatus:  Patient is not competent on exam today  If competence is lost, patient's substitute decision maker would default to [facility] by [place]    Advance Directive/Living Will: none on file  POLST: none on file  POA Forms: none on file    We appreciate the invitation to be involved in this patient's care  We will continue to follow throughout this hospitalization  Please do not hesitate to reach our on call provider through our clinic answering service at  should you have acute symptom control concerns  NAWAF Méndez  Palliative and Supportive Care  Clinic/Answering Service: [phone]  You can find me on [employer]! IDENTIFICATION:  Inpatient consult to Palliative Care  Consult performed by: Ranjit Le PA-C  Consult ordered by: Yee Mir MD        Physician Requesting Consult: Yee Mir MD  Reason for Consult / Principal Problem: GOC counseling and SM secondary to acute CVA with detrimental deficits    History of Present Illness:  Joselyn Daley is a 80 y o  female who presents with a palliative diagnosis of CVA with significant deficit, diverticular bleed was brought into the ED at [address] [facility] [address] [facility] [address] on 06/03/2022 secondary to acute rectal bleed  Shawnee lives in assisted living facility [facility], and presented after she developed rectal bleed  She had a reported episode unresponsiveness  CT scan suspicious for inflamed diverticulosis  She also had evidence of UTI  She was seen by gastroenterology consultation who recommended no invasive procedure given multiple comorbidities and advanced age  Her daughter confirmed that patient is DNR/DNI  On 06/06, Irina became a rapid response due to unresponsiveness  Imaging revealed new CVA  Neurology assessed the patient and recommended EEG to rule out underlying seizure  MRI confirms acute CVA  Anti-platelet medications have been discussed with Gastroenterology given Irina's GI bleeding  Speech pathology has assessed the patient  She had severely impaired swallowing ability and was considered inappropriate for oral diet  Palliative care was consulted by primary team to assist with goals of care discussion  I spoke with daughter Danyell Pedro on 06/09/2022  We reviewed Shawnee's status with SLP, and the fact that she is still not able to swallow  Mary Lou Castaneda believes that a feeding tube is not in her mother's best interest and would like time to talk to her family about this  She had questions about various hospice locations and we reviewed home hospice versus hospice at living facility  We discussed prognosis and other concerns  Mary Lou Castaneda plans to reach out to case management or Dr Winter Benitez team when she has reached a decision  Review of Systems:   Review of Systems   Unable to perform ROS: acuity of condition       Past Medical History:   Diagnosis Date    A-fib [facility])     Gastrointestinal hemorrhage associated with intestinal diverticulitis     Osteoarthritis     UTI (urinary tract infection)      History reviewed  No pertinent surgical history  Social History     Socioeconomic History    Marital status:      Spouse name: Not on file    Number of children: Not on file    Years of education: Not on file    Highest education level: Not on file   Occupational History    Not on file   Tobacco Use    Smoking status: Former Smoker    Smokeless tobacco: Never Used   Vaping Use    Vaping Use: Never used   Substance and Sexual Activity    Alcohol use: Never    Drug use: No    Sexual activity: Not Currently   Other Topics Concern    Not on file   Social History Narrative    Dental care: occasionally    Lives w/family    Living situation: supportive and safe    No advance directives     Social Determinants of Health     Financial Resource Strain: Not on file   Food Insecurity: No Food Insecurity    Worried About Running Out of Food in the Last Year: Never true    Kayla of Food in the Last Year: Never true   Transportation Needs: No Transportation Needs    Lack of Transportation (Medical): No    Lack of Transportation (Non-Medical):  No   Physical Activity: Not on file   Stress: Not on file   Social Connections: Not on file   Intimate Partner n/a Violence: Not on file   Housing Stability: Unknown    Unable to Pay for Housing in the Last Year: Not on file    Number of Places Lived in the Last Year: Not on file    Unstable Housing in the Last Year: No     Family History   Problem Relation Age of Onset    No Known Problems Mother     No Known Problems Father        Medications:  all current active meds have been reviewed and current meds:   Current Facility-Administered Medications   Medication Dose Route Frequency    albuterol (PROVENTIL HFA,VENTOLIN HFA) inhaler 2 puff  2 puff Inhalation Q4H PRN    aluminum-magnesium hydroxide-simethicone (MYLANTA) oral suspension 30 mL  30 mL Oral Q6H PRN    atorvastatin (LIPITOR) tablet 40 mg  40 mg Oral Daily With Dinner    brinzolamide (AZOPT) 1 % ophthalmic suspension 1 drop  1 drop Both Eyes HS    cefTRIAXone (ROCEPHIN) IVPB (premix in dextrose) 1,000 mg 50 mL  1,000 mg Intravenous Daily    clopidogrel (PLAVIX) tablet 75 mg  75 mg Oral Daily    nystatin (MYCOSTATIN) cream   Topical BID    ondansetron (ZOFRAN) injection 4 mg  4 mg Intravenous Q6H PRN    potassium chloride 20 mEq IVPB (premix)  20 mEq Intravenous Once    sodium chloride (PF) 0 9 % injection 3 mL  3 mL Intravenous Q8H [address]       Allergies   Allergen Reactions    Sulfa Antibiotics Rash     per t sys    Sulfamethoxazole-Trimethoprim Rash         Medications    Current Facility-Administered Medications:     albuterol (PROVENTIL HFA,VENTOLIN HFA) inhaler 2 puff, 2 puff, Inhalation, Q4H PRN, Yee Fly, DO    aluminum-magnesium hydroxide-simethicone (MYLANTA) oral suspension 30 mL, 30 mL, Oral, Q6H PRN, Yee Fly, DO    brinzolamide (AZOPT) 1 % ophthalmic suspension 1 drop, 1 drop, Both Eyes, HS, Yee Fly, DO, 1 drop at 06/08/22 2233    cefTRIAXone (ROCEPHIN) IVPB (premix in dextrose) 1,000 mg 50 mL, 1,000 mg, Intravenous, Daily, Yee Fly, DO, Last Rate: 100 mL/hr at 06/08/22 1346, 1,000 mg at 06/08/22 1346    dextrose 5 % and sodium chloride 0 9 % infusion, 75 mL/hr, Intravenous, Continuous, Trista Torres MD, Last Rate: 75 mL/hr at 06/09/22 0458, 75 mL/hr at 06/09/22 0458    nystatin (MYCOSTATIN) cream, , Topical, BID, Luz Way DO, Given at 06/09/22 0827    ondansetron (ZOFRAN) injection 4 mg, 4 mg, Intravenous, Q6H PRN, Yee Fly, DO    Insert peripheral IV, , , Once **AND** sodium chloride (PF) 0 9 % injection 3 mL, 3 mL, Intravenous, Q8H [facility], Yee Fly, DO, 3 mL at 06/09/22 0501    Objective  /71   Pulse 85   Temp 98 3 °F (36 8 °C)   Resp 18   Ht 5' 4" (1 626 m)   Wt 59 kg (130 lb 1 1 oz)   SpO2 94%   BMI 22 33 kg/m²     Physical Exam:   Physical Exam  Vitals and nursing note reviewed  Exam conducted with a chaperone present  Constitutional:       Appearance: She is ill-appearing  Comments: Opens eyes spontaneously however does not respond verbally to questions  Right gaze preference   Eyes:      Pupils: Pupils are equal, round, and reactive to light  Pupils are equal    Pulmonary:      Effort: Pulmonary effort is normal  No respiratory distress  Abdominal:      General: There is no distension  Tenderness: There is no abdominal tenderness  Skin:     Coloration: Skin is pale  Neurological:      Mental Status: She is alert  She is disoriented  Motor: Weakness present  Lab Results:   I have personally reviewed pertinent labs  , CBC:   Lab Results   Component Value Date    WBC 12 49 (H) 06/09/2022    HGB 8 2 (L) 06/09/2022    HCT 26 6 (L) 06/09/2022    MCV 95 06/09/2022     06/09/2022    MCH 29 4 06/09/2022    MCHC 30 8 (L) 06/09/2022    RDW 17 6 (H) 06/09/2022    MPV 10 1 06/09/2022    NRBC 0 06/09/2022   , CMP:   Lab Results   Component Value Date    SODIUM 144 06/09/2022    K 3 3 (L) 06/09/2022     (H) 06/09/2022    CO2 24 06/09/2022    BUN 13 06/09/2022    CREATININE 0 55 (L) 06/09/2022    CALCIUM 9 0 06/09/2022    EGFR 77 06/09/2022     Imaging Studies: I have personally reviewed pertinent reports  EKG, Pathology, and Other Studies: I have personally reviewed pertinent reports  Counseling / Coordination of Care  Total floor / unit time spent today 45 minutes  Greater than 50% of total time was spent with the patient and / or family counseling and / or coordination of care  A description of the counseling / coordination of care: Reviewed chart, provided medical updates, determined goals of care, discussed palliative care and symptom management, discussed comfort care and hospice care, discussed code status, determined competency and POA/HCA, determined social/family support, provided psychosocial support  Reviewed with SLIM team, RN and CM  Portions of this document may have been created using dictation software and as such some "sound alike" terms may have been generated by the system  Do not hesitate to contact me with any questions or clarifications

## 2022-06-09 NOTE — ASSESSMENT & PLAN NOTE
Patient diagnosed with CVA  On stroke pathway  Neurology consult appreciated  CT head and CTA-showed New right BRITTNEY distribution infarct in the paramedian right posterior frontal/parietal lobes  On telemetry  Hold Toprol-XL  P T /OT/ST  HB A1c and lipid panel reviewed  Hold aspirin and Plavix in setting of GI bleed  As per GI hemoglobin is stable can start on Plavix  Speech and swallow evaluation as patient is NPO  MRI brain results as above  EEG results reviewed  On Lipitor  Neurology follow-up  Patient was seen by speech specialist and they recommend starting on dysphagia pureed diet with nectar thick liquids  Discussed with daughter at length and she feels that family would not want to go ahead with tube feeding     Palliative care consult

## 2022-06-09 NOTE — PLAN OF CARE
Problem: Neurological Deficit  Goal: Neurological status is stable or improving  Description: Interventions:  - Monitor and assess patient's level of consciousness, motor function, sensory function, and level of assistance needed for ADLs  - Monitor and report changes from baseline  Collaborate with interdisciplinary team to initiate plan and implement interventions as ordered  - Provide and maintain a safe environment  - Consider seizure precautions  - Consider fall precautions  - Consider aspiration precautions  - Consider bleeding precautions  6/9/2022 1009 by Lilliam Spear RN  Outcome: Not Progressing  6/9/2022 1008 by Lilliam Spear RN  Outcome: Not Progressing     Problem: Activity Intolerance/Impaired Mobility  Goal: Mobility/activity is maintained at optimum level for patient  Description: Interventions:  - Assess and monitor patient  barriers to mobility and need for assistive/adaptive devices  - Assess patient's emotional response to limitations  - Collaborate with interdisciplinary team and initiate plans and interventions as ordered  - Encourage independent activity per ability   - Maintain proper body alignment  - Perform active/passive rom as tolerated/ordered  - Plan activities to conserve energy   - Turn patient as appropriate  6/9/2022 1009 by Lilliam Spear RN  Outcome: Not Progressing  6/9/2022 1008 by Lilliam Spear RN  Outcome: Not Progressing     Problem: Communication Impairment  Goal: Ability to express needs and understand communication  Description: Assess patient's communication skills and ability to understand information  Patient will demonstrate use of effective communication techniques, alternative methods of communication and understanding even if not able to speak  - Encourage communication and provide alternate methods of communication as needed  - Collaborate with case management/ for discharge needs    - Include patient/family/caregiver in decisions related to communication  6/9/2022 1009 by Jessa Sosa RN  Outcome: Not Progressing  6/9/2022 1008 by Jessa Sosa RN  Outcome: Not Progressing     Problem: Potential for Aspiration  Goal: Non-ventilated patient's risk of aspiration is minimized  Description: Assess and monitor vital signs, respiratory status, and labs (WBC)  Monitor for signs of aspiration (tachypnea, cough, rales, wheezing, cyanosis, fever)  - Assess and monitor patient's ability to swallow  - Place patient up in chair to eat if possible  - HOB up at 90 degrees to eat if unable to get patient up into chair   - Supervise patient during oral intake  - Instruct patient/ family to take small bites  - Instruct patient/ family to take small single sips when taking liquids    - Follow patient-specific strategies generated by speech pathologist   6/9/2022 1009 by Jessa Sosa RN  Outcome: Not Progressing  6/9/2022 1008 by Jessa Sosa RN  Outcome: Not Progressing

## 2022-06-09 NOTE — ASSESSMENT & PLAN NOTE
More lethargic on day prior to admission and then had an episode of unresponsiveness on a m  Of admission at assisted living facility  In setting of CVA and sepsis secondary to UTI  Neurology on board  Stroke workup ongoing  MRI brain showed-Dense right anterior cerebral artery distribution nonhemorrhagic ischemia in keeping with right A3 anterior cerebral artery occlusion identified on CT angiography of June 6, 2022  Tiny focus of acute ischemia in the posterior inferomedial left frontal lobe    EEG  results are reviewed

## 2022-06-09 NOTE — QUICK NOTE
Quick note -   Initial call placed to [facility] [place] this AM  Left detailed messaged requesting call back  I will call again this afternoon if there is no response  Plan to discuss hospice cares vs feeding tube, etc       Official consult to follow

## 2022-06-09 NOTE — ASSESSMENT & PLAN NOTE
Gi consult appreciated  Status post packed red cell transfusion  Hemoglobin last evening is 8 2  No active bleeding overnight  On Protonix  Hold off on endoscopy as per GI

## 2022-06-09 NOTE — PLAN OF CARE
Problem: OCCUPATIONAL THERAPY ADULT  Goal: Performs self-care activities at highest level of function for planned discharge setting  See evaluation for individualized goals  Description: Treatment Interventions: ADL retraining, Visual perceptual retraining, Endurance training, UE strengthening/ROM, Functional transfer training, Cognitive reorientation, Neuromuscular reeducation, Fine motor coordination activities, Patient/family training, Equipment evaluation/education, Compensatory technique education          See flowsheet documentation for full assessment, interventions and recommendations  Outcome: Progressing  Note: Limitation: Decreased ADL status, Decreased self-care trans, Decreased high-level ADLs, Decreased UE ROM, Decreased UE strength, Decreased Safe judgement during ADL, Decreased cognition, Visual deficit, Non-func L UE  Prognosis: Guarded  Assessment: Pt seen for OT tx session with focus on therapeutic exercises/activities, visual remediation activities, and ADL status Pt received supine in bed  Chidi Susan Kurtz at bedside and agreeable to have pt participate with therapy  Pt received in supine position  Pt asleep but arousable with gentle sternal rub  Pt participated in BUE therapeutic exercises  RUE notably swollen today, however pt able to participate in therapeutic exercises/activities  Pt able to make some eye contact today primarily in R visual field  With total A in turning head to L, able to momentarily make eye contact with this writer  Pt also able to perform activity requiring grasp/release of object, crossing midline, and scanning  Pt remains non-verbal  Patient continues to be functioning below baseline level, occupational performance remains limited secondary to factors listed above, and pt at increased risk for falls and injury  The patient's raw score on the AM-PAC Daily Activity inpatient short form is 6, standardized score is  , less than 39 4   Patients at this level are likely to benefit from DC to post-acute rehabilitation services  Please refer to the recommendation of the Occupational Therapist for safe DC planning  From OT standpoint, recommendation at time of d/c would be Short Term Rehab  Patient to benefit from continued Occupational Therapy treatment while in the hospital to address deficits as defined above and maximize level of functional independence with ADLs and functional mobility  Pt left with call bell in reach, tray table in reach, needs met, bed alarm activated, SCDs on  RN aware       OT Discharge Recommendation: Post acute rehabilitation services

## 2022-06-09 NOTE — CASE MANAGEMENT
Case Management Discharge Planning Note    Patient name Kenneth Kessler  Location /-69 MRN [id number]  : 6/10/1921 Date 2022       Current Admission Date: 6/3/2022  Current Admission Diagnosis:Sepsis [facility])   Patient Active Problem List    Diagnosis Date Noted    Unresponsive episode     CVA (cerebral vascular accident) ([facility] [address] ) 2022    Acute blood loss anemia 2022    Cystitis 2022    Acute diverticulitis 2022    Rectal bleeding 2022    Pneumonia 2019    Sepsis ([facility] [address] ) 2019    Hyponatremia 2019    Hypercalcemia 2019    Encephalopathy, metabolic     Unsteady gait 2019    Essential hypertension 2018    Mild intermittent asthma without complication     Minor depression 2018    Gastroesophageal reflux disease without esophagitis 2018      LOS (days): 6  Geometric Mean LOS (GMLOS) (days): 4 80  Days to GMLOS:-1 2     OBJECTIVE:  Risk of Unplanned Readmission Score: 11 56         Current admission status: Inpatient   Preferred Pharmacy:   [address]  [place] [address]  [address] [facility] [address]  Phone: [phone] Fax: [phone] [address] [place] [address]  Phone: [phone] Fax: [phone]    Primary Care Provider: Colette Murrell MD    Primary Insurance: MEDICARE  Secondary Insurance:     DISCHARGE DETAILS:  Met with patient's daughter,Elier  She is aware patient has failed feeding trials and may need a feeding tube  Ching Pierce stated she knows her mother would not want a feeding tube  Hospice at [facility] was discussed and she wishes to spak with her siblings about Hospice   CM will reach out to Ching Pierce in the am  Virtual Brief Visit    Assessment/Plan:    Problem List Items Addressed This Visit        Respiratory    Moderate persistent asthma without complication - Primary    Pneumonia due to COVID-19 virus     Continue and complete antibiotics, push fluids,  And continue inhalers  Reason for visit is   Chief Complaint   Patient presents with    Virtual Brief Visit        Encounter provider Ewelina Rincon PA-C    Provider located at [place] [address] [address] [place] [address]    Recent Visits  Date Type Provider Dept   01/13/21 Telemedicine Ewelina Rincon PA-C [facility]   01/12/21 Telephone Ara Crow MA [facility]   Showing recent visits within past 7 days and meeting all other requirements     Today's Visits  Date Type Provider Dept   01/18/21 Telemedicine Ewelina Rincon PA-C [facility]   01/18/21 Telephone [facility]   Showing today's visits and meeting all other requirements     Future Appointments  No visits were found meeting these conditions  Showing future appointments within next 150 days and meeting all other requirements        After connecting through telephone, the patient was identified by name and date of birth  Boubacar Correa was informed that this is a telemedicine visit and that the visit is being conducted through telephone  My office door was closed  No one else was in the room  She acknowledged consent and understanding of privacy and security of the platform  The patient has agreed to participate and understands she can discontinue the visit at any time  Patient is aware this is a billable service  Subjective    Boubacar Correa is a 62 y o  female Patient concerned about recent dx  Of COVID pneumonia  Still having SOB and cough     Has a few more days of antibiotics left and using inhaler  Still very weak, but no more vomiting  Does have congestion and hoarseness  Did vomit few days ago  Past Medical History:   Diagnosis Date    Ankle sprain     left    Anxiety disorder     Bipolar 2 disorder (HCC)     Chronic back pain     CKD (chronic kidney disease) stage 3, GFR 30-59 ml/min     stage 4 (as per pt)    CVA (cerebral vascular accident) ([address] )     noted on MRI in the past    Depression     GERD (gastroesophageal reflux disease)     Hypercholesteremia     Hyperlipidemia     Hypernatremia     Hypertension     Hypokalemia     Intervertebral disc disorder with radiculopathy of lumbosacral region     resolved: 2015    Kidney disease     Panic attacks     Pericardial effusion     PONV (postoperative nausea and vomiting)     Radiculitis     resolved: 2015    Secondary renal hyperparathyroidism ([facility] [address] )     Vitamin D deficiency        Past Surgical History:   Procedure Laterality Date    BUNIONECTOMY      Left foot     COLON SURGERY      COLONOSCOPY  2018    DILATION AND CURETTAGE OF UTERUS      INDUCED       surgically induced    [place] ANASTOMOSIS,AV,ANY SITE Left 2019    Procedure: CREATION FISTULA ARTERIOVENOUS (AV) left wrists possible left upper;  Surgeon: Dewayne Newman MD;  [facility];  Service: Vascular    [place] CORRJ HALLUX VALGUS W/SESMDC W/DIST METAR OSTEOT Left 2019    Procedure: Greenfield Friedman;  Surgeon: Osman Romo DPM;  [facility];  Service: Podiatry    [place] W/SESMDC W/DIST Hezzie Milks Right 8/3/2020    Procedure: Manny Cruz;  Surgeon: Osman Romo DPM;  [facility];  Service: Podiatry    [place] ERCP DX COLLECTION SPECIMEN BRUSHING/WASHING N/A 2018    Procedure: ENDOSCOPIC RETROGRADE CHOLANGIOPANCREATOGRAPHY (ERCP);   Surgeon: Kyle Du MD;  [facility];  Service: Gastroenterology    [place] LAP, SURG PROCTOPEXY N/A 7/13/2018    Procedure: ROBOTIC SIGMOID RESECTION / RECTOPEXY;  Surgeon: Abhishek Rivero MD;  Location[facility];  Service: Colorectal    CT SIGMOIDOSCOPY FLX DX W/COLLJ SPEC BR/WA IF PFRMD N/A 7/13/2018    Procedure: SIGMOIDOSCOPY FLEXIBLE;  Surgeon: Abhishek Rivero MD;  [facility];  Service: Colorectal    TUBAL LIGATION Bilateral 1997    US GUIDED THYROID BIOPSY  7/30/2019       Current Outpatient Medications   Medication Sig Dispense Refill    acetaminophen (TYLENOL) 325 mg tablet Take 650 mg by mouth every 6 (six) hours as needed for mild pain      AMILoride 5 mg tablet Take 1 tablet by mouth twice daily 120 tablet 0    aspirin (ECOTRIN LOW STRENGTH) 81 mg EC tablet Take 1 tablet (81 mg total) by mouth daily 30 tablet 0    atorvastatin (LIPITOR) 40 mg tablet Take 1 tablet (40 mg total) by mouth daily 30 tablet 11    budesonide-formoterol (SYMBICORT) 160-4 5 mcg/act inhaler Inhale 2 puffs 2 (two) times a day Rinse mouth after use   (Patient not taking: Reported on 12/29/2020) 3 Inhaler 3    carvedilol (COREG) 3 125 mg tablet Take 2 tablets (6 25 mg total) by mouth 2 (two) times a day with meals (Patient taking differently: Take 6 25 mg by mouth 2 (two) times a day with meals ) 180 tablet 0    clonazePAM (KlonoPIN) 0 5 mg tablet Take 1 tablet (0 5 mg total) by mouth 3 (three) times a day 270 tablet 0    fluocinonide (LIDEX) 0 05 % ointment Apply topically 2 (two) times a day 60 g 1    fluvoxaMINE (LUVOX) 100 mg tablet 3 at bedtime      HYDROcodone-acetaminophen (NORCO) 5-325 mg per tablet Take 1 tablet by mouth every 6 (six) hours as needed for painMax Daily Amount: 4 tablets 20 tablet 0    lamoTRIgine (LaMICtal) 200 MG tablet Take 400 mg by mouth daily at bedtime       levofloxacin (LEVAQUIN) 500 mg tablet Take 1 tablet (500 mg total) by mouth every 24 hours for 7 days 7 tablet 0    linaCLOtide (Linzess) 290 MCG CAPS Take 1 capsule by mouth daily 90 capsule 3    naloxone (NARCAN) 4 mg/0 1 mL nasal spray Administer 1 spray into a nostril  If breathing does not return to normal or if breathing difficulty resumes after 2-3 minutes, give another dose in the other nostril using a new spray  1 each 1    omeprazole (PriLOSEC) 20 mg delayed release capsule Take 1 capsule (20 mg total) by mouth daily at bedtime 90 capsule 3    ondansetron (ZOFRAN-ODT) 4 mg disintegrating tablet Dissolve 1 tablet in mouth every 8 hours prn nausea and vomiting 60 tablet 1    Polyethylene Glycol 3350 (MIRALAX PO) Take by mouth as needed       predniSONE 10 mg tablet Take 3 pills for 3 days, then 2 pills for 3 days, then 1 pill daily until competed  20 tablet 0    QUEtiapine (SEROquel) 100 mg tablet Take 1 tablet by mouth daily at bedtime 1 at bedtime in addition to 400 mg tab      QUEtiapine (SEROQUEL) 300 mg tablet Take 1 tablet (300 mg total) by mouth every morning 1 in the AM     ( also takes 400 mg at bedtime) 90 tablet 3    QUEtiapine (SEROquel) 400 MG tablet 1 at bedtime     (also takes 100 mg and 300 mg tabs)      traMADol (ULTRAM) 50 mg tablet Take 2 tabs by mouth twice daily 120 tablet 0     No current facility-administered medications for this visit  Allergies   Allergen Reactions    Pollen Extract Nasal Congestion       Review of Systems   Constitutional: Positive for chills, diaphoresis and fatigue  Negative for fever  HENT: Positive for congestion  Negative for ear pain, rhinorrhea and sore throat  Respiratory: Positive for cough, chest tightness and shortness of breath  Gastrointestinal: Negative for abdominal pain, diarrhea, nausea and vomiting  There were no vitals filed for this visit  I spent 20 minutes directly with the patient during this visit    [facility] acknowledges that she has consented to an online visit or consultation   She understands that the online visit is based solely on information provided by her, and that, in the absence of a face-to-face physical evaluation by the physician, the diagnosis she receives is both limited and provisional in terms of accuracy and completeness  This is not intended to replace a full medical face-to-face evaluation by the physician  Sue Haddad understands and accepts these terms

## 2022-06-09 NOTE — SPEECH THERAPY NOTE
Speech Language/Pathology    Speech/Language Pathology Progress Note    Patient Name: Landon Fabry  XVVGA'[id number] Date: 6/9/2022     Problem List  Principal Problem:    [facility]epsis ([facility] )  Active Problems:    Mild intermittent asthma without complication    Gastroesophageal reflux disease without esophagitis    Encephalopathy, metabolic    Cystitis    Acute diverticulitis    Rectal bleeding    Acute blood loss anemia    CVA (cerebral vascular accident) ([facility] [address] )    Unresponsive episode       Past Medical History  Past Medical History:   Diagnosis Date    A-fib [facility])     Gastrointestinal hemorrhage associated with intestinal diverticulitis     Osteoarthritis     UTI (urinary tract infection)         Past Surgical History  History reviewed  No pertinent surgical history  Subjective:  Pt easily wakes upon SLP arrival and opens oral cavity to visual of chocolate pudding  Objective:  Pt seen for dx dysphagia tx f/u  Pt seen w/ trials pf puree, ht, nt, and thin liquids  Pt w/ weak labial seal for retrieval and oral containment  At least mild anterior spill to L side of all material  Pt w/ oral holding of all, needs consistent verbal cues to transfer  At times transfer is prompt  Despite variable transfer delays, all transfers piecemeal w/ noted oral residue- at times requiring suction when secondary swallow is unable to be initiated  Pharyngeal movement is palpated, judged to be weak and suspect swallow delay  No overt s/s aspiration w/ transferred puree and honey thick via tsp  Note cough response w/ nectar thick and thin liquids  S/w physician regarding concerns for consistent intake to meet nutritional needs, though apparent tolerance of small amounts of material today  Assessment:  Pt w/ severe oral dysphagia james by poor oral control, severely prolonged/delayed transfer of material, and oral residue w/ all  No overt s/s aspiration w/ small amounts puree and honey thick       Plan/Recommendations:  Consider puree and honey thick via tsp though pt continues w/ high aspiration risk given severity of oral dysphagia and high risk of dehydration/malnutrition, suspect pt will be unable to functionally meet nutritional needs at this time  Frequent and thorough oral care  ST f/u as able and appropriate

## 2022-06-09 NOTE — PROGRESS NOTES
[place]  Progress Note - Iram Pop Josensife 6/10/1921, 80 y o  female MRN: [id number]  Unit/Bed#: -01 Encounter: [id number]  Primary Care Provider: Elida Brown MD   Date and time admitted to hospital: 6/3/2022 12:56 PM    CVA (cerebral vascular accident) [facility])  Assessment & Plan  Patient diagnosed with CVA  On stroke pathway  Neurology consult appreciated  CT head and CTA-showed New right BRITTNEY distribution infarct in the paramedian right posterior frontal/parietal lobes  On telemetry  Hold Toprol-XL  P T /OT/ST  HB A1c and lipid panel reviewed  Hold aspirin and Plavix in setting of GI bleed  As per GI hemoglobin is stable can start on Plavix  Speech and swallow evaluation as patient is NPO  MRI brain results as above  EEG results reviewed  On Lipitor  Neurology follow-up  Patient was seen by speech specialist and they recommend starting on dysphagia pureed diet with nectar thick liquids  Discussed with daughter at length and she feels that family would not want to go ahead with tube feeding     Palliative care consult    Acute blood loss anemia  Assessment & Plan  Most likely diverticular bleed source-  GI input appreciated--Dr BURGESS [facility] following  Status post packed red cell transfusion  Hemoglobin is 8 2  On Protonix  Continue to trend    Rectal bleeding  Assessment & Plan  Gi consult appreciated  Status post packed red cell transfusion  Hemoglobin last evening is 8 2  No active bleeding overnight  On Protonix  Hold off on endoscopy as per GI    Acute diverticulitis  Assessment & Plan  Noted on ct- will -continue antibioitcs   GI input appreciated felt to be diverticular bleed but no evidence of perforation   Likely source of GI bleed  GI on board    Cystitis  Assessment & Plan  Urine culture with greater than 100,000 colonies of E coli  Continue Rocephin  Trend WBC and fever curve    Encephalopathy, metabolic  Assessment & Plan  More lethargic on day prior to admission and then had an episode of unresponsiveness on a m  Of admission at assisted living facility  In setting of CVA and sepsis secondary to UTI  Neurology on board  Stroke workup ongoing  MRI brain showed-Dense right anterior cerebral artery distribution nonhemorrhagic ischemia in keeping with right A3 anterior cerebral artery occlusion identified on CT angiography of June 6, 2022  Tiny focus of acute ischemia in the posterior inferomedial left frontal lobe  EEG  results are reviewed                                 Gastroesophageal reflux disease without esophagitis  Assessment & Plan  On Protonix    Mild intermittent asthma without complication  Assessment & Plan  Patient without signs of sob- contintinue usual meds    * Sepsis ([address] )  Assessment & Plan  Patient with fever and altered mental status in setting  also acute cystitis with hematuria   elevated lactic aci level -on presentation  being given 2nd iv bolus by Er staff  Blood pressure stabilized  1/2 blood cultures are positive for coagulase-negative Staph-likely contaminant  Repeat blood cultures are negative to date        Labs & Imaging: I have personally reviewed pertinent reports  VTE Prophylaxis: in place  Code Status:   Level 3 - DNAR and DNI    Patient Centered Rounds: I have performed bedside rounds with nursing staff today  Discussions with Specialists or Other Care Team Provider: CM    Education and Discussions with Family / Patient:  Daughter Rosibel Nunez at bedside    Current Length of Stay: 6 day(s)    Current Patient Status: Inpatient   Certification Statement: The patient will continue to require additional inpatient hospital stay due to see my assessment and plan  Subjective:   Patient is seen and examined at bedside  No new complaints  Opens eyes to verbal commands  Has dysarthria      Objective:    Vitals: Blood pressure 162/71, pulse 85, temperature 98 3 °F (36 8 °C), resp   rate 18, height 5' 4" (1 626 m), weight 59 kg (130 lb 1 1 oz), SpO2 94 %, not currently breastfeeding  ,Body mass index is 22 33 kg/m²  SPO2 RA Rest    Flowsheet Row ED to Hosp-Admission (Current) from 6/3/2022 in [address] Med Surg Unit   SpO2 94 %   SpO2 Activity At Rest   O2 Device None (Room air)   O2 Flow Rate --        I&O:     Intake/Output Summary (Last 24 hours) at 6/9/2022 1135  Last data filed at 6/9/2022 0857  Gross per 24 hour   Intake --   Output 1100 ml   Net -1100 ml       Physical Exam:    General- Alert, lying comfortably in bed  Not in any acute distress  Neck- Supple, No JVD  CVS- regular, S1 and S2 normal  Chest- Bilateral Air entry, No rhochi, crackles or wheezing present  Abdomen- soft, nontender, not distended, no guarding or rigidity, BS+  Extremities-  No pedal edema, No calf tenderness  CNS-   Alert, awake    Left-sided weakness with dysarthria    Invasive Devices  Report    Peripheral Intravenous Line  Duration           Peripheral IV 06/05/22 Right;Ventral (anterior) Forearm 4 days    Peripheral IV 06/06/22 Left Antecubital 3 days          Drain  Duration           Urethral Catheter Temperature probe 5 days                      Social History  reviewed  Family History   Problem Relation Age of Onset    No Known Problems Mother     No Known Problems Father     reviewed    Meds:  Current Facility-Administered Medications   Medication Dose Route Frequency Provider Last Rate Last Admin    albuterol (PROVENTIL HFA,VENTOLIN HFA) inhaler 2 puff  2 puff Inhalation Q4H PRN Yee Fly, DO        aluminum-magnesium hydroxide-simethicone (MYLANTA) oral suspension 30 mL  30 mL Oral Q6H PRN Yee Fly, DO        atorvastatin (LIPITOR) tablet 40 mg  40 mg Oral Daily With MARSHALL Martinez MD        brinzolamide (AZOPT) 1 % ophthalmic suspension 1 drop  1 drop Both Eyes HS Yee Fly, DO   1 drop at 06/08/22 2233    cefTRIAXone (ROCEPHIN) IVPB (premix in dextrose) 1,000 mg 50 mL  1,000 mg Intravenous Daily Yee Fly,  mL/hr at 06/08/22 1346 1,000 mg at 06/08/22 1346    clopidogrel (PLAVIX) tablet 75 mg  75 mg Oral Daily Ryne Noel MD        nystatin (MYCOSTATIN) cream   Topical BID Lavona Darting, DO   Given at 06/09/22 0827    ondansetron (ZOFRAN) injection 4 mg  4 mg Intravenous Q6H PRN Yee Fly, DO        potassium chloride 20 mEq IVPB (premix)  20 mEq Intravenous Once Ryne Noel MD        sodium chloride (PF) 0 9 % injection 3 mL  3 mL Intravenous Q8H [facility] Yee Fly, DO   3 mL at 06/09/22 0501      Medications Prior to Admission   Medication    albuterol (PROAIR HFA) 90 mcg/act inhaler    brinzolamide (AZOPT) 1 % ophthalmic suspension    losartan (COZAAR) 100 MG tablet    metoprolol succinate (TOPROL-XL) 100 mg 24 hr tablet    nystatin (MYCOSTATIN) cream    omeprazole (PriLOSEC) 20 mg delayed release capsule    PARoxetine (PAXIL) 30 mg tablet       Labs:  Results from last 7 days   Lab Units 06/09/22  0457 06/08/22  0330 06/07/22  1315   WBC Thousand/uL 12 49* 9 34 11 30*   HEMOGLOBIN g/dL 8 2* 7 8* 8 6*   HEMATOCRIT % 26 6* 24 1* 26 6*   PLATELETS Thousands/uL 273 227 230   NEUTROS PCT % 89* 77* 79*   LYMPHS PCT % 5* 12* 14   MONOS PCT % 6 9 7   EOS PCT % 0 1 0     Results from last 7 days   Lab Units 06/09/22  0457 06/08/22  0330 06/07/22  1315 06/06/22  0935 06/06/22  0929 06/06/22  0336 06/05/22  0447 06/04/22  0254   POTASSIUM mmol/L 3 3* 3 2* 3 5   < >  --  4 0 3 4* 4 1   CHLORIDE mmol/L 111* 114* 114*   < >  --  113* 115* 110*   CO2 mmol/L 24 25 22   < >  --  24 23 23   CO2, I-STAT mmol/L  --   --   --   --  22  --   --   --    BUN mg/dL 13 14 16   < >  --  25 19 20   CREATININE mg/dL 0 55* 0 62 0 69   < >  --  0 80 0 81 0 77   CALCIUM mg/dL 9 0 8 8 9 1   < >  --  8 4 8 3 8 1*   ALK PHOS U/L  --   --   --   --   --  48 54 53   ALT U/L  --   --   --   --   --  10* 9* 10*   AST U/L  --   --   --   --   --  19 24 16   GLUCOSE, ISTAT mg/dl  --   --   --   --  121  --   --   --     < > = values in this interval not displayed  Lab Results   Component Value Date    TROPONINI <0 02 12/08/2019    TROPONINI <0 02 11/17/2019    TROPONINI <0 02 11/12/2019     Results from last 7 days   Lab Units 06/06/22  0935 06/03/22  1311   INR  1 15 1 06     Lab Results   Component Value Date    BLOODCX No Growth at 72 hrs  06/05/2022    BLOODCX No Growth at 72 hrs  06/05/2022    BLOODCX No Growth After 5 Days  06/03/2022    BLOODCX Staphylococcus coagulase negative (A) 06/03/2022    URINECX >100,000 cfu/ml Escherichia coli (A) 06/03/2022    URINECX 40,000-49,000 cfu/ml  06/03/2022    URINECX >100,000 cfu/ml Escherichia coli (A) 12/08/2019    URINECX 60,000-69,000 cfu/ml  12/08/2019    SPUTUMCULTUR 3+ Growth of  11/17/2019    SPUTUMCULTUR  11/17/2019     Commensal respiratory mery only; No significant growth of Staph aureus/MRSA or Pseudomonas aeruginosa  Imaging:  No results found for this or any previous visit  Results for orders placed during the hospital encounter of 12/08/19    XR chest 2 views    Narrative  CHEST    INDICATION:   leukocytosis, HTN     COMPARISON:  11/17/2019    EXAM PERFORMED/VIEWS:  XR CHEST PA & LATERAL  Images: 2    FINDINGS:    Cardiomediastinal silhouette appears enlarged  The pulmonary vessels are normal     Previously described parenchymal density at the left lung base has resolved  There are no infiltrates  No pneumothorax or pleural effusion  S-shaped scoliosis  Impression  No acute cardiopulmonary disease  Cardiomegaly          Workstation performed: [id number]      Last 24 Hours Medication List:   Current Facility-Administered Medications   Medication Dose Route Frequency Provider Last Rate    albuterol  2 puff Inhalation Q4H PRN Yee Fly, DO      aluminum-magnesium hydroxide-simethicone  30 mL Oral Q6H PRN Yee Fly, DO      atorvastatin  40 mg Oral Daily With MARSHALL Martinez MD      brinzolamide  1 drop Both Eyes HS Yee Fly, DO      cefTRIAXone  1,000 mg Intravenous Daily Yee Fly, DO 1,000 mg (06/08/22 1346)    clopidogrel  75 mg Oral Daily Avila Henson MD      nystatin   Topical BID Yee Fly, DO      ondansetron  4 mg Intravenous Q6H PRN Musa Yusuf DO      potassium chloride  20 mEq Intravenous Once Avila Henson MD      sodium chloride (PF)  3 mL Intravenous Q8H Kwaku Davenport DO          Today, Patient Was Seen By: Avila Henson MD    ** Please Note: Dictation voice to text software may have been used in the creation of this document   **

## 2022-06-10 LAB
ANION GAP SERPL CALCULATED.3IONS-SCNC: 8 MMOL/L (ref 4–13)
BACTERIA BLD CULT: NORMAL
BACTERIA BLD CULT: NORMAL
BASOPHILS # BLD AUTO: 0.02 THOUSANDS/ΜL (ref 0–0.1)
BASOPHILS NFR BLD AUTO: 0 % (ref 0–1)
BUN SERPL-MCNC: 15 MG/DL (ref 5–25)
CALCIUM SERPL-MCNC: 9.5 MG/DL (ref 8.3–10.1)
CHLORIDE SERPL-SCNC: 113 MMOL/L (ref 100–108)
CO2 SERPL-SCNC: 24 MMOL/L (ref 21–32)
CREAT SERPL-MCNC: 0.52 MG/DL (ref 0.6–1.3)
EOSINOPHIL # BLD AUTO: 0.13 THOUSAND/ΜL (ref 0–0.61)
EOSINOPHIL NFR BLD AUTO: 1 % (ref 0–6)
ERYTHROCYTE [DISTWIDTH] IN BLOOD BY AUTOMATED COUNT: 17.8 % (ref 11.6–15.1)
GFR SERPL CREATININE-BSD FRML MDRD: 78 ML/MIN/1.73SQ M
GLUCOSE SERPL-MCNC: 111 MG/DL (ref 65–140)
HCT VFR BLD AUTO: 27.9 % (ref 34.8–46.1)
HGB BLD-MCNC: 8.5 G/DL (ref 11.5–15.4)
IMM GRANULOCYTES # BLD AUTO: 0.05 THOUSAND/UL (ref 0–0.2)
IMM GRANULOCYTES NFR BLD AUTO: 1 % (ref 0–2)
LYMPHOCYTES # BLD AUTO: 1.32 THOUSANDS/ΜL (ref 0.6–4.47)
LYMPHOCYTES NFR BLD AUTO: 13 % (ref 14–44)
MCH RBC QN AUTO: 29.6 PG (ref 26.8–34.3)
MCHC RBC AUTO-ENTMCNC: 30.5 G/DL (ref 31.4–37.4)
MCV RBC AUTO: 97 FL (ref 82–98)
MONOCYTES # BLD AUTO: 0.92 THOUSAND/ΜL (ref 0.17–1.22)
MONOCYTES NFR BLD AUTO: 9 % (ref 4–12)
NEUTROPHILS # BLD AUTO: 7.53 THOUSANDS/ΜL (ref 1.85–7.62)
NEUTS SEG NFR BLD AUTO: 76 % (ref 43–75)
NRBC BLD AUTO-RTO: 0 /100 WBCS
PLATELET # BLD AUTO: 319 THOUSANDS/UL (ref 149–390)
PMV BLD AUTO: 10 FL (ref 8.9–12.7)
POTASSIUM SERPL-SCNC: 3.4 MMOL/L (ref 3.5–5.3)
RBC # BLD AUTO: 2.87 MILLION/UL (ref 3.81–5.12)
SODIUM SERPL-SCNC: 145 MMOL/L (ref 136–145)
WBC # BLD AUTO: 9.97 THOUSAND/UL (ref 4.31–10.16)

## 2022-06-10 PROCEDURE — 99232 SBSQ HOSP IP/OBS MODERATE 35: CPT | Performed by: INTERNAL MEDICINE

## 2022-06-10 PROCEDURE — 85025 COMPLETE CBC W/AUTO DIFF WBC: CPT | Performed by: INTERNAL MEDICINE

## 2022-06-10 PROCEDURE — 80048 BASIC METABOLIC PNL TOTAL CA: CPT | Performed by: INTERNAL MEDICINE

## 2022-06-10 RX ORDER — POTASSIUM CHLORIDE 14.9 MG/ML
20 INJECTION INTRAVENOUS ONCE
Status: COMPLETED | OUTPATIENT
Start: 2022-06-10 | End: 2022-06-10

## 2022-06-10 RX ORDER — HYDRALAZINE HYDROCHLORIDE 20 MG/ML
10 INJECTION INTRAMUSCULAR; INTRAVENOUS EVERY 6 HOURS PRN
Status: DISCONTINUED | OUTPATIENT
Start: 2022-06-10 | End: 2022-06-11 | Stop reason: HOSPADM

## 2022-06-10 RX ADMIN — NYSTATIN: 100000 CREAM TOPICAL at 08:42

## 2022-06-10 RX ADMIN — POTASSIUM CHLORIDE 20 MEQ: 14.9 INJECTION, SOLUTION INTRAVENOUS at 11:34

## 2022-06-10 RX ADMIN — NYSTATIN: 100000 CREAM TOPICAL at 16:44

## 2022-06-10 RX ADMIN — CEFTRIAXONE 1000 MG: 1 INJECTION, SOLUTION INTRAVENOUS at 13:43

## 2022-06-10 NOTE — ASSESSMENT & PLAN NOTE
Patient diagnosed with CVA  On stroke pathway  Neurology consult appreciated  CT head and CTA-showed New right BRITTNEY distribution infarct in the paramedian right posterior frontal/parietal lobes  On telemetry  Hold Toprol-XL  P T /OT/ST  HB A1c and lipid panel reviewed  Hold aspirin and Plavix in setting of GI bleed  As per GI, if hemoglobin is stable can start on Plavix  Speech and swallow evaluation appreciated  MRI brain results as above  EEG results reviewed  Neurology follow-up  Patient was seen by speech specialist and they recommend starting on dysphagia pureed diet with nectar thick liquids  Will start on Plavix and Lipitor  Discussed with daughter at length and she feels that family would not want to go ahead with tube feeding  Palliative care consult  Patient daughter does not want any feeding tube    Is going to discuss with family regarding transitioning to hospice care

## 2022-06-10 NOTE — ASSESSMENT & PLAN NOTE
Gi consult appreciated  Status post packed red cell transfusion  Hemoglobin is 8 5  No active bleeding overnight  On Protonix  Hold off on endoscopy as per GI

## 2022-06-10 NOTE — CASE MANAGEMENT
Case Management Discharge Planning Note    Patient name Kenneth Kessler  Location /-18 MRN [id number]  : 6/10/1921 Date 6/10/2022       Current Admission Date: 6/3/2022  Current Admission Diagnosis:Sepsis [facility])   Patient Active Problem List    Diagnosis Date Noted    Unresponsive episode     CVA (cerebral vascular accident) ([address] ) 2022    Acute blood loss anemia 2022    Cystitis 2022    Acute diverticulitis 2022    Rectal bleeding 2022    Pneumonia 2019    Sepsis ([address] ) 2019    Hyponatremia 2019    Hypercalcemia 2019    Encephalopathy, metabolic     Unsteady gait 2019    Essential hypertension 2018    Mild intermittent asthma without complication     Minor depression 2018    Gastroesophageal reflux disease without esophagitis 2018      LOS (days): 7  Geometric Mean LOS (GMLOS) (days): 4 80  Days to GMLOS:-2 2     OBJECTIVE:  Risk of Unplanned Readmission Score: 11 62         Current admission status: Inpatient   Preferred Pharmacy:   [address]  [address] [address]  Phone: [phone] Fax: [phone] [address] [place] [address]  Phone: [phone] Fax: [phone]    Primary Care Provider: Colette Murrell MD    Primary Insurance: MEDICARE  Secondary Insurance:     DISCHARGE DETAILS:  Continuing to follow patient  Met with patient's daughter, Ching Pierce , she has  decided to have patient return to [facility] with Hospice  Left MOM for Isela Saul at [facility] to arrange [employer] to be contacted once at [facility]  Request for BLS transport was submitted to  Support through [facility], wait transport time

## 2022-06-10 NOTE — ASSESSMENT & PLAN NOTE
Most likely diverticular bleed source-  GI input appreciated  Status post packed red cell transfusion  Hemoglobin is 8 5  On Protonix  Continue to trend

## 2022-06-10 NOTE — PROGRESS NOTES
[place]  Progress Note Lenny Barajas Dornsife 6/10/1921, [age] y o  female MRN: [id number]  Unit/Bed#: -01 Encounter: [id number]  Primary Care Provider: Ranjeet Mendoza MD   Date and time admitted to hospital: 6/3/2022 12:56 PM    CVA (cerebral vascular accident) [facility])  Assessment & Plan  Patient diagnosed with CVA  On stroke pathway  Neurology consult appreciated  CT head and CTA-showed New right BRITTNEY distribution infarct in the paramedian right posterior frontal/parietal lobes  On telemetry  Hold Toprol-XL  P T /OT/ST  HB A1c and lipid panel reviewed  Hold aspirin and Plavix in setting of GI bleed  As per GI, if hemoglobin is stable can start on Plavix  Speech and swallow evaluation appreciated  MRI brain results as above  EEG results reviewed  Neurology follow-up  Patient was seen by speech specialist and they recommend starting on dysphagia pureed diet with nectar thick liquids  Will start on Plavix and Lipitor  Discussed with daughter at length and she feels that family would not want to go ahead with tube feeding  Palliative care consult  Patient daughter does not want any feeding tube    Is going to discuss with family regarding transitioning to hospice care    Acute blood loss anemia  Assessment & Plan  Most likely diverticular bleed source-  GI input appreciated  Status post packed red cell transfusion  Hemoglobin is 8 5  On Protonix  Continue to trend    Rectal bleeding  Assessment & Plan  Gi consult appreciated  Status post packed red cell transfusion  Hemoglobin is 8 5  No active bleeding overnight  On Protonix  Hold off on endoscopy as per GI    Acute diverticulitis  Assessment & Plan  Noted on ct- will -continue antibioitcs   GI input appreciated felt to be diverticular bleed but no evidence of perforation   Likely source of GI bleed  GI on board    Cystitis  Assessment & Plan  Urine culture with greater than 100,000 colonies of E coli  Continue Rocephin  Trend WBC and fever curve    Encephalopathy, metabolic  Assessment & Plan  More lethargic on day prior to admission and then had an episode of unresponsiveness on a m  Of admission at assisted living facility  In setting of CVA and sepsis secondary to UTI  Neurology on board  Stroke workup ongoing  MRI brain showed-Dense right anterior cerebral artery distribution nonhemorrhagic ischemia in keeping with right A3 anterior cerebral artery occlusion identified on CT angiography of June 6, 2022  Tiny focus of acute ischemia in the posterior inferomedial left frontal lobe  EEG  results are reviewed                                 Gastroesophageal reflux disease without esophagitis  Assessment & Plan  On Protonix    Mild intermittent asthma without complication  Assessment & Plan  Patient without signs of sob- contintinue usual meds    * Sepsis ([facility] [address] )  Assessment & Plan  Patient with fever and altered mental status in setting  also acute cystitis with hematuria   elevated lactic aci level -on presentation  being given 2nd iv bolus by Er staff  Blood pressure stabilized  1/2 blood cultures are positive for coagulase-negative Staph-likely contaminant  Repeat blood cultures are negative to date      Labs & Imaging: I have personally reviewed pertinent reports  VTE Prophylaxis: in place  Code Status:   Level 3 - DNAR and DNI    Patient Centered Rounds: I have performed bedside rounds with nursing staff today  Discussions with Specialists or Other Care Team Provider: CM    Education and Discussions with Family / Patient:  Daughter Jc Rosario    Current Length of Stay: 7 day(s)    Current Patient Status: Inpatient   Certification Statement: The patient will continue to require additional inpatient hospital stay due to see my assessment and plan  Subjective:   Patient is seen and examined at bedside  No new symptoms  Afebrile    Has right gaze preference and left-sided weakness    Objective:    Vitals: Blood pressure (!) 171/104, pulse (!) 128, temperature 98 5 °F (36 9 °C), resp  rate 18, height 5' 4" (1 626 m), weight 57 5 kg (126 lb 12 2 oz), SpO2 93 %, not currently breastfeeding  ,Body mass index is 21 76 kg/m²  SPO2 RA Rest    Flowsheet Row ED to Hosp-Admission (Current) from 6/3/2022 in [address] Med Surg Unit   SpO2 93 %   SpO2 Activity At Rest   O2 Device None (Room air)   O2 Flow Rate --        I&O:     Intake/Output Summary (Last 24 hours) at 6/10/2022 0707  Last data filed at 6/10/2022 0601  Gross per 24 hour   Intake --   Output 1800 ml   Net -1800 ml       Physical Exam:    General- lying comfortably in bed  Not in any acute distress  Neck- Supple, No JVD  CVS- regular, S1 and S2 normal  Chest- Bilateral Air entry, No rhochi, crackles or wheezing present  Abdomen- soft, nontender, not distended, no guarding or rigidity, BS+  Extremities-  No pedal edema, No calf tenderness  CNS-   Alert, awake    Left-sided weakness, dysarthria     Invasive Devices  Report    Peripheral Intravenous Line  Duration           Peripheral IV 06/06/22 Left Antecubital 3 days          Drain  Duration           Urethral Catheter Temperature probe 6 days                      Social History  reviewed  Family History   Problem Relation Age of Onset    No Known Problems Mother     No Known Problems Father     reviewed    Meds:  Current Facility-Administered Medications   Medication Dose Route Frequency Provider Last Rate Last Admin    albuterol (PROVENTIL HFA,VENTOLIN HFA) inhaler 2 puff  2 puff Inhalation Q4H PRN Yee Fly, DO        aluminum-magnesium hydroxide-simethicone (MYLANTA) oral suspension 30 mL  30 mL Oral Q6H PRN Yee Fly, DO        atorvastatin (LIPITOR) tablet 40 mg  40 mg Oral Daily With MARSHALL Martinez MD        brinzolamide (AZOPT) 1 % ophthalmic suspension 1 drop  1 drop Both Eyes HS Yee Fly, DO   1 drop at 06/09/22 2106    cefTRIAXone (ROCEPHIN) IVPB (premix in dextrose) 1,000 mg 50 mL  1,000 mg Intravenous Daily Yee Fly,  mL/hr at 06/09/22 1202 1,000 mg at 06/09/22 1202    clopidogrel (PLAVIX) tablet 75 mg  75 mg Oral Daily Rene Tabares MD   75 mg at 06/09/22 1202    nystatin (MYCOSTATIN) cream   Topical BID Fany Magaña DO   Given at 06/09/22 1828    ondansetron (ZOFRAN) injection 4 mg  4 mg Intravenous Q6H PRN Fany Magaña DO          Medications Prior to Admission   Medication    albuterol (PROAIR HFA) 90 mcg/act inhaler    brinzolamide (AZOPT) 1 % ophthalmic suspension    losartan (COZAAR) 100 MG tablet    metoprolol succinate (TOPROL-XL) 100 mg 24 hr tablet    nystatin (MYCOSTATIN) cream    omeprazole (PriLOSEC) 20 mg delayed release capsule    PARoxetine (PAXIL) 30 mg tablet       Labs:  Results from last 7 days   Lab Units 06/10/22  0553 06/09/22  0457 06/08/22  0330   WBC Thousand/uL 9 97 12 49* 9 34   HEMOGLOBIN g/dL 8 5* 8 2* 7 8*   HEMATOCRIT % 27 9* 26 6* 24 1*   PLATELETS Thousands/uL 319 273 227   NEUTROS PCT % 76* 89* 77*   LYMPHS PCT % 13* 5* 12*   MONOS PCT % 9 6 9   EOS PCT % 1 0 1     Results from last 7 days   Lab Units 06/09/22  0457 06/08/22  0330 06/07/22  1315 06/06/22  0935 06/06/22  0929 06/06/22  0336 06/05/22  0447 06/04/22  0254   POTASSIUM mmol/L 3 3* 3 2* 3 5   < >  --  4 0 3 4* 4 1   CHLORIDE mmol/L 111* 114* 114*   < >  --  113* 115* 110*   CO2 mmol/L 24 25 22   < >  --  24 23 23   CO2, I-STAT mmol/L  --   --   --   --  22  --   --   --    BUN mg/dL 13 14 16   < >  --  25 19 20   CREATININE mg/dL 0 55* 0 62 0 69   < >  --  0 80 0 81 0 77   CALCIUM mg/dL 9 0 8 8 9 1   < >  --  8 4 8 3 8 1*   ALK PHOS U/L  --   --   --   --   --  48 54 53   ALT U/L  --   --   --   --   --  10* 9* 10*   AST U/L  --   --   --   --   --  19 24 16   GLUCOSE, ISTAT mg/dl  --   --   --   --  121  --   --   --     < > = values in this interval not displayed       Lab Results   Component Value Date    TROPONINI <0 02 12/08/2019    TROPONINI <0 02 11/17/2019    TROPONINI <0 02 11/12/2019     Results from last 7 days   Lab Units 06/06/22  0935 06/03/22  1311   INR  1 15 1 06     Lab Results   Component Value Date    BLOODCX No Growth After 4 Days  06/05/2022    BLOODCX No Growth After 4 Days  06/05/2022    BLOODCX No Growth After 5 Days  06/03/2022    BLOODCX Staphylococcus coagulase negative (A) 06/03/2022    URINECX >100,000 cfu/ml Escherichia coli (A) 06/03/2022    URINECX 40,000-49,000 cfu/ml  06/03/2022    URINECX >100,000 cfu/ml Escherichia coli (A) 12/08/2019    URINECX 60,000-69,000 cfu/ml  12/08/2019    SPUTUMCULTUR 3+ Growth of  11/17/2019    SPUTUMCULTUR  11/17/2019     Commensal respiratory mery only; No significant growth of Staph aureus/MRSA or Pseudomonas aeruginosa  Imaging:  No results found for this or any previous visit  Results for orders placed during the hospital encounter of 12/08/19    XR chest 2 views    Narrative  CHEST    INDICATION:   leukocytosis, HTN     COMPARISON:  11/17/2019    EXAM PERFORMED/VIEWS:  XR CHEST PA & LATERAL  Images: 2    FINDINGS:    Cardiomediastinal silhouette appears enlarged  The pulmonary vessels are normal     Previously described parenchymal density at the left lung base has resolved  There are no infiltrates  No pneumothorax or pleural effusion  S-shaped scoliosis  Impression  No acute cardiopulmonary disease  Cardiomegaly          Workstation performed: [id number]      Last 24 Hours Medication List:   Current Facility-Administered Medications   Medication Dose Route Frequency Provider Last Rate    albuterol  2 puff Inhalation Q4H PRN Yee Fly, DO      aluminum-magnesium hydroxide-simethicone  30 mL Oral Q6H PRN Des Hadley DO      atorvastatin  40 mg Oral Daily With MARSHALL Martinez MD      brinzolamide  1 drop Both Eyes HS Yee Fly, DO      cefTRIAXone  1,000 mg Intravenous Daily Yee Fly, DO 1,000 mg (06/09/22 1202)    clopidogrel  75 mg Oral Daily Srini Chacon MD      nystatin   Topical BID Yee Aldrich DO      ondansetron  4 mg Intravenous Q6H PRN DO Yue          Today, Patient Was Seen By: Yee Mir MD    ** Please Note: Dictation voice to text software may have been used in the creation of this document   **

## 2022-06-10 NOTE — PLAN OF CARE
Problem: Potential for Falls  Goal: Patient will remain free of falls  Description: INTERVENTIONS:  - Educate patient/family on patient safety including physical limitations  - Instruct patient to call for assistance with activity   - Consult OT/PT to assist with strengthening/mobility   - Keep Call bell within reach  - Keep bed low and locked with side rails adjusted as appropriate  - Keep care items and personal belongings within reach  - Initiate and maintain comfort rounds  - Make Fall Risk Sign visible to staff  - Initiate/Maintain bed alarm  - Obtain necessary fall risk management equipment: bed alrm  - Apply yellow socks and bracelet for high fall risk patients  - Consider moving patient to room near nurses station  Outcome: Progressing

## 2022-06-10 NOTE — PLAN OF CARE
Problem: Potential for Falls  Goal: Patient will remain free of falls  Description: INTERVENTIONS:  - Educate patient/family on patient safety including physical limitations  - Instruct patient to call for assistance with activity   - Consult OT/PT to assist with strengthening/mobility   - Keep Call bell within reach  - Keep bed low and locked with side rails adjusted as appropriate  - Keep care items and personal belongings within reach  - Initiate and maintain comfort rounds  - Make Fall Risk Sign visible to staff  - Offer Toileting every 2 Hours, in advance of need  - Initiate/Maintain bed alarm  - Obtain necessary fall risk management equipment:   - Apply yellow socks and bracelet for high fall risk patients  - Consider moving patient to room near nurses station  Outcome: Progressing     Problem: GASTROINTESTINAL - ADULT  Goal: Minimal or absence of nausea and/or vomiting  Description: INTERVENTIONS:  - Administer IV fluids if ordered to ensure adequate hydration  - Maintain NPO status until nausea and vomiting are resolved  - Nasogastric tube if ordered  - Administer ordered antiemetic medications as needed  - Provide nonpharmacologic comfort measures as appropriate  - Advance diet as tolerated, if ordered  - Consider nutrition services referral to assist patient with adequate nutrition and appropriate food choices  Outcome: Progressing  Goal: Maintains or returns to baseline bowel function  Description: INTERVENTIONS:  - Assess bowel function  - Encourage oral fluids to ensure adequate hydration  - Administer IV fluids if ordered to ensure adequate hydration  - Administer ordered medications as needed  - Encourage mobilization and activity  - Consider nutritional services referral to assist patient with adequate nutrition and appropriate food choices  Outcome: Progressing  Goal: Maintains adequate nutritional intake  Description: INTERVENTIONS:  - Monitor percentage of each meal consumed  - Identify factors contributing to decreased intake, treat as appropriate  - Assist with meals as needed  - Monitor I&O, weight, and lab values if indicated  - Obtain nutrition services referral as needed  Outcome: Progressing  Goal: Establish and maintain optimal ostomy function  Description: INTERVENTIONS:  - Assess bowel function  - Encourage oral fluids to ensure adequate hydration  - Administer IV fluids if ordered to ensure adequate hydration   - Administer ordered medications as needed  - Encourage mobilization and activity  - Nutrition services referral to assist patient with appropriate food choices  - Assess stoma site  - Consider wound care consult   Outcome: Progressing  Goal: Oral mucous membranes remain intact  Description: INTERVENTIONS  - Assess oral mucosa and hygiene practices  - Implement preventative oral hygiene regimen  - Implement oral medicated treatments as ordered  - Initiate Nutrition services referral as needed  Outcome: Progressing     Problem: GENITOURINARY - ADULT  Goal: Maintains or returns to baseline urinary function  Description: INTERVENTIONS:  - Assess urinary function  - Encourage oral fluids to ensure adequate hydration if ordered  - Administer IV fluids as ordered to ensure adequate hydration  - Administer ordered medications as needed  - Offer frequent toileting  - Follow urinary retention protocol if ordered  Outcome: Progressing  Goal: Absence of urinary retention  Description: INTERVENTIONS:  - Assess patients ability to void and empty bladder  - Monitor I/O  - Bladder scan as needed  - Discuss with physician/AP medications to alleviate retention as needed  - Discuss catheterization for long term situations as appropriate  Outcome: Progressing  Goal: Urinary catheter remains patent  Description: INTERVENTIONS:  - Assess patency of urinary catheter  - If patient has a chronic edwards, consider changing catheter if non-functioning  - Follow guidelines for intermittent irrigation of non-functioning urinary catheter  Outcome: Progressing     Problem: METABOLIC, FLUID AND ELECTROLYTES - ADULT  Goal: Electrolytes maintained within normal limits  Description: INTERVENTIONS:  - Monitor labs and assess patient for signs and symptoms of electrolyte imbalances  - Administer electrolyte replacement as ordered  - Monitor response to electrolyte replacements, including repeat lab results as appropriate  - Instruct patient on fluid and nutrition as appropriate  Outcome: Progressing  Goal: Fluid balance maintained  Description: INTERVENTIONS:  - Monitor labs   - Monitor I/O and WT  - Instruct patient on fluid and nutrition as appropriate  - Assess for signs & symptoms of volume excess or deficit  Outcome: Progressing  Goal: Glucose maintained within target range  Description: INTERVENTIONS:  - Monitor Blood Glucose as ordered  - Assess for signs and symptoms of hyperglycemia and hypoglycemia  - Administer ordered medications to maintain glucose within target range  - Assess nutritional intake and initiate nutrition service referral as needed  Outcome: Progressing     Problem: SKIN/TISSUE INTEGRITY - ADULT  Goal: Skin Integrity remains intact(Skin Breakdown Prevention)  Description: Assess:  -Perform Miguel assessment every   -Clean and moisturize skin every   -Inspect skin when repositioning, toileting, and assisting with ADLS  -Assess under medical devices such as  every   -Assess extremities for adequate circulation and sensation     Bed Management:  -Have minimal linens on bed & keep smooth, unwrinkled  -Change linens as needed when moist or perspiring  -Avoid sitting or lying in one position for more than  hours while in bed  -Keep HOB at degrees     Toileting:  -Offer bedside commode  -Assess for incontinence every   -Use incontinent care products after each incontinent episode such as     Activity:  -Mobilize patient  times a day  -Encourage activity and walks on unit  -Encourage or provide ROM exercises   -Turn and reposition patient every  Hours  -Use appropriate equipment to lift or move patient in bed  -Instruct/ Assist with weight shifting every  when out of bed in chair  -Consider limitation of chair time  hour intervals    Skin Care:  -Avoid use of baby powder, tape, friction and shearing, hot water or constrictive clothing  -Relieve pressure over bony prominences using   -Do not massage red bony areas    Next Steps:  -Teach patient strategies to minimize risks such as    -Consider consults to  interdisciplinary teams such as   Outcome: Progressing  Goal: Incision(s), wounds(s) or drain site(s) healing without S/S of infection  Description: INTERVENTIONS  - Assess and document dressing, incision, wound bed, drain sites and surrounding tissue  - Provide patient and family education  - Perform skin care/dressing changes every   Outcome: Progressing  Goal: Pressure injury heals and does not worsen  Description: Interventions:  - Implement low air loss mattress or specialty surface (Criteria met)  - Apply silicone foam dressing  - Instruct/assist with weight shifting every  minutes when in chair   - Limit chair time to  hour intervals  - Use special pressure reducing interventions such as  when in chair   - Apply fecal or urinary incontinence containment device   - Perform passive or active ROM every   - Turn and reposition patient & offload bony prominences every  hours   - Utilize friction reducing device or surface for transfers   - Consider consults to  interdisciplinary teams such as   - Use incontinent care products after each incontinent episode such as   - Consider nutrition services referral as needed  Outcome: Progressing     Problem: MUSCULOSKELETAL - ADULT  Goal: Maintain or return mobility to safest level of function  Description: INTERVENTIONS:  - Assess patient's ability to carry out ADLs; assess patient's baseline for ADL function and identify physical deficits which impact ability to perform ADLs (bathing, care of mouth/teeth, toileting, grooming, dressing, etc )  - Assess/evaluate cause of self-care deficits   - Assess range of motion  - Assess patient's mobility  - Assess patient's need for assistive devices and provide as appropriate  - Encourage maximum independence but intervene and supervise when necessary  - Involve family in performance of ADLs  - Assess for home care needs following discharge   - Consider OT consult to assist with ADL evaluation and planning for discharge  - Provide patient education as appropriate  Outcome: Progressing  Goal: Maintain proper alignment of affected body part  Description: INTERVENTIONS:  - Support, maintain and protect limb and body alignment  - Provide patient/ family with appropriate education  Outcome: Progressing     Problem: MOBILITY - ADULT  Goal: Maintain or return to baseline ADL function  Description: INTERVENTIONS:  -  Assess patient's ability to carry out ADLs; assess patient's baseline for ADL function and identify physical deficits which impact ability to perform ADLs (bathing, care of mouth/teeth, toileting, grooming, dressing, etc )  - Assess/evaluate cause of self-care deficits   - Assess range of motion  - Assess patient's mobility; develop plan if impaired  - Assess patient's need for assistive devices and provide as appropriate  - Encourage maximum independence but intervene and supervise when necessary  - Involve family in performance of ADLs  - Assess for home care needs following discharge   - Consider OT consult to assist with ADL evaluation and planning for discharge  - Provide patient education as appropriate  Outcome: Progressing  Goal: Maintains/Returns to pre admission functional level  Description: INTERVENTIONS:  - Perform BMAT or MOVE assessment daily    - Set and communicate daily mobility goal to care team and patient/family/caregiver     - Collaborate with rehabilitation services on mobility goals if consulted  - Perform Range of Motion 3 times a day  - Reposition patient every 2 hours  - Dangle patient 3 times a day  - Stand patient 3 times a day  - Ambulate patient 3 times a day  - Out of bed to chair 3 times a day   - Out of bed for meals 3 times a day  - Out of bed for toileting  - Record patient progress and toleration of activity level   Outcome: Progressing     Problem: Prexisting or High Potential for Compromised Skin Integrity  Goal: Skin integrity is maintained or improved  Description: INTERVENTIONS:  - Identify patients at risk for skin breakdown  - Assess and monitor skin integrity  - Assess and monitor nutrition and hydration status  - Monitor labs   - Assess for incontinence   - Turn and reposition patient  - Assist with mobility/ambulation  - Relieve pressure over bony prominences  - Avoid friction and shearing  - Provide appropriate hygiene as needed including keeping skin clean and dry  - Evaluate need for skin moisturizer/barrier cream  - Collaborate with interdisciplinary team   - Patient/family teaching  - Consider wound care consult   Outcome: Progressing     Problem: HEMATOLOGIC - ADULT  Goal: Maintains hematologic stability  Description: INTERVENTIONS  - Assess for signs and symptoms of bleeding or hemorrhage  - Monitor labs  - Administer supportive blood products/factors as ordered and appropriate  Outcome: Progressing     Problem: Nutrition/Hydration-ADULT  Goal: Nutrient/Hydration intake appropriate for improving, restoring or maintaining nutritional needs  Description: Monitor and assess patient's nutrition/hydration status for malnutrition  Collaborate with interdisciplinary team and initiate plan and interventions as ordered  Monitor patient's weight and dietary intake as ordered or per policy  Utilize nutrition screening tool and intervene as necessary  Determine patient's food preferences and provide high-protein, high-caloric foods as appropriate       INTERVENTIONS:  - Monitor oral intake, urinary output, labs, and treatment plans  - Assess nutrition and hydration status and recommend course of action  - Evaluate amount of meals eaten  - Assist patient with eating if necessary   - Allow adequate time for meals  - Recommend/ encourage appropriate diets, oral nutritional supplements, and vitamin/mineral supplements  - Order, calculate, and assess calorie counts as needed  - Recommend, monitor, and adjust tube feedings and TPN/PPN based on assessed needs  - Assess need for intravenous fluids  - Provide specific nutrition/hydration education as appropriate  - Include patient/family/caregiver in decisions related to nutrition  Outcome: Progressing     Problem: Neurological Deficit  Goal: Neurological status is stable or improving  Description: Interventions:  - Monitor and assess patient's level of consciousness, motor function, sensory function, and level of assistance needed for ADLs  - Monitor and report changes from baseline  Collaborate with interdisciplinary team to initiate plan and implement interventions as ordered  - Provide and maintain a safe environment  - Consider seizure precautions  - Consider fall precautions  - Consider aspiration precautions  - Consider bleeding precautions  Outcome: Progressing     Problem: Activity Intolerance/Impaired Mobility  Goal: Mobility/activity is maintained at optimum level for patient  Description: Interventions:  - Assess and monitor patient  barriers to mobility and need for assistive/adaptive devices  - Assess patient's emotional response to limitations  - Collaborate with interdisciplinary team and initiate plans and interventions as ordered  - Encourage independent activity per ability   - Maintain proper body alignment  - Perform active/passive rom as tolerated/ordered    - Plan activities to conserve energy   - Turn patient as appropriate  Outcome: Progressing     Problem: Communication Impairment  Goal: Ability to express needs and understand communication  Description: Assess patient's communication skills and ability to understand information  Patient will demonstrate use of effective communication techniques, alternative methods of communication and understanding even if not able to speak  - Encourage communication and provide alternate methods of communication as needed  - Collaborate with case management/ for discharge needs  - Include patient/family/caregiver in decisions related to communication  Outcome: Progressing     Problem: Potential for Aspiration  Goal: Non-ventilated patient's risk of aspiration is minimized  Description: Assess and monitor vital signs, respiratory status, and labs (WBC)  Monitor for signs of aspiration (tachypnea, cough, rales, wheezing, cyanosis, fever)  - Assess and monitor patient's ability to swallow  - Place patient up in chair to eat if possible  - HOB up at 90 degrees to eat if unable to get patient up into chair   - Supervise patient during oral intake  - Instruct patient/ family to take small bites  - Instruct patient/ family to take small single sips when taking liquids  - Follow patient-specific strategies generated by speech pathologist   Outcome: Progressing     Problem: Nutrition  Goal: Nutrition/Hydration status is improving  Description: Monitor and assess patient's nutrition/hydration status for malnutrition (ex- brittle hair, bruises, dry skin, pale skin and conjunctiva, muscle wasting, smooth red tongue, and disorientation)  Collaborate with interdisciplinary team and initiate plan and interventions as ordered  Monitor patient's weight and dietary intake as ordered or per policy  Utilize nutrition screening tool and intervene per policy  Determine patient's food preferences and provide high-protein, high-caloric foods as appropriate  - Assist patient with eating   - Allow adequate time for meals   - Encourage patient to take dietary supplement as ordered    - Collaborate with clinical nutritionist   - Include patient/family/caregiver in decisions related to nutrition    Outcome: Progressing Azithromycin Counseling:  I discussed with the patient the risks of azithromycin including but not limited to GI upset, allergic reaction, drug rash, diarrhea, and yeast infections.

## 2022-06-11 VITALS
RESPIRATION RATE: 16 BRPM | SYSTOLIC BLOOD PRESSURE: 135 MMHG | HEIGHT: 64 IN | DIASTOLIC BLOOD PRESSURE: 74 MMHG | BODY MASS INDEX: 21.64 KG/M2 | HEART RATE: 70 BPM | WEIGHT: 126.76 LBS | TEMPERATURE: 98 F | OXYGEN SATURATION: 94 %

## 2022-06-11 LAB
ANION GAP SERPL CALCULATED.3IONS-SCNC: 10 MMOL/L (ref 4–13)
BASOPHILS # BLD AUTO: 0.02 THOUSANDS/ΜL (ref 0–0.1)
BASOPHILS NFR BLD AUTO: 0 % (ref 0–1)
BUN SERPL-MCNC: 14 MG/DL (ref 5–25)
CALCIUM SERPL-MCNC: 9.6 MG/DL (ref 8.3–10.1)
CHLORIDE SERPL-SCNC: 112 MMOL/L (ref 100–108)
CO2 SERPL-SCNC: 25 MMOL/L (ref 21–32)
CREAT SERPL-MCNC: 0.54 MG/DL (ref 0.6–1.3)
EOSINOPHIL # BLD AUTO: 0.13 THOUSAND/ΜL (ref 0–0.61)
EOSINOPHIL NFR BLD AUTO: 2 % (ref 0–6)
ERYTHROCYTE [DISTWIDTH] IN BLOOD BY AUTOMATED COUNT: 17.3 % (ref 11.6–15.1)
FLUAV RNA RESP QL NAA+PROBE: NEGATIVE
FLUBV RNA RESP QL NAA+PROBE: NEGATIVE
GFR SERPL CREATININE-BSD FRML MDRD: 77 ML/MIN/1.73SQ M
GLUCOSE SERPL-MCNC: 109 MG/DL (ref 65–140)
HCT VFR BLD AUTO: 28.5 % (ref 34.8–46.1)
HGB BLD-MCNC: 8.6 G/DL (ref 11.5–15.4)
IMM GRANULOCYTES # BLD AUTO: 0.03 THOUSAND/UL (ref 0–0.2)
IMM GRANULOCYTES NFR BLD AUTO: 0 % (ref 0–2)
LYMPHOCYTES # BLD AUTO: 0.95 THOUSANDS/ΜL (ref 0.6–4.47)
LYMPHOCYTES NFR BLD AUTO: 11 % (ref 14–44)
MCH RBC QN AUTO: 29.8 PG (ref 26.8–34.3)
MCHC RBC AUTO-ENTMCNC: 30.2 G/DL (ref 31.4–37.4)
MCV RBC AUTO: 99 FL (ref 82–98)
MONOCYTES # BLD AUTO: 0.64 THOUSAND/ΜL (ref 0.17–1.22)
MONOCYTES NFR BLD AUTO: 7 % (ref 4–12)
NEUTROPHILS # BLD AUTO: 6.91 THOUSANDS/ΜL (ref 1.85–7.62)
NEUTS SEG NFR BLD AUTO: 80 % (ref 43–75)
NRBC BLD AUTO-RTO: 0 /100 WBCS
PLATELET # BLD AUTO: 365 THOUSANDS/UL (ref 149–390)
PMV BLD AUTO: 10 FL (ref 8.9–12.7)
POTASSIUM SERPL-SCNC: 3.5 MMOL/L (ref 3.5–5.3)
RBC # BLD AUTO: 2.89 MILLION/UL (ref 3.81–5.12)
RSV RNA RESP QL NAA+PROBE: NEGATIVE
SARS-COV-2 RNA RESP QL NAA+PROBE: NEGATIVE
SODIUM SERPL-SCNC: 147 MMOL/L (ref 136–145)
WBC # BLD AUTO: 8.68 THOUSAND/UL (ref 4.31–10.16)

## 2022-06-11 PROCEDURE — 85025 COMPLETE CBC W/AUTO DIFF WBC: CPT | Performed by: INTERNAL MEDICINE

## 2022-06-11 PROCEDURE — 0241U HB NFCT DS VIR RESP RNA 4 TRGT: CPT | Performed by: INTERNAL MEDICINE

## 2022-06-11 PROCEDURE — 99239 HOSP IP/OBS DSCHRG MGMT >30: CPT | Performed by: INTERNAL MEDICINE

## 2022-06-11 PROCEDURE — 80048 BASIC METABOLIC PNL TOTAL CA: CPT | Performed by: INTERNAL MEDICINE

## 2022-06-11 RX ORDER — CLOPIDOGREL BISULFATE 75 MG/1
75 TABLET ORAL DAILY
Refills: 0
Start: 2022-06-12

## 2022-06-11 RX ORDER — ATORVASTATIN CALCIUM 40 MG/1
40 TABLET, FILM COATED ORAL
Refills: 0
Start: 2022-06-11

## 2022-06-11 RX ADMIN — NYSTATIN 1 APPLICATION: 100000 CREAM TOPICAL at 08:25

## 2022-06-11 NOTE — ASSESSMENT & PLAN NOTE
Patient with fever and altered mental status in setting  also acute cystitis with hematuria   elevated lactic aci level -on presentation  being given 2nd iv bolus by Er staff  Blood pressure stabilized  1/2 blood cultures are positive for coagulase-negative Staph-likely contaminant  Repeat blood cultures are negative

## 2022-06-11 NOTE — DISCHARGE SUMMARY
[place]  Discharge- Fox Newman 6/10/1921, 80 y o  female MRN: [id number]  Unit/Bed#: -01 Encounter: [id number]  Primary Care Provider: Leigha Hendrix MD   Date and time admitted to hospital: 6/3/2022 12:56 PM    CVA (cerebral vascular accident) [facility])  Assessment & Plan  Patient diagnosed with CVA  On stroke pathway  Neurology consult appreciated  CT head and CTA-showed New right BRITTNEY distribution infarct in the paramedian right posterior frontal/parietal lobes  On telemetry  Hold Toprol-XL  P T /OT/ST  HB A1c and lipid panel reviewed  Hold aspirin and Plavix in setting of GI bleed  As per GI, if hemoglobin is stable can start on Plavix  Speech and swallow evaluation appreciated  MRI brain results as above  EEG results reviewed  Neurology follow-up  Patient was seen by speech specialist and they recommend starting on dysphagia pureed diet with nectar thick liquids  Continue on Plavix and Lipitor  Discussed with daughter at length and she feels that family would not want to go ahead with tube feeding  Palliative care consult appreciated  Patient 's daughter does not want any feeding tube    Daughter discussed with family, palliative care and  and opted for discharge to skilled nursing rehab with hospice care    Acute blood loss anemia  Assessment & Plan  Most likely diverticular bleed source-  GI input appreciated  Status post packed red cell transfusion  Hemoglobin is 8 6  On Protonix  Continue to trend    Rectal bleeding  Assessment & Plan  Gi consult appreciated  Status post packed red cell transfusion  Hemoglobin is 8 6  No active bleeding overnight  On Protonix  Hold off on endoscopy as per GI    Acute diverticulitis  Assessment & Plan  Noted on ct- will -continue antibioitcs   GI input appreciated felt to be diverticular bleed but no evidence of perforation   Likely source of GI bleed  GI on board    Cystitis  Assessment & Plan  Urine culture with greater than 100,000 colonies of E coli  Completed the course of Rocephin      Encephalopathy, metabolic  Assessment & Plan  More lethargic on day prior to admission and then had an episode of unresponsiveness on a m  Of admission at assisted living facility  In setting of CVA and sepsis secondary to UTI  Neurology on board  Stroke workup ongoing  MRI brain showed-Dense right anterior cerebral artery distribution nonhemorrhagic ischemia in keeping with right A3 anterior cerebral artery occlusion identified on CT angiography of June 6, 2022  Tiny focus of acute ischemia in the posterior inferomedial left frontal lobe  EEG  results are reviewed    Patient will be discharged to skilled nursing rehab with hospice care  Case Management on board                               Gastroesophageal reflux disease without esophagitis  Assessment & Plan  On Protonix    Mild intermittent asthma without complication  Assessment & Plan  Patient without signs of sob- contintinue usual meds    * Sepsis ([address] )  Assessment & Plan  Patient with fever and altered mental status in setting  also acute cystitis with hematuria   elevated lactic aci level -on presentation  being given 2nd iv bolus by Er staff  Blood pressure stabilized  1/2 blood cultures are positive for coagulase-negative Staph-likely contaminant  Repeat blood cultures are negative       Hospital Course:     Devyn Magallanes is a 80 y o  female patient who originally presented to the hospital on   Admission Orders (From admission, onward)     Ordered        06/03/22 1525  Inpatient Admission  Once                     due to rectal bleeding  Patient was admitted with sepsis secondary to UTI, acute metabolic encephalopathy and rectal bleeding  Patient was seen by GI   CT scan does not show diverticulitis  Patient was started on Protonix  GI recommended continue to trend H&H and transfuse as needed    They recommended to hold off on endoscopic evaluation given her advanced age and comorbidities  Patient blood cultures grew 1/2 bottles coagulase-negative staph which is a contaminant     Repeat cultures are negative to date  Patient urine culture grew E coli  Patient was treated with Rocephin and completed the course  Patient was given packed red cell transfusion during this admission  Patient had a rapid response and was started on stroke pathway  Patient was seen by Neurology and CT head and MRI brain were done  Patient did have acute CVA  Patient was seen by speech, P T /OT  Hemoglobin remained stable  Patient was recommended dysphagia 1 pureed diet with honey thickened liquids  Patient is had high risk for aspiration  Discussed with daughter and palliative care was consulted  Daughter and family did not want tube feedings for the patient  Daughter after discussion with palliative care opted to transition the care to hospice  Patient will be discharged to [facility] [place] [place] skilled [facility] rehab with hospice  Discussed with patient and she is in agreement with the discharge plan  On exam  Chest-clear to auscultation  Heart-S1-S2 regular  Abdomen-soft, nontender  Neuro-alert, awake, left-sided weakness with dysarthria and right-sided gaze preference    Please see above list of diagnoses and related plan for additional information  Follow-up with PCP in 1 week  Continue care as per hospice team  Repeat speech and swallow evaluation at nursing facility  Return to ER with any worsening shortness of breath, chest pain, fever, chills or any other alarming symptoms    Condition at Discharge:  good      Discharge instructions/Information to patient and family:   See after visit summary for information provided to patient and family  Provisions for Follow-Up Care:  See after visit summary for information related to follow-up care and any pertinent home health orders        Disposition:     Laly Manuel at [facility] [place] [place] skilled [facility] rehab with hospice       Discharge Statement:  I spent 45 minutes discharging the patient  This time was spent on the day of discharge  I had direct contact with the patient on the day of discharge  Greater than 50% of the total time was spent examining patient, answering all patient questions, arranging and discussing plan of care with patient as well as directly providing post-discharge instructions  Additional time then spent on discharge activities  Discharge Medications:  See after visit summary for reconciled discharge medications provided to patient and family        ** Please Note: This note has been constructed using a voice recognition system **

## 2022-06-11 NOTE — CASE MANAGEMENT
Case Management Progress Note    Patient name Atul Méndez  Location /-23 MRN [id number]  : 6/10/1921 Date 2022       LOS (days): 8  Geometric Mean LOS (GMLOS) (days): 4 80  Days to GMLOS:-3        OBJECTIVE:        Current admission status: Inpatient  Preferred Pharmacy:   [address] [address]  Phone: [phone] Fax: [phone] [address]  Phone: [phone] Fax: [phone]    Primary Care Provider: Yamileth Aguilar MD    Primary Insurance: MEDICARE  Secondary Insurance:     PROGRESS NOTE:  Patient is medically cleared for discharge today back to [facility] with [facility] to be set up by facility  [facility] to transport patient today at 1100 via BLS  Called and notified nursing supervisor, Bhupendra Mendez at [facility] and [facility] for patients daughter Stanley Portillo

## 2022-06-11 NOTE — PLAN OF CARE
Problem: Potential for Falls  Goal: Patient will remain free of falls  Description: INTERVENTIONS:  - Educate patient/family on patient safety including physical limitations  - Instruct patient to call for assistance with activity   - Consult OT/PT to assist with strengthening/mobility   - Keep Call bell within reach  - Keep bed low and locked with side rails adjusted as appropriate  - Keep care items and personal belongings within reach  - Initiate and maintain comfort rounds  - Make Fall Risk Sign visible to staff  - Offer Toileting every x Hours, in advance of need  - Initiate/Maintain xalarm  - Obtain necessary fall risk management equipment: x  - Apply yellow socks and bracelet for high fall risk patients  - Consider moving patient to room near nurses station  6/11/2022 1059 by Roslyn Mercer RN  Outcome: Adequate for Discharge  6/11/2022 1059 by Roslyn Mercer RN  Outcome: Adequate for Discharge     Problem: GASTROINTESTINAL - ADULT  Goal: Minimal or absence of nausea and/or vomiting  Description: INTERVENTIONS:  - Administer IV fluids if ordered to ensure adequate hydration  - Maintain NPO status until nausea and vomiting are resolved  - Nasogastric tube if ordered  - Administer ordered antiemetic medications as needed  - Provide nonpharmacologic comfort measures as appropriate  - Advance diet as tolerated, if ordered  - Consider nutrition services referral to assist patient with adequate nutrition and appropriate food choices  6/11/2022 1059 by Roslyn Mercer RN  Outcome: Adequate for Discharge  6/11/2022 1059 by Roslyn Mercer RN  Outcome: Adequate for Discharge  Goal: Maintains or returns to baseline bowel function  Description: INTERVENTIONS:  - Assess bowel function  - Encourage oral fluids to ensure adequate hydration  - Administer IV fluids if ordered to ensure adequate hydration  - Administer ordered medications as needed  - Encourage mobilization and activity  - Consider nutritional services referral to assist patient with adequate nutrition and appropriate food choices  6/11/2022 1059 by Cecy Sánchez RN  Outcome: Adequate for Discharge  6/11/2022 1059 by Cecy Sánchez RN  Outcome: Adequate for Discharge  Goal: Maintains adequate nutritional intake  Description: INTERVENTIONS:  - Monitor percentage of each meal consumed  - Identify factors contributing to decreased intake, treat as appropriate  - Assist with meals as needed  - Monitor I&O, weight, and lab values if indicated  - Obtain nutrition services referral as needed  6/11/2022 1059 by Cecy Sánchez RN  Outcome: Adequate for Discharge  6/11/2022 1059 by Cecy Sánchez RN  Outcome: Adequate for Discharge  Goal: Establish and maintain optimal ostomy function  Description: INTERVENTIONS:  - Assess bowel function  - Encourage oral fluids to ensure adequate hydration  - Administer IV fluids if ordered to ensure adequate hydration   - Administer ordered medications as needed  - Encourage mobilization and activity  - Nutrition services referral to assist patient with appropriate food choices  - Assess stoma site  - Consider wound care consult   6/11/2022 1059 by Cecy Sánchez RN  Outcome: Adequate for Discharge  6/11/2022 1059 by Cecy Sánchez RN  Outcome: Adequate for Discharge  Goal: Oral mucous membranes remain intact  Description: INTERVENTIONS  - Assess oral mucosa and hygiene practices  - Implement preventative oral hygiene regimen  - Implement oral medicated treatments as ordered  - Initiate Nutrition services referral as needed  6/11/2022 1059 by Cecy Sánchez RN  Outcome: Adequate for Discharge  6/11/2022 1059 by Cecy Sánchez RN  Outcome: Adequate for Discharge     Problem: GENITOURINARY - ADULT  Goal: Maintains or returns to baseline urinary function  Description: INTERVENTIONS:  - Assess urinary function  - Encourage oral fluids to ensure adequate hydration if ordered  - Administer IV fluids as ordered to ensure adequate hydration  - Administer ordered medications as needed  - Offer frequent toileting  - Follow urinary retention protocol if ordered  6/11/2022 1059 by Miguel Hoff RN  Outcome: Adequate for Discharge  6/11/2022 1059 by Miguel Hoff RN  Outcome: Adequate for Discharge  Goal: Absence of urinary retention  Description: INTERVENTIONS:  - Assess patients ability to void and empty bladder  - Monitor I/O  - Bladder scan as needed  - Discuss with physician/AP medications to alleviate retention as needed  - Discuss catheterization for long term situations as appropriate  6/11/2022 1059 by Miguel Hoff RN  Outcome: Adequate for Discharge  6/11/2022 1059 by Miguel Hoff RN  Outcome: Adequate for Discharge  Goal: Urinary catheter remains patent  Description: INTERVENTIONS:  - Assess patency of urinary catheter  - If patient has a chronic edwards, consider changing catheter if non-functioning  - Follow guidelines for intermittent irrigation of non-functioning urinary catheter  6/11/2022 1059 by Miguel Hoff RN  Outcome: Adequate for Discharge  6/11/2022 1059 by Miguel Hoff RN  Outcome: Adequate for Discharge     Problem: METABOLIC, FLUID AND ELECTROLYTES - ADULT  Goal: Electrolytes maintained within normal limits  Description: INTERVENTIONS:  - Monitor labs and assess patient for signs and symptoms of electrolyte imbalances  - Administer electrolyte replacement as ordered  - Monitor response to electrolyte replacements, including repeat lab results as appropriate  - Instruct patient on fluid and nutrition as appropriate  6/11/2022 1059 by Miguel Hoff RN  Outcome: Adequate for Discharge  6/11/2022 1059 by Miguel Hoff RN  Outcome: Adequate for Discharge  Goal: Fluid balance maintained  Description: INTERVENTIONS:  - Monitor labs   - Monitor I/O and WT  - Instruct patient on fluid and nutrition as appropriate  - Assess for signs & symptoms of volume excess or deficit  6/11/2022 1059 by Herminia Santana RN  Outcome: Adequate for Discharge  6/11/2022 1059 by Herminia Santana RN  Outcome: Adequate for Discharge  Goal: Glucose maintained within target range  Description: INTERVENTIONS:  - Monitor Blood Glucose as ordered  - Assess for signs and symptoms of hyperglycemia and hypoglycemia  - Administer ordered medications to maintain glucose within target range  - Assess nutritional intake and initiate nutrition service referral as needed  6/11/2022 1059 by Herminia Santana RN  Outcome: Adequate for Discharge  6/11/2022 1059 by Herminia Santana RN  Outcome: Adequate for Discharge     Problem: SKIN/TISSUE INTEGRITY - ADULT  Goal: Skin Integrity remains intact(Skin Breakdown Prevention)  Description: Assess:  -Perform Miguel assessment every x  -Clean and moisturize skin every x  -Inspect skin when repositioning, toileting, and assisting with ADLS  -Assess under medical devices such as x every x  -Assess extremities for adequate circulation and sensation     Bed Management:  -Have minimal linens on bed & keep smooth, unwrinkled  -Change linens as needed when moist or perspiring  -Avoid sitting or lying in one position for more than x hours while in bed  -Keep HOB at [facility]     Toileting:  -Offer bedside commode  -Assess for incontinence every x  -Use incontinent care products after each incontinent episode such as x    Activity:  -Mobilize patient x times a day  -Encourage activity and walks on unit  -Encourage or provide ROM exercises   -Turn and reposition patient every x Hours  -Use appropriate equipment to lift or move patient in bed  -Instruct/ Assist with weight shifting every x when out of bed in chair  -Consider limitation of chair time x hour intervals    Skin Care:  -Avoid use of baby powder, tape, friction and shearing, hot water or constrictive clothing  -Relieve pressure over bony prominences using x  -Do not massage red bony areas    Next Steps:  -Teach patient strategies to minimize risks such as x   -Consider consults to  interdisciplinary teams such as x  6/11/2022 1059 by Kamran Dougherty RN  Outcome: Adequate for Discharge  6/11/2022 1059 by Kamran Dougherty RN  Outcome: Adequate for Discharge  Goal: Incision(s), wounds(s) or drain site(s) healing without S/S of infection  Description: INTERVENTIONS  - Assess and document dressing, incision, wound bed, drain sites and surrounding tissue  - Provide patient and family education  - Perform skin care/dressing changes every x  6/11/2022 1059 by Kamran Dougherty RN  Outcome: Adequate for Discharge  6/11/2022 1059 by Kamran Dougherty RN  Outcome: Adequate for Discharge  Goal: Pressure injury heals and does not worsen  Description: Interventions:  - Implement low air loss mattress or specialty surface (Criteria met)  - Apply silicone foam dressing  - Instruct/assist with weight shifting every x minutes when in chair   - Limit chair time to x hour intervals  - Use special pressure reducing interventions such as x when in chair   - Apply fecal or urinary incontinence containment device   - Perform passive or active ROM every x  - Turn and reposition patient & offload bony prominences every x hours   - Utilize friction reducing device or surface for transfers   - Consider consults to  interdisciplinary teams such as x  - Use incontinent care products after each incontinent episode such as x  - Consider nutrition services referral as needed  6/11/2022 1059 by Kamran Dougherty RN  Outcome: Adequate for Discharge  6/11/2022 1059 by Kamran Dougherty RN  Outcome: Adequate for Discharge     Problem: MUSCULOSKELETAL - ADULT  Goal: Maintain or return mobility to safest level of function  Description: INTERVENTIONS:  - Assess patient's ability to carry out ADLs; assess patient's baseline for ADL function and identify physical deficits which impact ability to perform ADLs (bathing, care of mouth/teeth, toileting, grooming, dressing, etc )  - Assess/evaluate cause of self-care deficits   - Assess range of motion  - Assess patient's mobility  - Assess patient's need for assistive devices and provide as appropriate  - Encourage maximum independence but intervene and supervise when necessary  - Involve family in performance of ADLs  - Assess for home care needs following discharge   - Consider OT consult to assist with ADL evaluation and planning for discharge  - Provide patient education as appropriate  6/11/2022 1059 by Glo Campos RN  Outcome: Adequate for Discharge  6/11/2022 1059 by Glo Campos RN  Outcome: Adequate for Discharge  Goal: Maintain proper alignment of affected body part  Description: INTERVENTIONS:  - Support, maintain and protect limb and body alignment  - Provide patient/ family with appropriate education  6/11/2022 1059 by Glo Campos RN  Outcome: Adequate for Discharge  6/11/2022 1059 by Glo Campos RN  Outcome: Adequate for Discharge     Problem: MOBILITY - ADULT  Goal: Maintain or return to baseline ADL function  Description: INTERVENTIONS:  -  Assess patient's ability to carry out ADLs; assess patient's baseline for ADL function and identify physical deficits which impact ability to perform ADLs (bathing, care of mouth/teeth, toileting, grooming, dressing, etc )  - Assess/evaluate cause of self-care deficits   - Assess range of motion  - Assess patient's mobility; develop plan if impaired  - Assess patient's need for assistive devices and provide as appropriate  - Encourage maximum independence but intervene and supervise when necessary  - Involve family in performance of ADLs  - Assess for home care needs following discharge   - Consider OT consult to assist with ADL evaluation and planning for discharge  - Provide patient education as appropriate  6/11/2022 1059 by Glo Campos RN  Outcome: Adequate for Discharge  6/11/2022 1059 by Glo Campos RN  Outcome: Adequate for Discharge  Goal: Maintains/Returns to pre admission functional level  Description: INTERVENTIONS:  - Perform BMAT or MOVE assessment daily    - Set and communicate daily mobility goal to care team and patient/family/caregiver  - Collaborate with rehabilitation services on mobility goals if consulted  - Perform Range of Motion x times a day  - Reposition patient every x hours    - Dangle patient x times a day  - Stand patient x times a day  - Ambulate patient x times a day  - Out of bed to chair x times a day   - Out of bed for meals x times a day  - Out of bed for toileting  - Record patient progress and toleration of activity level   6/11/2022 1059 by Demond Segal RN  Outcome: Adequate for Discharge  6/11/2022 1059 by Demond Segal RN  Outcome: Adequate for Discharge     Problem: Prexisting or High Potential for Compromised Skin Integrity  Goal: Skin integrity is maintained or improved  Description: INTERVENTIONS:  - Identify patients at risk for skin breakdown  - Assess and monitor skin integrity  - Assess and monitor nutrition and hydration status  - Monitor labs   - Assess for incontinence   - Turn and reposition patient  - Assist with mobility/ambulation  - Relieve pressure over bony prominences  - Avoid friction and shearing  - Provide appropriate hygiene as needed including keeping skin clean and dry  - Evaluate need for skin moisturizer/barrier cream  - Collaborate with interdisciplinary team   - Patient/family teaching  - Consider wound care consult   6/11/2022 1059 by Demond Segal RN  Outcome: Adequate for Discharge  6/11/2022 1059 by Demond Segal RN  Outcome: Adequate for Discharge     Problem: HEMATOLOGIC - ADULT  Goal: Maintains hematologic stability  Description: INTERVENTIONS  - Assess for signs and symptoms of bleeding or hemorrhage  - Monitor labs  - Administer supportive blood products/factors as ordered and appropriate  6/11/2022 1059 by Olive Trammell Sherwin Hay RN  Outcome: Adequate for Discharge  2022 1059 by Madeline Cedillo RN  Outcome: Adequate for Discharge     Problem: Nutrition/Hydration-ADULT  Goal: Nutrient/Hydration intake appropriate for improving, restoring or maintaining nutritional needs  Description: Monitor and assess patient's nutrition/hydration status for malnutrition  Collaborate with interdisciplinary team and initiate plan and interventions as ordered  Monitor patient's weight and dietary intake as ordered or per policy  Utilize nutrition screening tool and intervene as necessary  Determine patient's food preferences and provide high-protein, high-caloric foods as appropriate  INTERVENTIONS:  - Monitor oral intake, urinary output, labs, and treatment plans  - Assess nutrition and hydration status and recommend course of action  - Evaluate amount of meals eaten  - Assist patient with eating if necessary   - Allow adequate time for meals  - Recommend/ encourage appropriate diets, oral nutritional supplements, and vitamin/mineral supplements  - Order, calculate, and assess calorie counts as needed  - Recommend, monitor, and adjust tube feedings and TPN/PPN based on assessed needs  - Assess need for intravenous fluids  - Provide specific nutrition/hydration education as appropriate  - Include patient/family/caregiver in decisions related to nutrition  2022 1059 by Madeline Cedillo RN  Outcome: Adequate for Discharge  2022 1059 by Madeline Cedillo RN  Outcome: Adequate for Discharge     Problem: Neurological Deficit  Goal: Neurological status is stable or improving  Description: Interventions:  - Monitor and assess patient's level of consciousness, motor function, sensory function, and level of assistance needed for ADLs  - Monitor and report changes from baseline  Collaborate with interdisciplinary team to initiate plan and implement interventions as ordered  - Provide and maintain a safe environment    - Consider seizure precautions  - Consider fall precautions  - Consider aspiration precautions  - Consider bleeding precautions  6/11/2022 1059 by Nilam Dominguez RN  Outcome: Adequate for Discharge  6/11/2022 1059 by Nilam Dominguez RN  Outcome: Adequate for Discharge     Problem: Activity Intolerance/Impaired Mobility  Goal: Mobility/activity is maintained at optimum level for patient  Description: Interventions:  - Assess and monitor patient  barriers to mobility and need for assistive/adaptive devices  - Assess patient's emotional response to limitations  - Collaborate with interdisciplinary team and initiate plans and interventions as ordered  - Encourage independent activity per ability   - Maintain proper body alignment  - Perform active/passive rom as tolerated/ordered  - Plan activities to conserve energy   - Turn patient as appropriate  6/11/2022 1059 by Nilam Dominguez RN  Outcome: Adequate for Discharge  6/11/2022 1059 by Nilam Dominguez RN  Outcome: Adequate for Discharge     Problem: Communication Impairment  Goal: Ability to express needs and understand communication  Description: Assess patient's communication skills and ability to understand information  Patient will demonstrate use of effective communication techniques, alternative methods of communication and understanding even if not able to speak  - Encourage communication and provide alternate methods of communication as needed  - Collaborate with case management/ for discharge needs  - Include patient/family/caregiver in decisions related to communication  6/11/2022 1059 by Nilam Dominguez RN  Outcome: Adequate for Discharge  6/11/2022 1059 by Nilam Dominguez RN  Outcome: Adequate for Discharge     Problem: Potential for Aspiration  Goal: Non-ventilated patient's risk of aspiration is minimized  Description: Assess and monitor vital signs, respiratory status, and labs (WBC)    Monitor for signs of aspiration (tachypnea, cough, rales, wheezing, cyanosis, fever)  - Assess and monitor patient's ability to swallow  - Place patient up in chair to eat if possible  - HOB up at 90 degrees to eat if unable to get patient up into chair   - Supervise patient during oral intake  - Instruct patient/ family to take small bites  - Instruct patient/ family to take small single sips when taking liquids  - Follow patient-specific strategies generated by speech pathologist   2022 1059 by Madeline Cedillo RN  Outcome: Adequate for Discharge  2022 1059 by Madeline Cedillo RN  Outcome: Adequate for Discharge     Problem: Nutrition  Goal: Nutrition/Hydration status is improving  Description: Monitor and assess patient's nutrition/hydration status for malnutrition (ex- brittle hair, bruises, dry skin, pale skin and conjunctiva, muscle wasting, smooth red tongue, and disorientation)  Collaborate with interdisciplinary team and initiate plan and interventions as ordered  Monitor patient's weight and dietary intake as ordered or per policy  Utilize nutrition screening tool and intervene per policy  Determine patient's food preferences and provide high-protein, high-caloric foods as appropriate  - Assist patient with eating   - Allow adequate time for meals   - Encourage patient to take dietary supplement as ordered  - Collaborate with clinical nutritionist   - Include patient/family/caregiver in decisions related to nutrition    2022 1059 by Madeline Cedillo RN  Outcome: Adequate for Discharge  2022 105 by Madeline Cedillo RN  Outcome: Adequate for Discharge

## 2022-06-11 NOTE — ASSESSMENT & PLAN NOTE
Patient diagnosed with CVA  On stroke pathway  Neurology consult appreciated  CT head and CTA-showed New right BRITTNEY distribution infarct in the paramedian right posterior frontal/parietal lobes  On telemetry  Hold Toprol-XL  P T /OT/ST  HB A1c and lipid panel reviewed  Hold aspirin and Plavix in setting of GI bleed  As per GI, if hemoglobin is stable can start on Plavix  Speech and swallow evaluation appreciated  MRI brain results as above  EEG results reviewed  Neurology follow-up  Patient was seen by speech specialist and they recommend starting on dysphagia pureed diet with nectar thick liquids  Continue on Plavix and Lipitor  Discussed with daughter at length and she feels that family would not want to go ahead with tube feeding  Palliative care consult appreciated  Patient 's daughter does not want any feeding tube    Daughter discussed with family, palliative care and  and opted for discharge to skilled nursing rehab with hospice care

## 2022-06-11 NOTE — ASSESSMENT & PLAN NOTE
Most likely diverticular bleed source-  GI input appreciated  Status post packed red cell transfusion  Hemoglobin is 8 6  On Protonix  Continue to trend

## 2022-06-11 NOTE — ASSESSMENT & PLAN NOTE
Gi consult appreciated  Status post packed red cell transfusion  Hemoglobin is 8 6  No active bleeding overnight  On Protonix  Hold off on endoscopy as per GI

## 2022-06-11 NOTE — NURSING NOTE
Report given to Shiva, Pt  Scheduled for transport back to [facility] called, reviewed Pt 's course of hospital admission, pt 's assessment and d/c instructions  Call-back # left ofr future questions

## 2022-06-11 NOTE — PLAN OF CARE
Problem: Potential for Falls  Goal: Patient will remain free of falls  Description: INTERVENTIONS:  - Educate patient/family on patient safety including physical limitations  - Instruct patient to call for assistance with activity   - Consult OT/PT to assist with strengthening/mobility   - Keep Call bell within reach  - Keep bed low and locked with side rails adjusted as appropriate  - Keep care items and personal belongings within reach  - Initiate and maintain comfort rounds  - Make Fall Risk Sign visible to staff  - Offer Toileting every x Hours, in advance of need  - Initiate/Maintain xalarm  - Obtain necessary fall risk management equipment: x  - Apply yellow socks and bracelet for high fall risk patients  - Consider moving patient to room near nurses station  Outcome: Adequate for Discharge     Problem: GASTROINTESTINAL - ADULT  Goal: Minimal or absence of nausea and/or vomiting  Description: INTERVENTIONS:  - Administer IV fluids if ordered to ensure adequate hydration  - Maintain NPO status until nausea and vomiting are resolved  - Nasogastric tube if ordered  - Administer ordered antiemetic medications as needed  - Provide nonpharmacologic comfort measures as appropriate  - Advance diet as tolerated, if ordered  - Consider nutrition services referral to assist patient with adequate nutrition and appropriate food choices  Outcome: Adequate for Discharge  Goal: Maintains or returns to baseline bowel function  Description: INTERVENTIONS:  - Assess bowel function  - Encourage oral fluids to ensure adequate hydration  - Administer IV fluids if ordered to ensure adequate hydration  - Administer ordered medications as needed  - Encourage mobilization and activity  - Consider nutritional services referral to assist patient with adequate nutrition and appropriate food choices  Outcome: Adequate for Discharge  Goal: Maintains adequate nutritional intake  Description: INTERVENTIONS:  - Monitor percentage of each meal consumed  - Identify factors contributing to decreased intake, treat as appropriate  - Assist with meals as needed  - Monitor I&O, weight, and lab values if indicated  - Obtain nutrition services referral as needed  Outcome: Adequate for Discharge  Goal: Establish and maintain optimal ostomy function  Description: INTERVENTIONS:  - Assess bowel function  - Encourage oral fluids to ensure adequate hydration  - Administer IV fluids if ordered to ensure adequate hydration   - Administer ordered medications as needed  - Encourage mobilization and activity  - Nutrition services referral to assist patient with appropriate food choices  - Assess stoma site  - Consider wound care consult   Outcome: Adequate for Discharge  Goal: Oral mucous membranes remain intact  Description: INTERVENTIONS  - Assess oral mucosa and hygiene practices  - Implement preventative oral hygiene regimen  - Implement oral medicated treatments as ordered  - Initiate Nutrition services referral as needed  Outcome: Adequate for Discharge     Problem: GENITOURINARY - ADULT  Goal: Maintains or returns to baseline urinary function  Description: INTERVENTIONS:  - Assess urinary function  - Encourage oral fluids to ensure adequate hydration if ordered  - Administer IV fluids as ordered to ensure adequate hydration  - Administer ordered medications as needed  - Offer frequent toileting  - Follow urinary retention protocol if ordered  Outcome: Adequate for Discharge  Goal: Absence of urinary retention  Description: INTERVENTIONS:  - Assess patients ability to void and empty bladder  - Monitor I/O  - Bladder scan as needed  - Discuss with physician/AP medications to alleviate retention as needed  - Discuss catheterization for long term situations as appropriate  Outcome: Adequate for Discharge  Goal: Urinary catheter remains patent  Description: INTERVENTIONS:  - Assess patency of urinary catheter  - If patient has a chronic edwards, consider changing catheter if non-functioning  - Follow guidelines for intermittent irrigation of non-functioning urinary catheter  Outcome: Adequate for Discharge     Problem: METABOLIC, FLUID AND ELECTROLYTES - ADULT  Goal: Electrolytes maintained within normal limits  Description: INTERVENTIONS:  - Monitor labs and assess patient for signs and symptoms of electrolyte imbalances  - Administer electrolyte replacement as ordered  - Monitor response to electrolyte replacements, including repeat lab results as appropriate  - Instruct patient on fluid and nutrition as appropriate  Outcome: Adequate for Discharge  Goal: Fluid balance maintained  Description: INTERVENTIONS:  - Monitor labs   - Monitor I/O and WT  - Instruct patient on fluid and nutrition as appropriate  - Assess for signs & symptoms of volume excess or deficit  Outcome: Adequate for Discharge  Goal: Glucose maintained within target range  Description: INTERVENTIONS:  - Monitor Blood Glucose as ordered  - Assess for signs and symptoms of hyperglycemia and hypoglycemia  - Administer ordered medications to maintain glucose within target range  - Assess nutritional intake and initiate nutrition service referral as needed  Outcome: Adequate for Discharge     Problem: SKIN/TISSUE INTEGRITY - ADULT  Goal: Skin Integrity remains intact(Skin Breakdown Prevention)  Description: Assess:  -Perform Miguel assessment every x  -Clean and moisturize skin every x  -Inspect skin when repositioning, toileting, and assisting with ADLS  -Assess under medical devices such as x every x  -Assess extremities for adequate circulation and sensation     Bed Management:  -Have minimal linens on bed & keep smooth, unwrinkled  -Change linens as needed when moist or perspiring  -Avoid sitting or lying in one position for more than x hours while in bed  -Keep HOB at [facility]     Toileting:  -Offer bedside commode  -Assess for incontinence every x  -Use incontinent care products after each incontinent episode such as x    Activity:  -Mobilize patient x times a day  -Encourage activity and walks on unit  -Encourage or provide ROM exercises   -Turn and reposition patient every x Hours  -Use appropriate equipment to lift or move patient in bed  -Instruct/ Assist with weight shifting every x when out of bed in chair  -Consider limitation of chair time x hour intervals    Skin Care:  -Avoid use of baby powder, tape, friction and shearing, hot water or constrictive clothing  -Relieve pressure over bony prominences using x  -Do not massage red bony areas    Next Steps:  -Teach patient strategies to minimize risks such as x   -Consider consults to  interdisciplinary teams such as x  Outcome: Adequate for Discharge  Goal: Incision(s), wounds(s) or drain site(s) healing without S/S of infection  Description: INTERVENTIONS  - Assess and document dressing, incision, wound bed, drain sites and surrounding tissue  - Provide patient and family education  - Perform skin care/dressing changes every x  Outcome: Adequate for Discharge  Goal: Pressure injury heals and does not worsen  Description: Interventions:  - Implement low air loss mattress or specialty surface (Criteria met)  - Apply silicone foam dressing  - Instruct/assist with weight shifting every x minutes when in chair   - Limit chair time to x hour intervals  - Use special pressure reducing interventions such as x when in chair   - Apply fecal or urinary incontinence containment device   - Perform passive or active ROM every x  - Turn and reposition patient & offload bony prominences every x hours   - Utilize friction reducing device or surface for transfers   - Consider consults to  interdisciplinary teams such as x  - Use incontinent care products after each incontinent episode such as x  - Consider nutrition services referral as needed  Outcome: Adequate for Discharge     Problem: MUSCULOSKELETAL - ADULT  Goal: Maintain or return mobility to safest level of function  Description: INTERVENTIONS:  - Assess patient's ability to carry out ADLs; assess patient's baseline for ADL function and identify physical deficits which impact ability to perform ADLs (bathing, care of mouth/teeth, toileting, grooming, dressing, etc )  - Assess/evaluate cause of self-care deficits   - Assess range of motion  - Assess patient's mobility  - Assess patient's need for assistive devices and provide as appropriate  - Encourage maximum independence but intervene and supervise when necessary  - Involve family in performance of ADLs  - Assess for home care needs following discharge   - Consider OT consult to assist with ADL evaluation and planning for discharge  - Provide patient education as appropriate  Outcome: Adequate for Discharge  Goal: Maintain proper alignment of affected body part  Description: INTERVENTIONS:  - Support, maintain and protect limb and body alignment  - Provide patient/ family with appropriate education  Outcome: Adequate for Discharge     Problem: MOBILITY - ADULT  Goal: Maintain or return to baseline ADL function  Description: INTERVENTIONS:  -  Assess patient's ability to carry out ADLs; assess patient's baseline for ADL function and identify physical deficits which impact ability to perform ADLs (bathing, care of mouth/teeth, toileting, grooming, dressing, etc )  - Assess/evaluate cause of self-care deficits   - Assess range of motion  - Assess patient's mobility; develop plan if impaired  - Assess patient's need for assistive devices and provide as appropriate  - Encourage maximum independence but intervene and supervise when necessary  - Involve family in performance of ADLs  - Assess for home care needs following discharge   - Consider OT consult to assist with ADL evaluation and planning for discharge  - Provide patient education as appropriate  Outcome: Adequate for Discharge  Goal: Maintains/Returns to pre admission functional level  Description: INTERVENTIONS:  - Perform BMAT or MOVE assessment daily    - Set and communicate daily mobility goal to care team and patient/family/caregiver  - Collaborate with rehabilitation services on mobility goals if consulted  - Perform Range of Motion x times a day  - Reposition patient every x hours  - Dangle patient x times a day  - Stand patient x times a day  - Ambulate patient x times a day  - Out of bed to chair x times a day   - Out of bed for meals x times a day  - Out of bed for toileting  - Record patient progress and toleration of activity level   Outcome: Adequate for Discharge     Problem: Prexisting or High Potential for Compromised Skin Integrity  Goal: Skin integrity is maintained or improved  Description: INTERVENTIONS:  - Identify patients at risk for skin breakdown  - Assess and monitor skin integrity  - Assess and monitor nutrition and hydration status  - Monitor labs   - Assess for incontinence   - Turn and reposition patient  - Assist with mobility/ambulation  - Relieve pressure over bony prominences  - Avoid friction and shearing  - Provide appropriate hygiene as needed including keeping skin clean and dry  - Evaluate need for skin moisturizer/barrier cream  - Collaborate with interdisciplinary team   - Patient/family teaching  - Consider wound care consult   Outcome: Adequate for Discharge     Problem: HEMATOLOGIC - ADULT  Goal: Maintains hematologic stability  Description: INTERVENTIONS  - Assess for signs and symptoms of bleeding or hemorrhage  - Monitor labs  - Administer supportive blood products/factors as ordered and appropriate  Outcome: Adequate for Discharge     Problem: Nutrition/Hydration-ADULT  Goal: Nutrient/Hydration intake appropriate for improving, restoring or maintaining nutritional needs  Description: Monitor and assess patient's nutrition/hydration status for malnutrition  Collaborate with interdisciplinary team and initiate plan and interventions as ordered    Monitor patient's weight and dietary intake as ordered or per policy  Utilize nutrition screening tool and intervene as necessary  Determine patient's food preferences and provide high-protein, high-caloric foods as appropriate  INTERVENTIONS:  - Monitor oral intake, urinary output, labs, and treatment plans  - Assess nutrition and hydration status and recommend course of action  - Evaluate amount of meals eaten  - Assist patient with eating if necessary   - Allow adequate time for meals  - Recommend/ encourage appropriate diets, oral nutritional supplements, and vitamin/mineral supplements  - Order, calculate, and assess calorie counts as needed  - Recommend, monitor, and adjust tube feedings and TPN/PPN based on assessed needs  - Assess need for intravenous fluids  - Provide specific nutrition/hydration education as appropriate  - Include patient/family/caregiver in decisions related to nutrition  Outcome: Adequate for Discharge     Problem: Neurological Deficit  Goal: Neurological status is stable or improving  Description: Interventions:  - Monitor and assess patient's level of consciousness, motor function, sensory function, and level of assistance needed for ADLs  - Monitor and report changes from baseline  Collaborate with interdisciplinary team to initiate plan and implement interventions as ordered  - Provide and maintain a safe environment  - Consider seizure precautions  - Consider fall precautions  - Consider aspiration precautions  - Consider bleeding precautions  Outcome: Adequate for Discharge     Problem: Activity Intolerance/Impaired Mobility  Goal: Mobility/activity is maintained at optimum level for patient  Description: Interventions:  - Assess and monitor patient  barriers to mobility and need for assistive/adaptive devices  - Assess patient's emotional response to limitations  - Collaborate with interdisciplinary team and initiate plans and interventions as ordered    - Encourage independent activity per ability   - Maintain proper body alignment  - Perform active/passive rom as tolerated/ordered  - Plan activities to conserve energy   - Turn patient as appropriate  Outcome: Adequate for Discharge     Problem: Communication Impairment  Goal: Ability to express needs and understand communication  Description: Assess patient's communication skills and ability to understand information  Patient will demonstrate use of effective communication techniques, alternative methods of communication and understanding even if not able to speak  - Encourage communication and provide alternate methods of communication as needed  - Collaborate with case management/ for discharge needs  - Include patient/family/caregiver in decisions related to communication  Outcome: Adequate for Discharge     Problem: Potential for Aspiration  Goal: Non-ventilated patient's risk of aspiration is minimized  Description: Assess and monitor vital signs, respiratory status, and labs (WBC)  Monitor for signs of aspiration (tachypnea, cough, rales, wheezing, cyanosis, fever)  - Assess and monitor patient's ability to swallow  - Place patient up in chair to eat if possible  - HOB up at 90 degrees to eat if unable to get patient up into chair   - Supervise patient during oral intake  - Instruct patient/ family to take small bites  - Instruct patient/ family to take small single sips when taking liquids  - Follow patient-specific strategies generated by speech pathologist   Outcome: Adequate for Discharge     Problem: Nutrition  Goal: Nutrition/Hydration status is improving  Description: Monitor and assess patient's nutrition/hydration status for malnutrition (ex- brittle hair, bruises, dry skin, pale skin and conjunctiva, muscle wasting, smooth red tongue, and disorientation)  Collaborate with interdisciplinary team and initiate plan and interventions as ordered    Monitor patient's weight and dietary intake as ordered or per policy  Utilize nutrition screening tool and intervene per policy  Determine patient's food preferences and provide high-protein, high-caloric foods as appropriate  - Assist patient with eating   - Allow adequate time for meals   - Encourage patient to take dietary supplement as ordered  - Collaborate with clinical nutritionist   - Include patient/family/caregiver in decisions related to nutrition    Outcome: Adequate for Discharge

## 2022-06-11 NOTE — ASSESSMENT & PLAN NOTE
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

## 2022-06-11 NOTE — PLAN OF CARE
Problem: Potential for Falls  Goal: Patient will remain free of falls  Description: INTERVENTIONS:  - Educate patient/family on patient safety including physical limitations  - Instruct patient to call for assistance with activity   - Consult OT/PT to assist with strengthening/mobility   - Keep Call bell within reach  - Keep bed low and locked with side rails adjusted as appropriate  - Keep care items and personal belongings within reach  - Initiate and maintain comfort rounds  - Make Fall Risk Sign visible to staff  - Offer Toileting every 2 Hours, in advance of need  - Initiate/Maintain bed alarm  - Obtain necessary fall risk management equipment:   - Apply yellow socks and bracelet for high fall risk patients  - Consider moving patient to room near nurses station  Outcome: Progressing     Problem: GASTROINTESTINAL - ADULT  Goal: Minimal or absence of nausea and/or vomiting  Description: INTERVENTIONS:  - Administer IV fluids if ordered to ensure adequate hydration  - Maintain NPO status until nausea and vomiting are resolved  - Nasogastric tube if ordered  - Administer ordered antiemetic medications as needed  - Provide nonpharmacologic comfort measures as appropriate  - Advance diet as tolerated, if ordered  - Consider nutrition services referral to assist patient with adequate nutrition and appropriate food choices  Outcome: Progressing  Goal: Maintains or returns to baseline bowel function  Description: INTERVENTIONS:  - Assess bowel function  - Encourage oral fluids to ensure adequate hydration  - Administer IV fluids if ordered to ensure adequate hydration  - Administer ordered medications as needed  - Encourage mobilization and activity  - Consider nutritional services referral to assist patient with adequate nutrition and appropriate food choices  Outcome: Progressing  Goal: Maintains adequate nutritional intake  Description: INTERVENTIONS:  - Monitor percentage of each meal consumed  - Identify factors contributing to decreased intake, treat as appropriate  - Assist with meals as needed  - Monitor I&O, weight, and lab values if indicated  - Obtain nutrition services referral as needed  Outcome: Progressing  Goal: Establish and maintain optimal ostomy function  Description: INTERVENTIONS:  - Assess bowel function  - Encourage oral fluids to ensure adequate hydration  - Administer IV fluids if ordered to ensure adequate hydration   - Administer ordered medications as needed  - Encourage mobilization and activity  - Nutrition services referral to assist patient with appropriate food choices  - Assess stoma site  - Consider wound care consult   Outcome: Progressing  Goal: Oral mucous membranes remain intact  Description: INTERVENTIONS  - Assess oral mucosa and hygiene practices  - Implement preventative oral hygiene regimen  - Implement oral medicated treatments as ordered  - Initiate Nutrition services referral as needed  Outcome: Progressing     Problem: GENITOURINARY - ADULT  Goal: Maintains or returns to baseline urinary function  Description: INTERVENTIONS:  - Assess urinary function  - Encourage oral fluids to ensure adequate hydration if ordered  - Administer IV fluids as ordered to ensure adequate hydration  - Administer ordered medications as needed  - Offer frequent toileting  - Follow urinary retention protocol if ordered  Outcome: Progressing  Goal: Absence of urinary retention  Description: INTERVENTIONS:  - Assess patients ability to void and empty bladder  - Monitor I/O  - Bladder scan as needed  - Discuss with physician/AP medications to alleviate retention as needed  - Discuss catheterization for long term situations as appropriate  Outcome: Progressing  Goal: Urinary catheter remains patent  Description: INTERVENTIONS:  - Assess patency of urinary catheter  - If patient has a chronic edwards, consider changing catheter if non-functioning  - Follow guidelines for intermittent irrigation of non-functioning urinary catheter  Outcome: Progressing     Problem: METABOLIC, FLUID AND ELECTROLYTES - ADULT  Goal: Electrolytes maintained within normal limits  Description: INTERVENTIONS:  - Monitor labs and assess patient for signs and symptoms of electrolyte imbalances  - Administer electrolyte replacement as ordered  - Monitor response to electrolyte replacements, including repeat lab results as appropriate  - Instruct patient on fluid and nutrition as appropriate  Outcome: Progressing  Goal: Fluid balance maintained  Description: INTERVENTIONS:  - Monitor labs   - Monitor I/O and WT  - Instruct patient on fluid and nutrition as appropriate  - Assess for signs & symptoms of volume excess or deficit  Outcome: Progressing  Goal: Glucose maintained within target range  Description: INTERVENTIONS:  - Monitor Blood Glucose as ordered  - Assess for signs and symptoms of hyperglycemia and hypoglycemia  - Administer ordered medications to maintain glucose within target range  - Assess nutritional intake and initiate nutrition service referral as needed  Outcome: Progressing     Problem: SKIN/TISSUE INTEGRITY - ADULT  Goal: Skin Integrity remains intact(Skin Breakdown Prevention)  Description: Assess:  -Perform Miguel assessment  -Clean and moisturize skin  -Inspect skin when repositioning, toileting, and assisting with ADLS  -Assess under medical devices   -Assess extremities for adequate circulation and sensation     Bed Management:  -Have minimal linens on bed & keep smooth, unwrinkled  -Change linens as needed when moist or perspiring  -Avoid sitting or lying in one position for more than 2 hours while in bed  -Keep HOB at 30 degrees     Toileting:  -Offer bedside commode  -Assess for incontinence   -Use incontinent care products after each incontinent episode    Activity:  -Mobilize patient 2 times a day  -Encourage activity and walks on unit  -Encourage or provide ROM exercises   -Turn and reposition patient every 2 Hours  -Use appropriate equipment to lift or move patient in bed  -Instruct/ Assist with weight shifting every 2 when out of bed in chair  -Consider limitation of chair time 2 hour intervals    Skin Care:  -Avoid use of baby powder, tape, friction and shearing, hot water or constrictive clothing  -Relieve pressure over bony prominences  -Do not massage red bony areas    Next Steps:  -Teach patient strategies to minimize risks   -Consider consults to  interdisciplinary teams   Outcome: Progressing  Goal: Incision(s), wounds(s) or drain site(s) healing without S/S of infection  Description: INTERVENTIONS  - Assess and document dressing, incision, wound bed, drain sites and surrounding tissue  - Provide patient and family education  - Perform skin care/dressing changes  Outcome: Progressing  Goal: Pressure injury heals and does not worsen  Description: Interventions:  - Implement low air loss mattress or specialty surface (Criteria met)  - Apply silicone foam dressing  - Instruct/assist with weight shifting every  minutes when in chair   - Limit chair time to 2 hour intervals  - Use special pressure reducing interventions such as xx when in chair   - Apply fecal or urinary incontinence containment device   - Perform passive or active ROM  - Turn and reposition patient & offload bony prominences every 2 hours   - Utilize friction reducing device or surface for transfers   - Consider consults to  interdisciplinary teams  - Use incontinent care products after each incontinent episode  - Consider nutrition services referral as needed  Outcome: Progressing     Problem: MUSCULOSKELETAL - ADULT  Goal: Maintain or return mobility to safest level of function  Description: INTERVENTIONS:  - Assess patient's ability to carry out ADLs; assess patient's baseline for ADL function and identify physical deficits which impact ability to perform ADLs (bathing, care of mouth/teeth, toileting, grooming, dressing, etc )  - Assess/evaluate cause of self-care deficits   - Assess range of motion  - Assess patient's mobility  - Assess patient's need for assistive devices and provide as appropriate  - Encourage maximum independence but intervene and supervise when necessary  - Involve family in performance of ADLs  - Assess for home care needs following discharge   - Consider OT consult to assist with ADL evaluation and planning for discharge  - Provide patient education as appropriate  Outcome: Progressing  Goal: Maintain proper alignment of affected body part  Description: INTERVENTIONS:  - Support, maintain and protect limb and body alignment  - Provide patient/ family with appropriate education  Outcome: Progressing     Problem: MOBILITY - ADULT  Goal: Maintain or return to baseline ADL function  Description: INTERVENTIONS:  -  Assess patient's ability to carry out ADLs; assess patient's baseline for ADL function and identify physical deficits which impact ability to perform ADLs (bathing, care of mouth/teeth, toileting, grooming, dressing, etc )  - Assess/evaluate cause of self-care deficits   - Assess range of motion  - Assess patient's mobility; develop plan if impaired  - Assess patient's need for assistive devices and provide as appropriate  - Encourage maximum independence but intervene and supervise when necessary  - Involve family in performance of ADLs  - Assess for home care needs following discharge   - Consider OT consult to assist with ADL evaluation and planning for discharge  - Provide patient education as appropriate  Outcome: Progressing  Goal: Maintains/Returns to pre admission functional level  Description: INTERVENTIONS:  - Perform BMAT or MOVE assessment daily    - Set and communicate daily mobility goal to care team and patient/family/caregiver  - Collaborate with rehabilitation services on mobility goals if consulted  - Perform Range of Motion 2 times a day  - Reposition patient every 2 hours    - Dangle patient 2 times a day  - Stand patient 2 times a day  - Ambulate patient 2 times a day  - Out of bed to chair 2 times a day   - Out of bed for meals 2 times a day  - Out of bed for toileting  - Record patient progress and toleration of activity level   Outcome: Progressing     Problem: Prexisting or High Potential for Compromised Skin Integrity  Goal: Skin integrity is maintained or improved  Description: INTERVENTIONS:  - Identify patients at risk for skin breakdown  - Assess and monitor skin integrity  - Assess and monitor nutrition and hydration status  - Monitor labs   - Assess for incontinence   - Turn and reposition patient  - Assist with mobility/ambulation  - Relieve pressure over bony prominences  - Avoid friction and shearing  - Provide appropriate hygiene as needed including keeping skin clean and dry  - Evaluate need for skin moisturizer/barrier cream  - Collaborate with interdisciplinary team   - Patient/family teaching  - Consider wound care consult   Outcome: Progressing     Problem: HEMATOLOGIC - ADULT  Goal: Maintains hematologic stability  Description: INTERVENTIONS  - Assess for signs and symptoms of bleeding or hemorrhage  - Monitor labs  - Administer supportive blood products/factors as ordered and appropriate  Outcome: Progressing     Problem: Nutrition/Hydration-ADULT  Goal: Nutrient/Hydration intake appropriate for improving, restoring or maintaining nutritional needs  Description: Monitor and assess patient's nutrition/hydration status for malnutrition  Collaborate with interdisciplinary team and initiate plan and interventions as ordered  Monitor patient's weight and dietary intake as ordered or per policy  Utilize nutrition screening tool and intervene as necessary  Determine patient's food preferences and provide high-protein, high-caloric foods as appropriate       INTERVENTIONS:  - Monitor oral intake, urinary output, labs, and treatment plans  - Assess nutrition and hydration status and recommend course of action  - Evaluate amount of meals eaten  - Assist patient with eating if necessary   - Allow adequate time for meals  - Recommend/ encourage appropriate diets, oral nutritional supplements, and vitamin/mineral supplements  - Order, calculate, and assess calorie counts as needed  - Recommend, monitor, and adjust tube feedings and TPN/PPN based on assessed needs  - Assess need for intravenous fluids  - Provide specific nutrition/hydration education as appropriate  - Include patient/family/caregiver in decisions related to nutrition  Outcome: Progressing     Problem: Neurological Deficit  Goal: Neurological status is stable or improving  Description: Interventions:  - Monitor and assess patient's level of consciousness, motor function, sensory function, and level of assistance needed for ADLs  - Monitor and report changes from baseline  Collaborate with interdisciplinary team to initiate plan and implement interventions as ordered  - Provide and maintain a safe environment  - Consider seizure precautions  - Consider fall precautions  - Consider aspiration precautions  - Consider bleeding precautions  Outcome: Progressing     Problem: Activity Intolerance/Impaired Mobility  Goal: Mobility/activity is maintained at optimum level for patient  Description: Interventions:  - Assess and monitor patient  barriers to mobility and need for assistive/adaptive devices  - Assess patient's emotional response to limitations  - Collaborate with interdisciplinary team and initiate plans and interventions as ordered  - Encourage independent activity per ability   - Maintain proper body alignment  - Perform active/passive rom as tolerated/ordered    - Plan activities to conserve energy   - Turn patient as appropriate  Outcome: Progressing     Problem: Communication Impairment  Goal: Ability to express needs and understand communication  Description: Assess patient's communication skills and ability to understand information  Patient will demonstrate use of effective communication techniques, alternative methods of communication and understanding even if not able to speak  - Encourage communication and provide alternate methods of communication as needed  - Collaborate with case management/ for discharge needs  - Include patient/family/caregiver in decisions related to communication  Outcome: Progressing     Problem: Potential for Aspiration  Goal: Non-ventilated patient's risk of aspiration is minimized  Description: Assess and monitor vital signs, respiratory status, and labs (WBC)  Monitor for signs of aspiration (tachypnea, cough, rales, wheezing, cyanosis, fever)  - Assess and monitor patient's ability to swallow  - Place patient up in chair to eat if possible  - HOB up at 90 degrees to eat if unable to get patient up into chair   - Supervise patient during oral intake  - Instruct patient/ family to take small bites  - Instruct patient/ family to take small single sips when taking liquids  - Follow patient-specific strategies generated by speech pathologist   Outcome: Progressing     Problem: Nutrition  Goal: Nutrition/Hydration status is improving  Description: Monitor and assess patient's nutrition/hydration status for malnutrition (ex- brittle hair, bruises, dry skin, pale skin and conjunctiva, muscle wasting, smooth red tongue, and disorientation)  Collaborate with interdisciplinary team and initiate plan and interventions as ordered  Monitor patient's weight and dietary intake as ordered or per policy  Utilize nutrition screening tool and intervene per policy  Determine patient's food preferences and provide high-protein, high-caloric foods as appropriate  - Assist patient with eating   - Allow adequate time for meals   - Encourage patient to take dietary supplement as ordered    - Collaborate with clinical nutritionist   - Include patient/family/caregiver in decisions related to nutrition    Outcome: Progressing